# Patient Record
Sex: FEMALE | Race: WHITE | Employment: OTHER | RURAL
[De-identification: names, ages, dates, MRNs, and addresses within clinical notes are randomized per-mention and may not be internally consistent; named-entity substitution may affect disease eponyms.]

---

## 2017-01-17 ENCOUNTER — OFFICE VISIT (OUTPATIENT)
Dept: FAMILY MEDICINE CLINIC | Age: 82
End: 2017-01-17

## 2017-01-17 VITALS
HEART RATE: 63 BPM | HEIGHT: 62 IN | BODY MASS INDEX: 30.51 KG/M2 | TEMPERATURE: 98.5 F | OXYGEN SATURATION: 95 % | SYSTOLIC BLOOD PRESSURE: 121 MMHG | WEIGHT: 165.8 LBS | RESPIRATION RATE: 20 BRPM | DIASTOLIC BLOOD PRESSURE: 70 MMHG

## 2017-01-17 DIAGNOSIS — Z71.89 ADVANCE CARE PLANNING: ICD-10-CM

## 2017-01-17 DIAGNOSIS — M17.0 OSTEOARTHRITIS OF BOTH KNEES, UNSPECIFIED OSTEOARTHRITIS TYPE: ICD-10-CM

## 2017-01-17 DIAGNOSIS — I73.9 PAD (PERIPHERAL ARTERY DISEASE) (HCC): ICD-10-CM

## 2017-01-17 DIAGNOSIS — Z00.00 MEDICARE ANNUAL WELLNESS VISIT, SUBSEQUENT: Primary | ICD-10-CM

## 2017-01-17 RX ORDER — CILOSTAZOL 100 MG/1
TABLET ORAL
Qty: 180 TAB | Refills: 3 | Status: SHIPPED | OUTPATIENT
Start: 2017-01-17 | End: 2019-01-01 | Stop reason: ALTCHOICE

## 2017-01-17 RX ORDER — HYDROCODONE BITARTRATE AND ACETAMINOPHEN 5; 325 MG/1; MG/1
1 TABLET ORAL
Qty: 90 TAB | Refills: 0 | Status: SHIPPED | OUTPATIENT
Start: 2017-01-17 | End: 2017-02-13 | Stop reason: SDUPTHER

## 2017-01-17 NOTE — MR AVS SNAPSHOT
Visit Information Date & Time Provider Department Dept. Phone Encounter #  
 1/17/2017  8:40 AM Sharyle Newcomer, MD [address] [place] [address] [phone] [id number] Follow-up Instructions Return in about 3 months (around 4/17/2017). Follow-up and Disposition History Upcoming Health Maintenance Date Due DTaP/Tdap/Td series (1 - Tdap) 11/24/2015 MEDICARE YEARLY EXAM 12/10/2016 GLAUCOMA SCREENING Q2Y 1/17/2019 Allergies as of 1/17/2017  Review Complete On: 1/17/2017 By: Carissa Pittman LPN Severity Noted Reaction Type Reactions Amoxicillin  08/20/2013    Unknown (comments) Biaxin [Clarithromycin]  01/15/2014    Unknown (comments) Celebrex [Celecoxib]  09/24/2014    Other (comments) Blood in urine Indocin [Indomethacin]  01/15/2014    Unknown (comments) Zithromax [Azithromycin]  01/15/2014    Unknown (comments) Current Immunizations  Reviewed on 10/11/2016 Name Date Influenza High Dose Vaccine PF 10/11/2016, 10/8/2015 Influenza Vaccine 9/24/2014, 10/23/2013, 9/19/2012 Pneumococcal Conjugate (PCV-13) 10/11/2016 Pneumococcal Vaccine (Unspecified Type) 1/23/2009 Td 11/23/2015, 12/14/2008 Not reviewed this visit You Were Diagnosed With   
  
 Codes Comments Medicare annual wellness visit, subsequent    -  Primary ICD-10-CM: Z00.00 ICD-9-CM: V70.0 Advance care planning     ICD-10-CM: Z71.89 ICD-9-CM: V65.49 Osteoarthritis of both knees, unspecified osteoarthritis type     ICD-10-CM: M17.0 ICD-9-CM: 715.96   
 PAD (peripheral artery disease) ([facility] [address]     ICD-10-CM: I73.9 ICD-9-CM: 443. 9 Vitals BP Pulse Temp Resp Height(growth percentile) 121/70 (BP 1 Location: Right arm, BP Patient Position: Sitting) 63 98.5 °F (36.9 °C) (Temporal) 20 5' 2\" (1.575 m) Weight(growth percentile) SpO2 BMI OB Status Smoking Status 165 lb 12.8 oz (75.2 kg) 95% 30.33 kg/m2 Menopause Never Smoker Vitals History BMI and BSA Data Body Mass Index Body Surface Area  
 30.33 kg/m 2 1.81 m 2 Preferred Pharmacy Pharmacy Name Phone [facility] [address] [facility], [address] [place] [address] Your Updated Medication List  
  
   
This list is accurate as of: 1/17/17  9:34 AM.  Always use your most recent med list.  
  
  
  
  
 albuterol 90 mcg/actuation inhaler Commonly known as:  VENTOLIN HFA SHAKE WELL. INHALE 2 PUFFS (1 MINUTE APART) EVERY 4 HOURS AS NEEDED FOR COUGH OR WHEEZE  
  
 ascorbic acid (vitamin C) 500 mg tablet Commonly known as:  VITAMIN C Take  by mouth. ASPERCREME HEAT 10 % Gel Generic drug:  Menthol  
by Apply Externally route. Calcium Carbonate-Vit D3-Min 600 mg calcium- 400 unit Tab Take  by mouth two (2) times a day. cilostazol 100 mg tablet Commonly known as:  PLETAL One bid  
  
 citalopram 40 mg tablet Commonly known as:  CELEXA  
TAKE 1 TABLET BY MOUTH AT BEDTIME FOR DEPRESSION  
  
 gabapentin 100 mg capsule Commonly known as:  NEURONTIN  
TAKE ONE CAPSULE BY MOUTH AT BEDTIME FOR NEUROPATHIC PAIN  
  
 gemfibrozil 600 mg tablet Commonly known as:  LOPID TAKE 1 TABLET BY MOUTH TWICE DAILY FOR CHOLESTROL  
  
 hydroCHLOROthiazide 25 mg tablet Commonly known as:  HYDRODIURIL  
TAKE 1 TABLET BY MOUTH EVERY DAY  
  
 HYDROcodone-acetaminophen 5-325 mg per tablet Commonly known as:  Iven Reddy Take 1 Tab by mouth every eight (8) hours as needed. Max Daily Amount: 3 Tabs. KRILL -51-28-50 mg Cap Generic drug:  krill-omega-3-dha-epa-lipids Take  by mouth. LACTAID PO Take  by mouth.  
  
 lovastatin 20 mg tablet Commonly known as:  MEVACOR  
TAKE 1 TABLET BY MOUTH AT BEDTIME  
  
 MUCUS RELIEF 400 mg tablet Generic drug:  guaiFENesin Take  by mouth two (2) times a day. omeprazole 20 mg capsule Commonly known as:  PRILOSEC  
TAKE ONE CAPSULE BY MOUTH DAILY FOR STOMACH  
  
 simethicone 125 mg capsule Commonly known as:  GAS-X Take 125 mg by mouth four (4) times daily as needed for Flatulence. valsartan 320 mg tablet Commonly known as:  DIOVAN  
TAKE 1 TABLET BY MOUTH EVERY DAY FOR BLOOD PRESSURE Prescriptions Printed Refills HYDROcodone-acetaminophen (NORCO) 5-325 mg per tablet 0 Sig: Take 1 Tab by mouth every eight (8) hours as needed. Max Daily Amount: 3 Tabs. Class: Print Route: Oral  
  
Prescriptions Sent to Pharmacy Refills  
 cilostazol (PLETAL) 100 mg tablet 3 Sig: One bid Class: Normal  
 Pharmacy: [facility] [address] [place] [address] [address]  #: [phone] Follow-up Instructions Return in about 3 months (around 4/17/2017). Patient Instructions Schedule of Personalized Health Plan (Provide Copy to Patient) The best way to stay healthy is to live a healthy lifestyle. A healthy lifestyle includes regular exercise, eating a well-balanced diet, keeping a healthy weight and not smoking. Regular physical exams and screening tests are another important way to take care of yourself. Preventive exams provided by health care providers can find health problems early when treatment works best and can keep you from getting certain diseases or illnesses. Preventive services include exams, lab tests, screenings, shots, monitoring and information to help you take care of your own health. All people over 65 should have a pneumonia shot. Pneumonia shots are usually only needed once in a lifetime unless your doctor decides differently. All people over 65 should have a yearly flu shot. People over 65 are at medium to high risk for Hepatitis B. Three shots are needed for complete protection. In addition to your physical exam, some screening tests are recommended: 
 
Bone mass measurement (dexa scan) is recommended every two years Diabetes Mellitus screening is recommended every year. Glaucoma is an eye disease caused by high pressure in the eye. An eye exam is recommended every year. Cardiovascular screening tests that check your cholesterol and other blood fat (lipid) levels are recommended every five years. Colorectal Cancer screening tests help to find pre-cancerous polyps (growths in the colon) so they can be removed before they turn into cancer. Tests ordered for screening depend on your personal and family history risk factors. Screening for Breast Cancer is recommended yearly with a mammogram. 
 
Screening for Cervical Cancer is recommended every two years (annually for certain risk factors, such as previous history of STD or abnormal PAP in past 7 years), with a Pelvic Exam with PAP Here is a list of your current Health Maintenance items with a due date: 
Health Maintenance Topic Date Due  
 ZOSTER VACCINE AGE 60>  02/28/1985  GLAUCOMA SCREENING Q2Y  02/28/1990  
 DTaP/Tdap/Td series (1 - Tdap) 11/24/2015  MEDICARE YEARLY EXAM  12/10/2016  
 OSTEOPOROSIS SCREENING (DEXA)  Completed  Pneumococcal 65+ Low/Medium Risk  Completed  INFLUENZA AGE 9 TO ADULT  Completed Introducing [facility]! Svitlana Villanueva introduces Zomato patient portal. Now you can access parts of your medical record, email your doctor's office, and request medication refills online. 1. In your internet browser, go to [email or web address] 2. Click on the First Time User? Click Here link in the Sign In box. You will see the New Member Sign Up page. 3. Enter your Zomato Access Code exactly as it appears below. You will not need to use this code after youve completed the sign-up process. If you do not sign up before the expiration date, you must request a new code. · Zomato Access Code: [email or web address] Expires: 4/17/2017  8:56 AM 
 
4.  Enter the last four digits of your Social Security Number (xxxx) and Date of Birth (mm/dd/yyyy) as indicated and click Submit. You will be taken to the next sign-up page. 5. Create a Blogic ID. This will be your Blogic login ID and cannot be changed, so think of one that is secure and easy to remember. 6. Create a Blogic password. You can change your password at any time. 7. Enter your Password Reset Question and Answer. This can be used at a later time if you forget your password. 8. Enter your e-mail address. You will receive e-mail notification when new information is available in [address] 9. Click Sign Up. You can now view and download portions of your medical record. 10. Click the Download Summary menu link to download a portable copy of your medical information. If you have questions, please visit the Frequently Asked Questions section of the Blogic website. Remember, Blogic is NOT to be used for urgent needs. For medical emergencies, dial 911. Now available from your iPhone and Android! Please provide this summary of care documentation to your next provider. Your primary care clinician is listed as Janie Stringer. If you have any questions after today's visit, please call [phone].

## 2017-01-17 NOTE — PATIENT INSTRUCTIONS
Schedule of Personalized Health Plan  (Provide Copy to Patient)  The best way to stay healthy is to live a healthy lifestyle. A healthy lifestyle includes regular exercise, eating a well-balanced diet, keeping a healthy weight and not smoking. Regular physical exams and screening tests are another important way to take care of yourself. Preventive exams provided by health care providers can find health problems early when treatment works best and can keep you from getting certain diseases or illnesses. Preventive services include exams, lab tests, screenings, shots, monitoring and information to help you take care of your own health. All people over 65 should have a pneumonia shot. Pneumonia shots are usually only needed once in a lifetime unless your doctor decides differently. All people over 65 should have a yearly flu shot. People over 65 are at medium to high risk for Hepatitis B. Three shots are needed for complete protection. In addition to your physical exam, some screening tests are recommended:    Bone mass measurement (dexa scan) is recommended every two years  Diabetes Mellitus screening is recommended every year. Glaucoma is an eye disease caused by high pressure in the eye. An eye exam is recommended every year. Cardiovascular screening tests that check your cholesterol and other blood fat (lipid) levels are recommended every five years. Colorectal Cancer screening tests help to find pre-cancerous polyps (growths in the colon) so they can be removed before they turn into cancer. Tests ordered for screening depend on your personal and family history risk factors.     Screening for Breast Cancer is recommended yearly with a mammogram.    Screening for Cervical Cancer is recommended every two years (annually for certain risk factors, such as previous history of STD or abnormal PAP in past 7 years), with a Pelvic Exam with PAP    Here is a list of your current Health Maintenance items with a due date:  Health Maintenance   Topic Date Due    ZOSTER VACCINE AGE 60>  02/28/1985    GLAUCOMA SCREENING Q2Y  02/28/1990    DTaP/Tdap/Td series (1 - Tdap) 11/24/2015    MEDICARE YEARLY EXAM  12/10/2016    OSTEOPOROSIS SCREENING (DEXA)  Completed    Pneumococcal 65+ Low/Medium Risk  Completed    INFLUENZA AGE 9 TO ADULT  Completed

## 2017-01-17 NOTE — PROGRESS NOTES
Chief Complaint   Patient presents with    Medication Evaluation     patient is here to see the doctor for pain evaluation and medication    Medication Refill     Cilostazol     Morning meds taken this Patrick Boston LPN  Patient stated she had her Medicare wellness last year with Clarita Rodney. Rama Arana LPN      Desmond Husain is a 80 y.o. female and presents for annual Medicare Wellness Visit. Problem List: Reviewed with patient and discussed risk factors. Patient Active Problem List   Diagnosis Code    Anxiety F41.9    Elevated lipids E78.5    Asthma J45.909    Hypertension I10    Chronic pain G89.29    PAD [facility]peripheral artery disease) ([facility]) I73.9    Arthritis, degenerative M19.90    Neuropathy G62.9    Gastroesophageal reflux disease K21.9       Current medical providers:  Patient Care Team:  Zena Byrnes MD as PCP - General (Family Practice)  Sarina Fermin MD (Orthopedic Surgery)  Antonia Arreguin MD (General Surgery)    PSH: Reviewed with patient  Past Surgical History   Procedure Laterality Date    Hx cataract removal      Pr breast surgery procedure unlisted      Hx colonoscopy  06/07/2006        SH: Reviewed with patient  Social History   Substance Use Topics    Smoking status: Never Smoker    Smokeless tobacco: Never Used    Alcohol use No       FH: Reviewed with patient  Family History   Problem Relation Age of Onset   [address] [facility] Cancer Brother      kidney CA       Medications/Allergies: Reviewed with patient  Current Outpatient Prescriptions on File Prior to Visit   Medication Sig Dispense Refill    HYDROcodone-acetaminophen (NORCO) 5-325 mg per tablet Take 1 Tab by mouth every eight (8) hours as needed. Max Daily Amount: 3 Tabs.  90 Tab 0    hydroCHLOROthiazide (HYDRODIURIL) 25 mg tablet TAKE 1 TABLET BY MOUTH EVERY DAY 90 Tab 3    gabapentin (NEURONTIN) 100 mg capsule TAKE ONE CAPSULE BY MOUTH AT BEDTIME FOR NEUROPATHIC PAIN 30 Cap 5    valsartan (DIOVAN) 320 mg tablet TAKE 1 TABLET BY MOUTH EVERY DAY FOR BLOOD PRESSURE 30 Tab 5    gemfibrozil (LOPID) 600 mg tablet TAKE 1 TABLET BY MOUTH TWICE DAILY FOR CHOLESTROL 60 Tab 5    lovastatin (MEVACOR) 20 mg tablet TAKE 1 TABLET BY MOUTH AT BEDTIME 30 Tab 5    omeprazole (PRILOSEC) 20 mg capsule TAKE ONE CAPSULE BY MOUTH DAILY FOR STOMACH 30 Cap 5    citalopram (CELEXA) 40 mg tablet TAKE 1 TABLET BY MOUTH AT BEDTIME FOR DEPRESSION 30 Tab 5    Calcium Carbonate-Vit D3-Min 600 mg calcium- 400 unit tab Take  by mouth two (2) times a day.  guaiFENesin (MUCUS RELIEF) 400 mg tablet Take  by mouth two (2) times a day.  albuterol (VENTOLIN HFA) 90 mcg/actuation inhaler SHAKE WELL. INHALE 2 PUFFS (1 MINUTE APART) EVERY 4 HOURS AS NEEDED FOR COUGH OR WHEEZE 1 Inhaler 5    cilostazol (PLETAL) 100 mg tablet Take  by mouth Before breakfast and dinner.  ascorbic acid (VITAMIN C) 500 mg tablet Take  by mouth.  simethicone (GAS-X) 125 mg capsule Take 125 mg by mouth four (4) times daily as needed for Flatulence.  krill-omega-3-dha-epa-lipids (KRILL OIL) 140-44-80-52 mg cap Take  by mouth.  LACTASE (LACTAID PO) Take  by mouth.  Menthol (ASPERCREME HEAT) 10 % gel by Apply Externally route. No current facility-administered medications on file prior to visit. Allergies   Allergen Reactions    Amoxicillin Unknown (comments)    Biaxin [Clarithromycin] Unknown (comments)    Celebrex [Celecoxib] Other (comments)     Blood in urine    Indocin [Indomethacin] Unknown (comments)    Zithromax [Azithromycin] Unknown (comments)       Objective:  Visit Vitals    /70 (BP 1 Location: Right arm, BP Patient Position: Sitting)    Pulse 63    Temp 98.5 °F (36.9 °C) (Temporal)    Resp 20    Ht 5' 2\" (1.575 m)    Wt 165 lb 12.8 oz (75.2 kg)    SpO2 95%    BMI 30.33 kg/m2    Body mass index is 30.33 kg/(m^2).     Assessment of cognitive impairment: Alert and oriented x 3    Depression Screen:   PHQ 2 / 9, over the last two weeks 1/17/2017   Little interest or pleasure in doing things -   Feeling down, depressed or hopeless Not at all   Total Score PHQ 2 -       Fall Risk Assessment:    Fall Risk Assessment, last 12 mths 1/17/2017   Able to walk? Yes   Fall in past 12 months? No   Fall with injury? -   Number of falls in past 12 months -   Fall Risk Score -       Functional Ability:   Does the patient exhibit a steady gait?  no   How long did it take the patient to get up and walk from a sitting position? 2 seconds   Is the patient self reliant?  (ie can do own laundry, meals, household chores)  yes     Does the patient handle his/her own medications? yes     Does the patient handle his/her own money? yes     Is the patients home safe (ie good lighting, handrails on stairs and bath, etc.)? yes     Did you notice or did patient express any hearing difficulties? yes     Did you notice or did patient express any vision difficulties?   no     Were distance and reading eye charts used? no       Advance Care Planning:   Patient was offered the opportunity to discuss advance care planning:  yes     Does patient have an Advance Directive:  yes   If no, did you provide information on Caring Connections? yes       Plan:      No orders of the defined types were placed in this encounter. Health Maintenance   Topic Date Due    ZOSTER VACCINE AGE 60>  02/28/1985    GLAUCOMA SCREENING Q2Y  02/28/1990    DTaP/Tdap/Td series (1 - Tdap) 11/24/2015    MEDICARE YEARLY EXAM  12/10/2016    OSTEOPOROSIS SCREENING (DEXA)  Completed    Pneumococcal 65+ Low/Medium Risk  Completed    INFLUENZA AGE 9 TO ADULT  Completed       *Patient verbalized understanding and agreement with the plan. A copy of the After Visit Summary with personalized health plan was given to the patient today.

## 2017-01-17 NOTE — PROGRESS NOTES
HISTORY OF PRESENT ILLNESS  Otoniel Isaac is a 80 y.o. female. Chief Complaint   Patient presents with    Medication Evaluation     patient is here to see the doctor for pain evaluation and medication    Medication Refill     Cilostazol    Annual Wellness Visit            HPI  Needs Pletal refilled  Ran out a mo ago  Helped with circulation in legs and feet    Has hip and knee pains d/t OA  Needs Rx for pain med  Doing well on it  Still has some pain    HM reviewed    Review of Systems   Constitutional: Negative for fever. HENT: Positive for congestion. Respiratory: Negative for cough. Cardiovascular: Negative for chest pain. Gastrointestinal: Negative for blood in stool. Genitourinary: Negative for hematuria. Musculoskeletal: Positive for joint pain. Past Medical History   Diagnosis Date    Anxiety     Asthma     Cancer ([facility] [address]      breast    Chronic pain     Elevated lipids     GERD (gastroesophageal reflux disease)     Heart murmur     Hypercholesterolemia     Hypertension     Leg cramps     Osteoporosis     PAD (peripheral artery disease) ([facility])      Current Outpatient Prescriptions   Medication Sig Dispense Refill    HYDROcodone-acetaminophen (NORCO) 5-325 mg per tablet Take 1 Tab by mouth every eight (8) hours as needed. Max Daily Amount: 3 Tabs.  90 Tab 0    cilostazol (PLETAL) 100 mg tablet One bid 180 Tab 3    hydroCHLOROthiazide (HYDRODIURIL) 25 mg tablet TAKE 1 TABLET BY MOUTH EVERY DAY 90 Tab 3    gabapentin (NEURONTIN) 100 mg capsule TAKE ONE CAPSULE BY MOUTH AT BEDTIME FOR NEUROPATHIC PAIN 30 Cap 5    valsartan (DIOVAN) 320 mg tablet TAKE 1 TABLET BY MOUTH EVERY DAY FOR BLOOD PRESSURE 30 Tab 5    gemfibrozil (LOPID) 600 mg tablet TAKE 1 TABLET BY MOUTH TWICE DAILY FOR CHOLESTROL 60 Tab 5    lovastatin (MEVACOR) 20 mg tablet TAKE 1 TABLET BY MOUTH AT BEDTIME 30 Tab 5    omeprazole (PRILOSEC) 20 mg capsule TAKE ONE CAPSULE BY MOUTH DAILY FOR STOMACH 30 Cap 5  citalopram (CELEXA) 40 mg tablet TAKE 1 TABLET BY MOUTH AT BEDTIME FOR DEPRESSION 30 Tab 5    Calcium Carbonate-Vit D3-Min 600 mg calcium- 400 unit tab Take  by mouth two (2) times a day.  guaiFENesin (MUCUS RELIEF) 400 mg tablet Take  by mouth two (2) times a day.  albuterol (VENTOLIN HFA) 90 mcg/actuation inhaler SHAKE WELL. INHALE 2 PUFFS (1 MINUTE APART) EVERY 4 HOURS AS NEEDED FOR COUGH OR WHEEZE 1 Inhaler 5    ascorbic acid (VITAMIN C) 500 mg tablet Take  by mouth.  simethicone (GAS-X) 125 mg capsule Take 125 mg by mouth four (4) times daily as needed for Flatulence.  krill-omega-3-dha-epa-lipids (KRILL OIL) 585-94-93-41 mg cap Take  by mouth.  LACTASE (LACTAID PO) Take  by mouth.  Menthol (ASPERCREME HEAT) 10 % gel by Apply Externally route. Allergies   Allergen Reactions    Amoxicillin Unknown (comments)    Biaxin [Clarithromycin] Unknown (comments)    Celebrex [Celecoxib] Other (comments)     Blood in urine    Indocin [Indomethacin] Unknown (comments)    Zithromax [Azithromycin] Unknown (comments)     Visit Vitals    /70 (BP 1 Location: Right arm, BP Patient Position: Sitting)    Pulse 63    Temp 98.5 °F (36.9 °C) (Temporal)    Resp 20    Ht 5' 2\" (1.575 m)    Wt 165 lb 12.8 oz (75.2 kg)    SpO2 95%    BMI 30.33 kg/m2       Physical Exam   Constitutional: She is oriented to person, place, and time. She appears well-developed and well-nourished. No distress. HENT:   Head: Normocephalic and atraumatic. Right Ear: External ear normal.   Left Ear: External ear normal.   Nose: Nose normal.   Mouth/Throat: Oropharynx is clear and moist.   Eyes: Conjunctivae and EOM are normal. Pupils are equal, round, and reactive to light. Cardiovascular: Normal rate and regular rhythm. Murmur heard. Feet cool and mildly red   Pulmonary/Chest: Effort normal and breath sounds normal.   Musculoskeletal: She exhibits no edema.    Lymphadenopathy:     She has no cervical adenopathy. Neurological: She is alert and oriented to person, place, and time. Skin: Skin is warm and dry. Psychiatric: She has a normal mood and affect. ASSESSMENT and PLAN    ICD-10-CM ICD-9-CM    1. Medicare annual wellness visit, subsequent Z00.00 V70.0    2. Advance care planning Z71.89 V65.49    3. Osteoarthritis of both knees, unspecified osteoarthritis type M17.0 715.96 HYDROcodone-acetaminophen (NORCO) 5-325 mg per tablet   4.  PAD (peripheral artery disease) ([facility]) I73.9 443.9 cilostazol (PLETAL) 100 mg tablet

## 2017-02-13 DIAGNOSIS — M17.0 OSTEOARTHRITIS OF BOTH KNEES, UNSPECIFIED OSTEOARTHRITIS TYPE: ICD-10-CM

## 2017-02-13 RX ORDER — HYDROCODONE BITARTRATE AND ACETAMINOPHEN 5; 325 MG/1; MG/1
1 TABLET ORAL
Qty: 90 TAB | Refills: 0 | OUTPATIENT
Start: 2017-02-13

## 2017-02-14 ENCOUNTER — TELEPHONE (OUTPATIENT)
Dept: FAMILY MEDICINE CLINIC | Age: 82
End: 2017-02-14

## 2017-02-14 RX ORDER — HYDROCODONE BITARTRATE AND ACETAMINOPHEN 5; 325 MG/1; MG/1
1 TABLET ORAL
Qty: 90 TAB | Refills: 0 | Status: SHIPPED | OUTPATIENT
Start: 2017-02-14 | End: 2017-03-13 | Stop reason: SDUPTHER

## 2017-03-13 DIAGNOSIS — M17.0 OSTEOARTHRITIS OF BOTH KNEES, UNSPECIFIED OSTEOARTHRITIS TYPE: ICD-10-CM

## 2017-03-13 NOTE — TELEPHONE ENCOUNTER
Requested Prescriptions     Pending Prescriptions Disp Refills    HYDROcodone-acetaminophen (NORCO) 5-325 mg per tablet 90 Tab 0     Sig: Take 1 Tab by mouth every eight (8) hours as needed. Max Daily Amount: 3 Tabs.        Last filled:           2/14/17       #90         0 refill

## 2017-03-13 NOTE — TELEPHONE ENCOUNTER
Pt calls to   Requested Prescriptions     Pending Prescriptions Disp Refills    HYDROcodone-acetaminophen (NORCO) 5-325 mg per tablet 90 Tab 0     Sig: Take 1 Tab by mouth every eight (8) hours as needed. Max Daily Amount: 3 Tabs.

## 2017-03-14 RX ORDER — HYDROCODONE BITARTRATE AND ACETAMINOPHEN 5; 325 MG/1; MG/1
1 TABLET ORAL
Qty: 90 TAB | Refills: 0 | Status: SHIPPED | OUTPATIENT
Start: 2017-03-14 | End: 2017-04-14 | Stop reason: SDUPTHER

## 2017-04-14 ENCOUNTER — OFFICE VISIT (OUTPATIENT)
Dept: FAMILY MEDICINE CLINIC | Age: 82
End: 2017-04-14

## 2017-04-14 VITALS
HEART RATE: 72 BPM | BODY MASS INDEX: 30.59 KG/M2 | DIASTOLIC BLOOD PRESSURE: 58 MMHG | WEIGHT: 166.2 LBS | TEMPERATURE: 98.4 F | OXYGEN SATURATION: 97 % | RESPIRATION RATE: 18 BRPM | HEIGHT: 62 IN | SYSTOLIC BLOOD PRESSURE: 118 MMHG

## 2017-04-14 DIAGNOSIS — E78.5 ELEVATED LIPIDS: ICD-10-CM

## 2017-04-14 DIAGNOSIS — M17.0 OSTEOARTHRITIS OF BOTH KNEES, UNSPECIFIED OSTEOARTHRITIS TYPE: Primary | ICD-10-CM

## 2017-04-14 DIAGNOSIS — I10 ESSENTIAL HYPERTENSION: ICD-10-CM

## 2017-04-14 DIAGNOSIS — J40 BRONCHITIS: ICD-10-CM

## 2017-04-14 DIAGNOSIS — D64.9 ANEMIA, UNSPECIFIED TYPE: ICD-10-CM

## 2017-04-14 DIAGNOSIS — J45.901 ASTHMA EXACERBATION: ICD-10-CM

## 2017-04-14 RX ORDER — DOXYCYCLINE 100 MG/1
100 TABLET ORAL 2 TIMES DAILY
Qty: 20 TAB | Refills: 0 | Status: SHIPPED | OUTPATIENT
Start: 2017-04-14 | End: 2017-04-19 | Stop reason: ALTCHOICE

## 2017-04-14 RX ORDER — NALOXONE HYDROCHLORIDE 4 MG/.1ML
1 SPRAY NASAL AS NEEDED
Qty: 1 CARTRIDGE | Refills: 1 | Status: SHIPPED | OUTPATIENT
Start: 2017-04-14 | End: 2017-06-08

## 2017-04-14 RX ORDER — HYDROCODONE BITARTRATE AND ACETAMINOPHEN 5; 325 MG/1; MG/1
1 TABLET ORAL
Qty: 90 TAB | Refills: 0 | Status: SHIPPED | OUTPATIENT
Start: 2017-04-14 | End: 2017-06-02 | Stop reason: SDUPTHER

## 2017-04-14 NOTE — PROGRESS NOTES
HISTORY OF PRESENT ILLNESS  Karoline Romberg is a 80 y.o. female. Chief Complaint   Patient presents with    Follow-up     osteoarthritis       HPI   Cough for 2 w  Asthma and Sinuses   Mucus in throat  Has inhaler but not helping much  Needs solution for neb  SOB with exercise    Needs Hydrocodone for OA    Fasting for BW today  HTN    Hyperlipidemia  Good with refills    HTN    Review of Systems   Constitutional: Negative for fever. HENT: Positive for congestion (clear). Respiratory: Positive for cough, sputum production and shortness of breath. Cardiovascular: Negative for leg swelling. Musculoskeletal: Positive for back pain and joint pain. Past Medical History:   Diagnosis Date    Anxiety     Asthma     Cancer ([facility]     breast    Chronic pain     Elevated lipids     GERD (gastroesophageal reflux disease)     Heart murmur     Hypercholesterolemia     Hypertension     Leg cramps     Osteoporosis     PAD (peripheral artery disease) ([facility])      Current Outpatient Prescriptions   Medication Sig Dispense Refill    doxycycline (ADOXA) 100 mg tablet Take 1 Tab by mouth two (2) times a day. 20 Tab 0    naloxone 4 mg/actuation spry 1 Spray by Nasal route as needed. 1 Cartridge 1    HYDROcodone-acetaminophen (NORCO) 5-325 mg per tablet Take 1 Tab by mouth every eight (8) hours as needed. Max Daily Amount: 3 Tabs.  90 Tab 0    cilostazol (PLETAL) 100 mg tablet One bid 180 Tab 3    hydroCHLOROthiazide (HYDRODIURIL) 25 mg tablet TAKE 1 TABLET BY MOUTH EVERY DAY 90 Tab 3    gabapentin (NEURONTIN) 100 mg capsule TAKE ONE CAPSULE BY MOUTH AT BEDTIME FOR NEUROPATHIC PAIN 30 Cap 5    valsartan (DIOVAN) 320 mg tablet TAKE 1 TABLET BY MOUTH EVERY DAY FOR BLOOD PRESSURE 30 Tab 5    gemfibrozil (LOPID) 600 mg tablet TAKE 1 TABLET BY MOUTH TWICE DAILY FOR CHOLESTROL 60 Tab 5    lovastatin (MEVACOR) 20 mg tablet TAKE 1 TABLET BY MOUTH AT BEDTIME 30 Tab 5    omeprazole (PRILOSEC) 20 mg capsule TAKE ONE CAPSULE BY MOUTH DAILY FOR STOMACH 30 Cap 5    citalopram (CELEXA) 40 mg tablet TAKE 1 TABLET BY MOUTH AT BEDTIME FOR DEPRESSION 30 Tab 5    Calcium Carbonate-Vit D3-Min 600 mg calcium- 400 unit tab Take  by mouth two (2) times a day.  guaiFENesin (MUCUS RELIEF) 400 mg tablet Take  by mouth two (2) times a day.  albuterol (VENTOLIN HFA) 90 mcg/actuation inhaler SHAKE WELL. INHALE 2 PUFFS (1 MINUTE APART) EVERY 4 HOURS AS NEEDED FOR COUGH OR WHEEZE 1 Inhaler 5    ascorbic acid (VITAMIN C) 500 mg tablet Take  by mouth.  simethicone (GAS-X) 125 mg capsule Take 125 mg by mouth four (4) times daily as needed for Flatulence.  krill-omega-3-dha-epa-lipids (KRILL OIL) 078-17-20-90 mg cap Take  by mouth.  LACTASE (LACTAID PO) Take  by mouth.  Menthol (ASPERCREME HEAT) 10 % gel by Apply Externally route. Allergies   Allergen Reactions    Amoxicillin Unknown (comments)    Biaxin [Clarithromycin] Unknown (comments)    Celebrex [Celecoxib] Other (comments)     Blood in urine    Indocin [Indomethacin] Unknown (comments)    Zithromax [Azithromycin] Unknown (comments)     Patient said she is allergic all Mycins     Visit Vitals    /58 (BP 1 Location: Right arm, BP Patient Position: Sitting)    Pulse 72    Temp 98.4 °F (36.9 °C) (Oral)    Resp 18    Ht 5' 2\" (1.575 m)    Wt 166 lb 3.2 oz (75.4 kg)    SpO2 97%    BMI 30.4 kg/m2       Physical Exam   Constitutional: She is oriented to person, place, and time. She appears well-developed and well-nourished. No distress. HENT:   Head: Normocephalic and atraumatic. Right Ear: External ear normal.   Left Ear: External ear normal.   Mouth/Throat: Oropharynx is clear and moist. No oropharyngeal exudate. Nose with yellow and green mucus   Eyes: Conjunctivae and EOM are normal. Pupils are equal, round, and reactive to light. Cardiovascular: Normal rate and regular rhythm. Murmur heard.   Pulmonary/Chest: Effort normal and breath sounds normal. She has no wheezes. Frequent deep cough   Musculoskeletal: She exhibits no edema. Lymphadenopathy:     She has no cervical adenopathy. Neurological: She is alert and oriented to person, place, and time. Skin: Skin is warm and dry. Psychiatric: She has a normal mood and affect. Nursing note and vitals reviewed. ASSESSMENT and PLAN    ICD-10-CM ICD-9-CM    1. Osteoarthritis of both knees, unspecified osteoarthritis type M17.0 715.96 HYDROCODONE + METABOLITES, URINE      naloxone 4 mg/actuation spry      HYDROcodone-acetaminophen (NORCO) 5-325 mg per tablet   2. Elevated lipids E78.5 272.4 LIPID PANEL WITH LDL/HDL RATIO      AK HANDLG&/OR CONVEY OF SPEC FOR TR OFFICE TO LAB      AK COLLECTION VENOUS BLOOD,VENIPUNCTURE   3. Essential hypertension T45 840.6 METABOLIC PANEL, COMPREHENSIVE   4. Bronchitis J40 490 CBC WITH AUTOMATED DIFF   5.  Asthma exacerbation J45.901 493.92 INHAL RX, AIRWAY OBST/DX SPUTUM INDUCT   Albuterol neb sol ordered

## 2017-04-14 NOTE — MR AVS SNAPSHOT
Visit Information Date & Time Provider Department Dept. Phone Encounter #  
 4/14/2017  8:00 AM Omar Kumar MD [address] [place] [address] [phone] [id number] Follow-up Instructions Return if symptoms worsen or fail to improve. Follow-up and Disposition History Upcoming Health Maintenance Date Due DTaP/Tdap/Td series (1 - Tdap) 11/24/2015 MEDICARE YEARLY EXAM 1/18/2018 GLAUCOMA SCREENING Q2Y 1/17/2019 Allergies as of 4/14/2017  Review Complete On: 4/14/2017 By: Zhang Tyler LPN Severity Noted Reaction Type Reactions Amoxicillin  08/20/2013    Unknown (comments) Biaxin [Clarithromycin]  01/15/2014    Unknown (comments) Celebrex [Celecoxib]  09/24/2014    Other (comments) Blood in urine Indocin [Indomethacin]  01/15/2014    Unknown (comments) Zithromax [Azithromycin]  01/15/2014    Unknown (comments) Patient said she is allergic all Mycins Current Immunizations  Reviewed on 10/11/2016 Name Date Influenza High Dose Vaccine PF 10/11/2016, 10/8/2015 Influenza Vaccine 9/24/2014, 10/23/2013, 9/19/2012 Pneumococcal Conjugate (PCV-13) 10/11/2016 Pneumococcal Vaccine (Unspecified Type) 1/23/2009 Td 11/23/2015, 12/14/2008 Not reviewed this visit You Were Diagnosed With   
  
 Codes Comments Osteoarthritis of both knees, unspecified osteoarthritis type    -  Primary ICD-10-CM: M17.0 ICD-9-CM: 715.96 Elevated lipids     ICD-10-CM: E78.5 ICD-9-CM: 272.4 Essential hypertension     ICD-10-CM: I10 
ICD-9-CM: 401.9 Bronchitis     ICD-10-CM: J40 ICD-9-CM: 616 Asthma exacerbation     ICD-10-CM: K36.645 ICD-9-CM: 302.77 Vitals BP Pulse Temp Resp Height(growth percentile) Weight(growth percentile) 118/58 (BP 1 Location: Right arm, BP Patient Position: Sitting) 72 98.4 °F (36.9 °C) (Oral) 18 5' 2\" (1.575 m) 166 lb 3.2 oz (75.4 kg) SpO2 BMI OB Status Smoking Status 97% 30.4 kg/m2 Menopause Never Smoker Vitals History BMI and BSA Data Body Mass Index Body Surface Area  
 30.4 kg/m 2 1.82 m 2 Preferred Pharmacy Pharmacy Name Phone [facility], [address] [place] [address] Your Updated Medication List  
  
   
This list is accurate as of: 4/14/17  8:49 AM.  Always use your most recent med list.  
  
  
  
  
 albuterol 90 mcg/actuation inhaler Commonly known as:  VENTOLIN HFA SHAKE WELL. INHALE 2 PUFFS (1 MINUTE APART) EVERY 4 HOURS AS NEEDED FOR COUGH OR WHEEZE  
  
 ascorbic acid (vitamin C) 500 mg tablet Commonly known as:  VITAMIN C Take  by mouth. ASPERCREME HEAT 10 % Gel Generic drug:  Menthol  
by Apply Externally route. Calcium Carbonate-Vit D3-Min 600 mg calcium- 400 unit Tab Take  by mouth two (2) times a day. cilostazol 100 mg tablet Commonly known as:  PLETAL One bid  
  
 citalopram 40 mg tablet Commonly known as:  CELEXA  
TAKE 1 TABLET BY MOUTH AT BEDTIME FOR DEPRESSION  
  
 doxycycline 100 mg tablet Commonly known as:  ADOXA Take 1 Tab by mouth two (2) times a day.  
  
 gabapentin 100 mg capsule Commonly known as:  NEURONTIN  
TAKE ONE CAPSULE BY MOUTH AT BEDTIME FOR NEUROPATHIC PAIN  
  
 gemfibrozil 600 mg tablet Commonly known as:  LOPID TAKE 1 TABLET BY MOUTH TWICE DAILY FOR CHOLESTROL  
  
 hydroCHLOROthiazide 25 mg tablet Commonly known as:  HYDRODIURIL  
TAKE 1 TABLET BY MOUTH EVERY DAY  
  
 HYDROcodone-acetaminophen 5-325 mg per tablet Commonly known as:  Cely De La Cruz Take 1 Tab by mouth every eight (8) hours as needed. Max Daily Amount: 3 Tabs. KRILL -61-44-50 mg Cap Generic drug:  krill-omega-3-dha-epa-lipids Take  by mouth. LACTAID PO Take  by mouth.  
  
 lovastatin 20 mg tablet Commonly known as:  MEVACOR  
TAKE 1 TABLET BY MOUTH AT BEDTIME  
  
 MUCUS RELIEF 400 mg tablet Generic drug:  guaiFENesin Take  by mouth two (2) times a day.  
  
 naloxone 4 mg/actuation Spry 1 Spray by Nasal route as needed. omeprazole 20 mg capsule Commonly known as:  PRILOSEC  
TAKE ONE CAPSULE BY MOUTH DAILY FOR STOMACH  
  
 simethicone 125 mg capsule Commonly known as:  GAS-X Take 125 mg by mouth four (4) times daily as needed for Flatulence. valsartan 320 mg tablet Commonly known as:  DIOVAN  
TAKE 1 TABLET BY MOUTH EVERY DAY FOR BLOOD PRESSURE Prescriptions Printed Refills HYDROcodone-acetaminophen (NORCO) 5-325 mg per tablet 0 Sig: Take 1 Tab by mouth every eight (8) hours as needed. Max Daily Amount: 3 Tabs. Class: Print Route: Oral  
  
Prescriptions Sent to Pharmacy Refills  
 doxycycline (ADOXA) 100 mg tablet 0 Sig: Take 1 Tab by mouth two (2) times a day. Class: Normal  
 Pharmacy: [facility] [place] [id number] Ph #: [phone] Route: Oral  
 naloxone 4 mg/actuation spry 1 Si Spray by Nasal route as needed. Class: Normal  
 Pharmacy: [facility] [place] [address] Ph #: [phone] Route: Nasal  
  
We Performed the Following CBC WITH AUTOMATED DIFF [72167 CPT(R)] HYDROCODONE + METABOLITES, URINE [93605 CPT(R)] INHAL RX, AIRWAY OBST/DX SPUTUM INDUCT J982717 CPT(R)] LIPID PANEL WITH LDL/HDL RATIO [71808 CPT(R)] METABOLIC PANEL, COMPREHENSIVE [97705 CPT(R)] GA COLLECTION VENOUS BLOOD,VENIPUNCTURE [id number] CPT(R)] GA HANDLG&/OR CONVEY OF SPEC FOR TR OFFICE TO LAB [51397 CPT(R)] Follow-up Instructions Return if symptoms worsen or fail to improve. Introducing [facility]! Jerrica Garnett introduces [employer] patient portal. Now you can access parts of your medical record, email your doctor's office, and request medication refills online. 1. In your internet browser, go to [email or web address] 2. Click on the First Time User? Click Here link in the Sign In box. You will see the New Member Sign Up page. 3. Enter your [employer] Access Code exactly as it appears below. You will not need to use this code after youve completed the sign-up process. If you do not sign up before the expiration date, you must request a new code. · [employer] Access Code: [email or web address] Expires: 4/17/2017  9:56 AM 
 
4. Enter the last four digits of your Social Security Number (xxxx) and Date of Birth (mm/dd/yyyy) as indicated and click Submit. You will be taken to the next sign-up page. 5. Create a [employer] ID. This will be your [employer] login ID and cannot be changed, so think of one that is secure and easy to remember. 6. Create a [employer] password. You can change your password at any time. 7. Enter your Password Reset Question and Answer. This can be used at a later time if you forget your password. 8. Enter your e-mail address. You will receive e-mail notification when new information is available in [address] 9. Click Sign Up. You can now view and download portions of your medical record. 10. Click the Download Summary menu link to download a portable copy of your medical information. If you have questions, please visit the Frequently Asked Questions section of the [employer] website. Remember, [employer] is NOT to be used for urgent needs. For medical emergencies, dial 911. Now available from your iPhone and Android! Please provide this summary of care documentation to your next provider. Your primary care clinician is listed as Janie Stringer. If you have any questions after today's visit, please call [phone].

## 2017-04-15 LAB
ALBUMIN SERPL-MCNC: 4.2 G/DL (ref 3.2–4.6)
ALBUMIN/GLOB SERPL: 2 {RATIO} (ref 1.2–2.2)
ALP SERPL-CCNC: 47 IU/L (ref 39–117)
ALT SERPL-CCNC: 8 IU/L (ref 0–32)
AST SERPL-CCNC: 12 IU/L (ref 0–40)
BASOPHILS # BLD AUTO: 0 X10E3/UL (ref 0–0.2)
BASOPHILS NFR BLD AUTO: 0 %
BILIRUB SERPL-MCNC: 0.5 MG/DL (ref 0–1.2)
BUN SERPL-MCNC: 25 MG/DL (ref 10–36)
BUN/CREAT SERPL: 22 (ref 12–28)
CALCIUM SERPL-MCNC: 9.6 MG/DL (ref 8.7–10.3)
CHLORIDE SERPL-SCNC: 97 MMOL/L (ref 96–106)
CHOLEST SERPL-MCNC: 153 MG/DL (ref 100–199)
CO2 SERPL-SCNC: 24 MMOL/L (ref 18–29)
CREAT SERPL-MCNC: 1.15 MG/DL (ref 0.57–1)
EOSINOPHIL # BLD AUTO: 0.2 X10E3/UL (ref 0–0.4)
EOSINOPHIL NFR BLD AUTO: 2 %
ERYTHROCYTE [DISTWIDTH] IN BLOOD BY AUTOMATED COUNT: 15.7 % (ref 12.3–15.4)
GLOBULIN SER CALC-MCNC: 2.1 G/DL (ref 1.5–4.5)
GLUCOSE SERPL-MCNC: 91 MG/DL (ref 65–99)
HCT VFR BLD AUTO: 29.1 % (ref 34–46.6)
HDLC SERPL-MCNC: 77 MG/DL
HGB BLD-MCNC: 8.3 G/DL (ref 11.1–15.9)
IMM GRANULOCYTES # BLD: 0 X10E3/UL (ref 0–0.1)
IMM GRANULOCYTES NFR BLD: 0 %
INTERPRETATION, 910389: NORMAL
INTERPRETATION: NORMAL
LDLC SERPL CALC-MCNC: 59 MG/DL (ref 0–99)
LDLC/HDLC SERPL: 0.8 RATIO UNITS (ref 0–3.2)
LYMPHOCYTES # BLD AUTO: 0.8 X10E3/UL (ref 0.7–3.1)
LYMPHOCYTES NFR BLD AUTO: 12 %
MCH RBC QN AUTO: 20.8 PG (ref 26.6–33)
MCHC RBC AUTO-ENTMCNC: 28.5 G/DL (ref 31.5–35.7)
MCV RBC AUTO: 73 FL (ref 79–97)
MONOCYTES # BLD AUTO: 0.5 X10E3/UL (ref 0.1–0.9)
MONOCYTES NFR BLD AUTO: 8 %
NEUTROPHILS # BLD AUTO: 5.1 X10E3/UL (ref 1.4–7)
NEUTROPHILS NFR BLD AUTO: 78 %
PDF IMAGE, 910387: NORMAL
PLATELET # BLD AUTO: 271 X10E3/UL (ref 150–379)
POTASSIUM SERPL-SCNC: 4.8 MMOL/L (ref 3.5–5.2)
PROT SERPL-MCNC: 6.3 G/DL (ref 6–8.5)
RBC # BLD AUTO: 4 X10E6/UL (ref 3.77–5.28)
SODIUM SERPL-SCNC: 138 MMOL/L (ref 134–144)
TRIGL SERPL-MCNC: 87 MG/DL (ref 0–149)
VLDLC SERPL CALC-MCNC: 17 MG/DL (ref 5–40)
WBC # BLD AUTO: 6.6 X10E3/UL (ref 3.4–10.8)

## 2017-04-15 RX ORDER — FERROUS SULFATE 325(65) MG
TABLET, DELAYED RELEASE (ENTERIC COATED) ORAL
Qty: 60 TAB | Refills: 1 | Status: SHIPPED | OUTPATIENT
Start: 2017-04-15

## 2017-04-15 NOTE — PROGRESS NOTES
Call pt, the Hb is low again, start Iron bid, I have called in a Rx and recheck in 2 w  The sugar, and liver tests are normal  The kidney tests are stable  The Chol is great

## 2017-04-19 ENCOUNTER — TELEPHONE (OUTPATIENT)
Dept: FAMILY MEDICINE CLINIC | Age: 82
End: 2017-04-19

## 2017-04-19 PROBLEM — I50.9 CHF (CONGESTIVE HEART FAILURE) (HCC): Status: ACTIVE | Noted: 2017-04-19

## 2017-04-19 PROBLEM — I49.9 ARRHYTHMIA: Status: ACTIVE | Noted: 2017-04-19

## 2017-04-19 PROBLEM — J40 BRONCHITIS: Status: ACTIVE | Noted: 2017-04-19

## 2017-04-19 PROBLEM — I49.8 BRADYARRHYTHMIA: Status: ACTIVE | Noted: 2017-04-19

## 2017-04-19 LAB
HYDROCODONE UR-MCNC: 1262 NG/ML
HYDROMORPHONE UR-MCNC: 168 NG/ML

## 2017-04-19 NOTE — TELEPHONE ENCOUNTER
Patient's son called; told me that she had gone to hospital.  I explained my logic to him, he agree.

## 2017-04-19 NOTE — TELEPHONE ENCOUNTER
Called Mrs. Posadas - advised her to go to hospital - she stated that she's SOB, and nauseated from doxycycline. Sounded worse, to me, than she did last Friday, gasping and weak. Also called and left message on son's cell number, to notify him.

## 2017-04-20 PROBLEM — D50.9 IRON DEFICIENCY ANEMIA: Chronic | Status: ACTIVE | Noted: 2017-04-20

## 2017-04-20 PROBLEM — M81.0 OSTEOPOROSIS: Chronic | Status: ACTIVE | Noted: 2017-04-20

## 2017-04-21 PROBLEM — I27.20 PULMONARY HYPERTENSION (HCC): Status: ACTIVE | Noted: 2017-04-21

## 2017-04-21 PROBLEM — I05.0 MITRAL STENOSIS: Status: ACTIVE | Noted: 2017-04-21

## 2017-04-21 PROBLEM — I35.0 AORTIC STENOSIS, MODERATE: Status: ACTIVE | Noted: 2017-04-21

## 2017-04-24 PROBLEM — I50.31 ACUTE DIASTOLIC CHF (CONGESTIVE HEART FAILURE) (HCC): Status: ACTIVE | Noted: 2017-04-19

## 2017-04-24 PROBLEM — D50.9 IRON DEFICIENCY ANEMIA: Status: ACTIVE | Noted: 2017-04-20

## 2017-05-09 ENCOUNTER — HOSPITAL ENCOUNTER (INPATIENT)
Age: 82
LOS: 1 days | Discharge: NURSING, REHAB OR LONG-TERM CARE | DRG: 243 | End: 2017-05-11
Attending: EMERGENCY MEDICINE | Admitting: INTERNAL MEDICINE
Payer: MEDICARE

## 2017-05-09 ENCOUNTER — OFFICE VISIT (OUTPATIENT)
Dept: CARDIOLOGY CLINIC | Age: 82
End: 2017-05-09

## 2017-05-09 ENCOUNTER — APPOINTMENT (OUTPATIENT)
Dept: GENERAL RADIOLOGY | Age: 82
DRG: 243 | End: 2017-05-09
Attending: INTERNAL MEDICINE
Payer: MEDICARE

## 2017-05-09 ENCOUNTER — APPOINTMENT (OUTPATIENT)
Dept: GENERAL RADIOLOGY | Age: 82
DRG: 243 | End: 2017-05-09
Attending: EMERGENCY MEDICINE
Payer: MEDICARE

## 2017-05-09 VITALS
HEIGHT: 68 IN | DIASTOLIC BLOOD PRESSURE: 42 MMHG | WEIGHT: 163 LBS | BODY MASS INDEX: 24.71 KG/M2 | HEART RATE: 36 BPM | RESPIRATION RATE: 20 BRPM | SYSTOLIC BLOOD PRESSURE: 90 MMHG

## 2017-05-09 DIAGNOSIS — I49.5 SICK SINUS SYNDROME (HCC): Primary | ICD-10-CM

## 2017-05-09 DIAGNOSIS — I35.0 AORTIC STENOSIS, MODERATE: ICD-10-CM

## 2017-05-09 DIAGNOSIS — I49.8 BRADYARRHYTHMIA: ICD-10-CM

## 2017-05-09 DIAGNOSIS — I05.0 MITRAL VALVE STENOSIS, UNSPECIFIED ETIOLOGY: ICD-10-CM

## 2017-05-09 DIAGNOSIS — I44.2 COMPLETE HEART BLOCK (HCC): Primary | ICD-10-CM

## 2017-05-09 DIAGNOSIS — I44.2 COMPLETE HEART BLOCK (HCC): ICD-10-CM

## 2017-05-09 DIAGNOSIS — I10 SYSTOLIC HYPERTENSION, ISOLATED: ICD-10-CM

## 2017-05-09 DIAGNOSIS — I27.20 PULMONARY HYPERTENSION (HCC): ICD-10-CM

## 2017-05-09 DIAGNOSIS — I50.31 ACUTE DIASTOLIC CHF (CONGESTIVE HEART FAILURE) (HCC): ICD-10-CM

## 2017-05-09 DIAGNOSIS — I49.5 SICK SINUS SYNDROME (HCC): ICD-10-CM

## 2017-05-09 PROBLEM — I50.33 DIASTOLIC CHF, ACUTE ON CHRONIC (HCC): Status: ACTIVE | Noted: 2017-04-19

## 2017-05-09 LAB
ALBUMIN SERPL BCP-MCNC: 2.8 G/DL (ref 3.5–5)
ALBUMIN/GLOB SERPL: 0.9 {RATIO} (ref 1.1–2.2)
ALP SERPL-CCNC: 71 U/L (ref 45–117)
ALT SERPL-CCNC: 10 U/L (ref 12–78)
ANION GAP BLD CALC-SCNC: 11 MMOL/L (ref 5–15)
APPEARANCE UR: ABNORMAL
APTT PPP: 28.4 SEC (ref 22.1–32.5)
AST SERPL W P-5'-P-CCNC: 6 U/L (ref 15–37)
BACTERIA URNS QL MICRO: ABNORMAL /HPF
BASOPHILS # BLD AUTO: 0 K/UL (ref 0–0.1)
BASOPHILS # BLD: 0 % (ref 0–1)
BILIRUB SERPL-MCNC: 0.5 MG/DL (ref 0.2–1)
BILIRUB UR QL: NEGATIVE
BNP SERPL-MCNC: 4460 PG/ML (ref 0–450)
BUN SERPL-MCNC: 72 MG/DL (ref 6–20)
BUN/CREAT SERPL: 19 (ref 12–20)
CALCIUM SERPL-MCNC: 9.1 MG/DL (ref 8.5–10.1)
CAOX CRY URNS QL MICRO: ABNORMAL
CHLORIDE SERPL-SCNC: 102 MMOL/L (ref 97–108)
CK MB CFR SERPL CALC: 7 % (ref 0–2.5)
CK MB SERPL-MCNC: 1.4 NG/ML (ref 5–25)
CK SERPL-CCNC: 20 U/L (ref 26–192)
CO2 SERPL-SCNC: 22 MMOL/L (ref 21–32)
COLOR UR: ABNORMAL
CREAT SERPL-MCNC: 3.76 MG/DL (ref 0.55–1.02)
EOSINOPHIL # BLD: 0.1 K/UL (ref 0–0.4)
EOSINOPHIL NFR BLD: 1 % (ref 0–7)
EPITH CASTS URNS QL MICRO: ABNORMAL /LPF
ERYTHROCYTE [DISTWIDTH] IN BLOOD BY AUTOMATED COUNT: 20.7 % (ref 11.5–14.5)
GLOBULIN SER CALC-MCNC: 3.1 G/DL (ref 2–4)
GLUCOSE SERPL-MCNC: 74 MG/DL (ref 65–100)
GLUCOSE UR STRIP.AUTO-MCNC: NEGATIVE MG/DL
HCT VFR BLD AUTO: 27.3 % (ref 35–47)
HGB BLD-MCNC: 8.3 G/DL (ref 11.5–16)
HGB UR QL STRIP: ABNORMAL
INR PPP: 1.1 (ref 0.9–1.1)
KETONES UR QL STRIP.AUTO: NEGATIVE MG/DL
LEUKOCYTE ESTERASE UR QL STRIP.AUTO: ABNORMAL
LYMPHOCYTES # BLD AUTO: 14 % (ref 12–49)
LYMPHOCYTES # BLD: 0.7 K/UL (ref 0.8–3.5)
MAGNESIUM SERPL-MCNC: 2.4 MG/DL (ref 1.6–2.4)
MCH RBC QN AUTO: 22.1 PG (ref 26–34)
MCHC RBC AUTO-ENTMCNC: 30.4 G/DL (ref 30–36.5)
MCV RBC AUTO: 72.8 FL (ref 80–99)
MONOCYTES # BLD: 0.3 K/UL (ref 0–1)
MONOCYTES NFR BLD AUTO: 7 % (ref 5–13)
NEUTS SEG # BLD: 3.8 K/UL (ref 1.8–8)
NEUTS SEG NFR BLD AUTO: 78 % (ref 32–75)
NITRITE UR QL STRIP.AUTO: NEGATIVE
PH UR STRIP: 5.5 [PH] (ref 5–8)
PLATELET # BLD AUTO: 161 K/UL (ref 150–400)
POTASSIUM SERPL-SCNC: 6 MMOL/L (ref 3.5–5.1)
PROT SERPL-MCNC: 5.9 G/DL (ref 6.4–8.2)
PROT UR STRIP-MCNC: 30 MG/DL
PROTHROMBIN TIME: 10.6 SEC (ref 9–11.1)
RBC # BLD AUTO: 3.75 M/UL (ref 3.8–5.2)
RBC #/AREA URNS HPF: ABNORMAL /HPF (ref 0–5)
RBC MORPH BLD: ABNORMAL
SODIUM SERPL-SCNC: 135 MMOL/L (ref 136–145)
SP GR UR REFRACTOMETRY: 1.02 (ref 1–1.03)
THERAPEUTIC RANGE,PTTT: NORMAL SECS (ref 58–77)
TROPONIN I SERPL-MCNC: <0.04 NG/ML
UA: UC IF INDICATED,UAUC: ABNORMAL
UROBILINOGEN UR QL STRIP.AUTO: 0.2 EU/DL (ref 0.2–1)
WBC # BLD AUTO: 4.9 K/UL (ref 3.6–11)
WBC URNS QL MICRO: >100 /HPF (ref 0–4)

## 2017-05-09 PROCEDURE — 77030031139 HC SUT VCRL2 J&J -A

## 2017-05-09 PROCEDURE — 80053 COMPREHEN METABOLIC PANEL: CPT | Performed by: EMERGENCY MEDICINE

## 2017-05-09 PROCEDURE — 99218 HC RM OBSERVATION: CPT

## 2017-05-09 PROCEDURE — 74011250636 HC RX REV CODE- 250/636: Performed by: INTERNAL MEDICINE

## 2017-05-09 PROCEDURE — 81001 URINALYSIS AUTO W/SCOPE: CPT | Performed by: EMERGENCY MEDICINE

## 2017-05-09 PROCEDURE — 74011250636 HC RX REV CODE- 250/636

## 2017-05-09 PROCEDURE — 33208 INSRT HEART PM ATRIAL & VENT: CPT

## 2017-05-09 PROCEDURE — 85730 THROMBOPLASTIN TIME PARTIAL: CPT | Performed by: EMERGENCY MEDICINE

## 2017-05-09 PROCEDURE — 77030002996 HC SUT SLK J&J -A

## 2017-05-09 PROCEDURE — 74011000250 HC RX REV CODE- 250

## 2017-05-09 PROCEDURE — 74011250637 HC RX REV CODE- 250/637: Performed by: INTERNAL MEDICINE

## 2017-05-09 PROCEDURE — 93005 ELECTROCARDIOGRAM TRACING: CPT

## 2017-05-09 PROCEDURE — 87186 SC STD MICRODIL/AGAR DIL: CPT | Performed by: EMERGENCY MEDICINE

## 2017-05-09 PROCEDURE — 83735 ASSAY OF MAGNESIUM: CPT | Performed by: EMERGENCY MEDICINE

## 2017-05-09 PROCEDURE — 82550 ASSAY OF CK (CPK): CPT | Performed by: EMERGENCY MEDICINE

## 2017-05-09 PROCEDURE — L3670 SO ACRO/CLAV CAN WEB PRE OTS: HCPCS

## 2017-05-09 PROCEDURE — C1785 PMKR, DUAL, RATE-RESP: HCPCS

## 2017-05-09 PROCEDURE — 02H63JZ INSERTION OF PACEMAKER LEAD INTO RIGHT ATRIUM, PERCUTANEOUS APPROACH: ICD-10-PCS | Performed by: INTERNAL MEDICINE

## 2017-05-09 PROCEDURE — 77030018729 HC ELECTRD DEFIB PAD CARD -B

## 2017-05-09 PROCEDURE — 77030011640 HC PAD GRND REM COVD -A

## 2017-05-09 PROCEDURE — 0JH606Z INSERTION OF PACEMAKER, DUAL CHAMBER INTO CHEST SUBCUTANEOUS TISSUE AND FASCIA, OPEN APPROACH: ICD-10-PCS | Performed by: INTERNAL MEDICINE

## 2017-05-09 PROCEDURE — 36415 COLL VENOUS BLD VENIPUNCTURE: CPT | Performed by: EMERGENCY MEDICINE

## 2017-05-09 PROCEDURE — C1892 INTRO/SHEATH,FIXED,PEEL-AWAY: HCPCS

## 2017-05-09 PROCEDURE — C1898 LEAD, PMKR, OTHER THAN TRANS: HCPCS

## 2017-05-09 PROCEDURE — 02HK3JZ INSERTION OF PACEMAKER LEAD INTO RIGHT VENTRICLE, PERCUTANEOUS APPROACH: ICD-10-PCS | Performed by: INTERNAL MEDICINE

## 2017-05-09 PROCEDURE — 71010 XR CHEST PORT: CPT

## 2017-05-09 PROCEDURE — 87077 CULTURE AEROBIC IDENTIFY: CPT | Performed by: EMERGENCY MEDICINE

## 2017-05-09 PROCEDURE — C1894 INTRO/SHEATH, NON-LASER: HCPCS

## 2017-05-09 PROCEDURE — 65660000000 HC RM CCU STEPDOWN

## 2017-05-09 PROCEDURE — 85025 COMPLETE CBC W/AUTO DIFF WBC: CPT | Performed by: EMERGENCY MEDICINE

## 2017-05-09 PROCEDURE — 85610 PROTHROMBIN TIME: CPT | Performed by: EMERGENCY MEDICINE

## 2017-05-09 PROCEDURE — 99285 EMERGENCY DEPT VISIT HI MDM: CPT

## 2017-05-09 PROCEDURE — 83880 ASSAY OF NATRIURETIC PEPTIDE: CPT | Performed by: EMERGENCY MEDICINE

## 2017-05-09 PROCEDURE — 84484 ASSAY OF TROPONIN QUANT: CPT | Performed by: EMERGENCY MEDICINE

## 2017-05-09 PROCEDURE — 87086 URINE CULTURE/COLONY COUNT: CPT | Performed by: EMERGENCY MEDICINE

## 2017-05-09 PROCEDURE — 77030018836 HC SOL IRR NACL ICUM -A

## 2017-05-09 RX ORDER — SODIUM CHLORIDE 0.9 % (FLUSH) 0.9 %
5-10 SYRINGE (ML) INJECTION AS NEEDED
Status: DISCONTINUED | OUTPATIENT
Start: 2017-05-09 | End: 2017-05-11 | Stop reason: HOSPADM

## 2017-05-09 RX ORDER — BACITRACIN 50000 [IU]/1
50000 INJECTION, POWDER, FOR SOLUTION INTRAMUSCULAR ONCE
Status: COMPLETED | OUTPATIENT
Start: 2017-05-09 | End: 2017-05-09

## 2017-05-09 RX ORDER — SODIUM CHLORIDE 0.9 % (FLUSH) 0.9 %
5-10 SYRINGE (ML) INJECTION EVERY 8 HOURS
Status: DISCONTINUED | OUTPATIENT
Start: 2017-05-09 | End: 2017-05-11 | Stop reason: HOSPADM

## 2017-05-09 RX ORDER — MIDAZOLAM HYDROCHLORIDE 1 MG/ML
INJECTION, SOLUTION INTRAMUSCULAR; INTRAVENOUS
Status: COMPLETED
Start: 2017-05-09 | End: 2017-05-09

## 2017-05-09 RX ORDER — HYDROCHLOROTHIAZIDE 25 MG/1
25 TABLET ORAL DAILY
Status: DISCONTINUED | OUTPATIENT
Start: 2017-05-10 | End: 2017-05-10

## 2017-05-09 RX ORDER — SPIRONOLACTONE 25 MG/1
25 TABLET ORAL DAILY
Status: DISCONTINUED | OUTPATIENT
Start: 2017-05-10 | End: 2017-05-10

## 2017-05-09 RX ORDER — ALPRAZOLAM 0.25 MG/1
0.25 TABLET ORAL
Status: DISCONTINUED | OUTPATIENT
Start: 2017-05-09 | End: 2017-05-11 | Stop reason: HOSPADM

## 2017-05-09 RX ORDER — MIDAZOLAM HYDROCHLORIDE 1 MG/ML
.5-2 INJECTION, SOLUTION INTRAMUSCULAR; INTRAVENOUS
Status: DISCONTINUED | OUTPATIENT
Start: 2017-05-09 | End: 2017-05-09 | Stop reason: HOSPADM

## 2017-05-09 RX ORDER — GABAPENTIN 100 MG/1
100 CAPSULE ORAL
Status: DISCONTINUED | OUTPATIENT
Start: 2017-05-09 | End: 2017-05-11 | Stop reason: HOSPADM

## 2017-05-09 RX ORDER — LORATADINE 10 MG/1
10 TABLET ORAL DAILY
Status: DISCONTINUED | OUTPATIENT
Start: 2017-05-10 | End: 2017-05-11 | Stop reason: HOSPADM

## 2017-05-09 RX ORDER — BUMETANIDE 1 MG/1
1 TABLET ORAL DAILY
Status: DISCONTINUED | OUTPATIENT
Start: 2017-05-10 | End: 2017-05-10

## 2017-05-09 RX ORDER — HYDROCHLOROTHIAZIDE 25 MG/1
25 TABLET ORAL DAILY
Status: DISCONTINUED | OUTPATIENT
Start: 2017-05-10 | End: 2017-05-09

## 2017-05-09 RX ORDER — HEPARIN SODIUM 200 [USP'U]/100ML
1000 INJECTION, SOLUTION INTRAVENOUS ONCE
Status: COMPLETED | OUTPATIENT
Start: 2017-05-09 | End: 2017-05-09

## 2017-05-09 RX ORDER — BACITRACIN 50000 [IU]/1
INJECTION, POWDER, FOR SOLUTION INTRAMUSCULAR
Status: COMPLETED
Start: 2017-05-09 | End: 2017-05-09

## 2017-05-09 RX ORDER — SODIUM CHLORIDE 9 MG/ML
100 INJECTION, SOLUTION INTRAVENOUS CONTINUOUS
Status: DISPENSED | OUTPATIENT
Start: 2017-05-09 | End: 2017-05-11

## 2017-05-09 RX ORDER — VALSARTAN 80 MG/1
320 TABLET ORAL DAILY
Status: DISCONTINUED | OUTPATIENT
Start: 2017-05-10 | End: 2017-05-10

## 2017-05-09 RX ORDER — NALOXONE HYDROCHLORIDE 0.4 MG/ML
0.4 INJECTION, SOLUTION INTRAMUSCULAR; INTRAVENOUS; SUBCUTANEOUS AS NEEDED
Status: DISCONTINUED | OUTPATIENT
Start: 2017-05-09 | End: 2017-05-11 | Stop reason: HOSPADM

## 2017-05-09 RX ORDER — PANTOPRAZOLE SODIUM 40 MG/1
40 TABLET, DELAYED RELEASE ORAL
Status: DISCONTINUED | OUTPATIENT
Start: 2017-05-10 | End: 2017-05-11 | Stop reason: HOSPADM

## 2017-05-09 RX ORDER — GUAIFENESIN 100 MG/5ML
81 LIQUID (ML) ORAL DAILY
Status: DISCONTINUED | OUTPATIENT
Start: 2017-05-10 | End: 2017-05-11 | Stop reason: HOSPADM

## 2017-05-09 RX ORDER — HYDROCODONE BITARTRATE AND ACETAMINOPHEN 5; 325 MG/1; MG/1
1 TABLET ORAL
Status: DISCONTINUED | OUTPATIENT
Start: 2017-05-09 | End: 2017-05-11 | Stop reason: HOSPADM

## 2017-05-09 RX ORDER — SUCRALFATE 1 G/10ML
1 SUSPENSION ORAL
Status: DISCONTINUED | OUTPATIENT
Start: 2017-05-09 | End: 2017-05-11 | Stop reason: HOSPADM

## 2017-05-09 RX ORDER — VANCOMYCIN HYDROCHLORIDE 1 G/20ML
INJECTION, POWDER, LYOPHILIZED, FOR SOLUTION INTRAVENOUS
Status: DISCONTINUED
Start: 2017-05-09 | End: 2017-05-11 | Stop reason: HOSPADM

## 2017-05-09 RX ORDER — CILOSTAZOL 100 MG/1
100 TABLET ORAL
Status: DISCONTINUED | OUTPATIENT
Start: 2017-05-09 | End: 2017-05-11 | Stop reason: HOSPADM

## 2017-05-09 RX ORDER — FENTANYL CITRATE 50 UG/ML
25-50 INJECTION, SOLUTION INTRAMUSCULAR; INTRAVENOUS
Status: DISCONTINUED | OUTPATIENT
Start: 2017-05-09 | End: 2017-05-09 | Stop reason: HOSPADM

## 2017-05-09 RX ORDER — SODIUM CHLORIDE 900 MG/100ML
INJECTION INTRAVENOUS
Status: DISCONTINUED
Start: 2017-05-09 | End: 2017-05-11 | Stop reason: HOSPADM

## 2017-05-09 RX ORDER — LIDOCAINE HYDROCHLORIDE 10 MG/ML
INJECTION INFILTRATION; PERINEURAL
Status: COMPLETED
Start: 2017-05-09 | End: 2017-05-09

## 2017-05-09 RX ORDER — ALBUTEROL SULFATE 90 UG/1
2 AEROSOL, METERED RESPIRATORY (INHALATION)
Status: DISCONTINUED | OUTPATIENT
Start: 2017-05-09 | End: 2017-05-11 | Stop reason: HOSPADM

## 2017-05-09 RX ORDER — SENNOSIDES 8.6 MG/1
1 TABLET ORAL DAILY
Status: DISCONTINUED | OUTPATIENT
Start: 2017-05-10 | End: 2017-05-11 | Stop reason: HOSPADM

## 2017-05-09 RX ORDER — DOCUSATE SODIUM 100 MG/1
100 CAPSULE, LIQUID FILLED ORAL
Status: DISCONTINUED | OUTPATIENT
Start: 2017-05-09 | End: 2017-05-11 | Stop reason: HOSPADM

## 2017-05-09 RX ORDER — GEMFIBROZIL 600 MG/1
600 TABLET, FILM COATED ORAL
Status: DISCONTINUED | OUTPATIENT
Start: 2017-05-09 | End: 2017-05-11 | Stop reason: HOSPADM

## 2017-05-09 RX ORDER — CITALOPRAM 20 MG/1
40 TABLET, FILM COATED ORAL DAILY
Status: DISCONTINUED | OUTPATIENT
Start: 2017-05-10 | End: 2017-05-11 | Stop reason: HOSPADM

## 2017-05-09 RX ORDER — FENTANYL CITRATE 50 UG/ML
INJECTION, SOLUTION INTRAMUSCULAR; INTRAVENOUS
Status: COMPLETED
Start: 2017-05-09 | End: 2017-05-09

## 2017-05-09 RX ORDER — ADHESIVE BANDAGE
30 BANDAGE TOPICAL
Status: DISCONTINUED | OUTPATIENT
Start: 2017-05-09 | End: 2017-05-11 | Stop reason: HOSPADM

## 2017-05-09 RX ORDER — LIDOCAINE HYDROCHLORIDE 10 MG/ML
1-40 INJECTION INFILTRATION; PERINEURAL
Status: DISCONTINUED | OUTPATIENT
Start: 2017-05-09 | End: 2017-05-11 | Stop reason: HOSPADM

## 2017-05-09 RX ORDER — ALBUTEROL SULFATE 0.83 MG/ML
2.5 SOLUTION RESPIRATORY (INHALATION)
Status: DISCONTINUED | OUTPATIENT
Start: 2017-05-09 | End: 2017-05-11 | Stop reason: HOSPADM

## 2017-05-09 RX ORDER — VALSARTAN 80 MG/1
320 TABLET ORAL DAILY
Status: DISCONTINUED | OUTPATIENT
Start: 2017-05-10 | End: 2017-05-09

## 2017-05-09 RX ADMIN — FENTANYL CITRATE 50 MCG: 50 INJECTION, SOLUTION INTRAMUSCULAR; INTRAVENOUS at 17:15

## 2017-05-09 RX ADMIN — Medication 10 ML: at 21:10

## 2017-05-09 RX ADMIN — MIDAZOLAM HYDROCHLORIDE 2 MG: 1 INJECTION, SOLUTION INTRAMUSCULAR; INTRAVENOUS at 17:10

## 2017-05-09 RX ADMIN — GABAPENTIN 100 MG: 100 CAPSULE ORAL at 21:10

## 2017-05-09 RX ADMIN — LIDOCAINE HYDROCHLORIDE 30 ML: 10 INJECTION, SOLUTION INFILTRATION; PERINEURAL at 17:12

## 2017-05-09 RX ADMIN — CILOSTAZOL 100 MG: 100 TABLET ORAL at 19:29

## 2017-05-09 RX ADMIN — MIDAZOLAM HYDROCHLORIDE 2 MG: 1 INJECTION INTRAMUSCULAR; INTRAVENOUS at 17:10

## 2017-05-09 RX ADMIN — BACITRACIN 50000 UNITS: 5000 INJECTION, POWDER, FOR SOLUTION INTRAMUSCULAR at 17:29

## 2017-05-09 RX ADMIN — SUCRALFATE 1 G: 1 SUSPENSION ORAL at 19:29

## 2017-05-09 RX ADMIN — VANCOMYCIN HYDROCHLORIDE 1000 MG: 1 INJECTION, POWDER, LYOPHILIZED, FOR SOLUTION INTRAVENOUS at 16:50

## 2017-05-09 RX ADMIN — LIDOCAINE HYDROCHLORIDE 30 ML: 10 INJECTION INFILTRATION; PERINEURAL at 17:12

## 2017-05-09 RX ADMIN — BACITRACIN 50000 UNITS: 50000 INJECTION, POWDER, FOR SOLUTION INTRAMUSCULAR at 17:29

## 2017-05-09 RX ADMIN — GEMFIBROZIL 600 MG: 600 TABLET ORAL at 19:29

## 2017-05-09 RX ADMIN — HEPARIN SODIUM 1000 UNITS: 200 INJECTION, SOLUTION INTRAVENOUS at 17:22

## 2017-05-09 NOTE — DISCHARGE INSTRUCTIONS
[address] [address]  [phone]        Patient ID:  Darlene Gruber  [id number]  89 y.o.  2/28/1925    Admit Date: 5/9/2017    Discharge Date: 5/11/2017     Admitting Physician: Jessica Shirley MD     Discharge Physician: Catrachita Blanco NP    Admission Diagnoses: chb  Complete heart block [facility])    Discharge Diagnoses: Active Problems:    Complete heart block ([facility] (5/9/2017)      S/P cardiac pacemaker procedure (5/11/2017)      Overview: 5/9/17 [employer] dual chamber pacemaker      Acute renal failure ([facility] (5/11/2017)        Discharge Condition: Good    Cardiology Procedures this Admission:  [address] dual chamber pacemaker implant    Disposition: SNF    Patient Instructions:     CHEMISTRY LABS ON 5/15/17 TO EVALUATE RENAL FUNCTION      Reference discharge instructions provided by nursing for diet and activity. Signed:  Catrachita Blanco NP  5/11/2017  2:10 PM              DISCHARGE INSTRUCTIONS FOR PATIENTS WITH ICD'S AND PACEMAKERS    1. Carry you ID card for your ICD/Pacemaker with you at all times. This card will be given to you in the hospital or mailed to you. 2. Medic Alert Bracelets are available from your pharmacist to wear at all times. 3. Call for an appointment in 2 weeks [phone]. 4. The pacemaker will bulge slightly under your skin. An ICD bulges a little more because it is larger. The bulge will decrease in size over the next few weeks. Please notify the doctor's office if you notice any of the following around your ICD site:  A.  A bruise that does not go away  B. Soreness or yellow, green, or brown drainage from the site. C. Any swelling from the site. D. If you have a fever of 100 degrees or higher that lasts for a few days    INCISION CARE       1.  Leave dressing over your site until you see the doctor.    2.  Leave steri-strips over your site until they start to fall off.   3.   You may shower after as long as your incision isnt submerged or directly sprayed upon until well healed. 4.  For comfort, wear loose fitting clothing. 5.  Ice pack to affected shoulder for first 24 hours, wear your sling for 2 days. 6.  Report any signs of infection, fever, pain, swelling, redness, oozing, or heat at site especially if these symptoms increase after the first 3 to 4 days. ACTIVITY PRECAUTIONS     1. Avoid rough contact with the implant site. 2. No driving for 14 days. 3. Avoid lifting your arm over your head, carrying anything on the affected side, or lifting over 10 pounds for 30 days. For the first 2 days only bend your arm at the elbow. 4. Any extreme activity such as golf, weight lifting or exercise biking should be restricted for 60 days. 5. Do not carry objects by holding them against your implant site. 6.  No shooting rifles or any type of gun with the affected shoulder permanently. 7.  If you have an ICD, welding and chainsaws are prohibited. SPECIAL PRECAUTIONS     1. You should avoid all strong magnetic fields, such as arc welding, large transformers, large motors. Some ICD devices will beep if it detects a strong magnet. If this occurs, move out of the area. 2.  You may not have an MRI. 3.  Treatments or surgery that requires diathermy or electrocautery should be discussed with your doctor before scheduled. 4. Avoid radio frequency transmitters, including radar. 5. Advise dentist or other medical personnel you see that you have a pacemaker or ICD. 6.  Cell phones and microwave oven use is okay. 7.  If you plan to move or take a trip to a new area, the doctor's office will give you a name of a doctor to contact for any problems. SPECIAL INSTRUCTIONS ON SHOCKS   1. Notify your doctor for any of the following:      A. Anytime a shock is received in a 24 hour period. An office visit is not usually required for a single shock. B.  Two or more shocks in a row.   If you do not feel well, call the [facility], otherwise call your doctor. This may require an office visit. C. Two or more shocks spaced apart by several hours. This may require an office visit. 2.  Keep a record of events. Include date, time, symptoms and activity at that time. ANTIBIOTIC THERAPY    During the first 8 weeks after your pacemaker or ICD insertion, you may need antibiotics before any dental work or certain tests or operations. Let the dentist or doctor who is caring for you know that you have had an implanted device.

## 2017-05-09 NOTE — IP AVS SNAPSHOT
[address] [facility] 
[phone] Patient: Eneida Jones 
MRN: [id number] You are allergic to the following Allergen Reactions Amoxicillin Unknown (comments) Biaxin (Clarithromycin) Unknown (comments) Celebrex (Celecoxib) Other (comments) Blood in urine Indocin (Indomethacin) Unknown (comments) Zithromax (Azithromycin) Unknown (comments) Patient said she is allergic all Mycins Recent Documentation Weight BMI OB Status Smoking Status 73.4 kg 29.6 kg/m2 Menopause Never Smoker Emergency Contacts Name Discharge Info Relation Home Work Mobile [place] DISCHARGE CAREGIVER [3] Son [22] [phone] Larissa Roach  Child [2] [phone] About your hospitalization You were admitted on:  May 9, 2017 You last received care in the:  [facility] [place] [facility] CARDIO You were discharged on:  May 11, 2017 Unit phone number:  [phone] Why you were hospitalized Your primary diagnosis was:  Not on File Your diagnoses also included:  Complete Heart Block (Hcc), S/P Cardiac Pacemaker Procedure, Acute Renal Failure (Hcc) Providers Seen During Your Hospitalizations Provider Role Specialty Primary office phone Edgar Killian MD Attending Provider Emergency Medicine [phone] Ashtyn Menjivar MD Attending Provider Cardiology [phone] Your Primary Care Physician (PCP) Primary Care Physician Office Phone Office Fax [facility], Justin Mcdermott [phone] Follow-up Information Follow up With Details Comments Contact Info Jaida Sims MD On 5/15/2017 at [place] [address] 
[phone] Tere Adair MD On 2017 at 11:20AM [address] [address] [phone] Your Appointments Monday May 15, 2017 10:00 AM EDT  
ESTABLISHED PATIENT with Colby Childress MD  
[address] [facility] ([facility]) [facility] [address]  
[phone] Thursday June 01, 2017 11:40 AM EDT  
ESTABLISHED PATIENT with Jeannie Mendez MD  
[address] [facility]) [address] [facility] [place]  [id number] [phone] Current Discharge Medication List  
  
CONTINUE these medications which have NOT CHANGED Dose & Instructions Dispensing Information Comments Morning Noon Evening Bedtime  
 acetaminophen 325 mg tablet Commonly known as:  TYLENOL Your last dose was: Your next dose is:    
   
   
 Dose:  650 mg Take 2 Tabs by mouth every four (4) hours as needed. Quantity:  30 Tab Refills:  0  
     
   
   
   
  
 * albuterol 90 mcg/actuation inhaler Commonly known as:  VENTOLIN HFA Your last dose was: Your next dose is: SHAKE WELL. INHALE 2 PUFFS (1 MINUTE APART) EVERY 4 HOURS AS NEEDED FOR COUGH OR WHEEZE Quantity:  1 Inhaler Refills:  5  
     
   
   
   
  
 * albuterol 2.5 mg /3 mL (0.083 %) nebulizer solution Commonly known as:  PROVENTIL VENTOLIN Your last dose was: Your next dose is:    
   
   
 Dose:  2.5 mg  
3 mL by Nebulization route every four (4) hours as needed for Wheezing. Quantity:  24 Each Refills:  0 ALPRAZolam 0.25 mg tablet Commonly known as:  Blenda Browner Your last dose was: Your next dose is:    
   
   
 Dose:  0.25 mg Take 1 Tab by mouth three (3) times daily as needed for Anxiety. Max Daily Amount: 0.75 mg. Quantity:  12 Tab Refills:  0  
     
   
   
   
  
 ascorbic acid (vitamin C) 500 mg tablet Commonly known as:  VITAMIN C Your last dose was: Your next dose is: Take  by mouth. Refills:  0  
     
   
   
   
  
 aspirin 81 mg chewable tablet Your last dose was: Your next dose is:    
   
   
 Dose:  81 mg Take 1 Tab by mouth daily. Quantity:  30 Tab Refills:  0 Calcium Carbonate-Vit D3-Min 600 mg calcium- 400 unit Tab Your last dose was: Your next dose is: Take  by mouth two (2) times a day. Refills:  0  
     
   
   
   
  
 cilostazol 100 mg tablet Commonly known as:  PLETAL Your last dose was: Your next dose is: One bid Quantity:  180 Tab Refills:  3  
     
   
   
   
  
 citalopram 40 mg tablet Commonly known as:  Evan Mohamud Your last dose was: Your next dose is: TAKE 1 TABLET BY MOUTH AT BEDTIME FOR DEPRESSION Quantity:  30 Tab Refills:  5  
     
   
   
   
  
 docusate sodium 100 mg capsule Commonly known as:  Columbus Angle Your last dose was: Your next dose is:    
   
   
 Dose:  100 mg Take 1 Cap by mouth two (2) times daily as needed for Constipation for up to 90 days. Quantity:  60 Cap Refills:  0  
     
   
   
   
  
 ferrous sulfate 325 mg (65 mg iron) EC tablet Commonly known as:  IRON Your last dose was: Your next dose is: Take one twice a day Quantity:  60 Tab Refills:  1  
     
   
   
   
  
 gabapentin 100 mg capsule Commonly known as:  NEURONTIN Your last dose was: Your next dose is: TAKE ONE CAPSULE BY MOUTH AT BEDTIME FOR NEUROPATHIC PAIN Quantity:  30 Cap Refills:  5  
     
   
   
   
  
 gemfibrozil 600 mg tablet Commonly known as:  LOPID Your last dose was: Your next dose is: TAKE 1 TABLET BY MOUTH TWICE DAILY FOR CHOLESTROL Quantity:  60 Tab Refills:  5 HYDROcodone-acetaminophen 5-325 mg per tablet Commonly known as:  Raman Kelly Your last dose was: Your next dose is:    
   
   
 Dose:  1 Tab Take 1 Tab by mouth every eight (8) hours as needed. Max Daily Amount: 3 Tabs. Quantity:  90 Tab Refills:  0 KRILL -93-60-50 mg Cap Generic drug:  krill-omega-3-dha-epa-lipids Your last dose was: Your next dose is: Take  by mouth. Refills:  0  
     
   
   
   
  
 loratadine 10 mg tablet Commonly known as:  Vonna Hedger Your last dose was: Your next dose is:    
   
   
 Dose:  10 mg Take 1 Tab by mouth daily. Quantity:  30 Tab Refills:  0  
     
   
   
   
  
 magnesium hydroxide 400 mg/5 mL suspension Commonly known as:  MILK OF MAGNESIA Your last dose was: Your next dose is:    
   
   
 Dose:  30 mL Take 30 mL by mouth daily as needed for Constipation. Quantity:  120 mL Refills:  0 MUCUS RELIEF 400 mg tablet Generic drug:  guaiFENesin Your last dose was: Your next dose is: Take  by mouth two (2) times a day. Refills:  0  
     
   
   
   
  
 naloxone 4 mg/actuation Spry Your last dose was: Your next dose is:    
   
   
 Dose:  1 Spray 1 Spray by Nasal route as needed. Quantity:  1 Cartridge Refills:  1  
     
   
   
   
  
 omeprazole 20 mg capsule Commonly known as:  PRILOSEC Your last dose was: Your next dose is: TAKE ONE CAPSULE BY MOUTH DAILY FOR STOMACH Quantity:  30 Cap Refills:  5  
     
   
   
   
  
 polyethylene glycol 17 gram packet Commonly known as:  Ellanuj Grout Your last dose was: Your next dose is:    
   
   
 Dose:  17 g Take 1 Packet by mouth daily as needed. Quantity:  30 Packet Refills:  0 QVAR 40 mcg/actuation [employer] Generic drug:  beclomethasone Your last dose was: Your next dose is:    
   
   
 Dose:  2 Puff Take 2 Puffs by inhalation two (2) times a day. Refills:  0  
     
   
   
   
  
 senna 8.6 mg tablet Commonly known as:  Ad Andrewdiane Londono Your last dose was: Your next dose is:    
   
   
 Dose:  1 Tab Take 1 Tab by mouth daily. Quantity:  30 Tab Refills:  0  
     
   
   
   
  
 simethicone 125 mg capsule Commonly known as:  GAS-X Your last dose was: Your next dose is:    
   
   
 Dose:  125 mg Take 125 mg by mouth four (4) times daily as needed for Flatulence. Refills:  0  
     
   
   
   
  
 spironolactone 25 mg tablet Commonly known as:  ALDACTONE Your last dose was: Your next dose is:    
   
   
 Dose:  25 mg Take 1 Tab by mouth daily. Quantity:  30 Tab Refills:  0  
     
   
   
   
  
 sucralfate 100 mg/mL suspension Commonly known as:  Eduar Chutingham Your last dose was: Your next dose is:    
   
   
 Dose:  1 g Take 10 mL by mouth Before breakfast, lunch, and dinner. Quantity:  120 mL Refills:  0  
     
   
   
   
  
 * Notice: This list has 2 medication(s) that are the same as other medications prescribed for you. Read the directions carefully, and ask your doctor or other care provider to review them with you. STOP taking these medications   
 bumetanide 1 mg tablet Commonly known as:  BUMEX  
   
  
 hydroCHLOROthiazide 25 mg tablet Commonly known as:  HYDRODIURIL  
   
  
 valsartan 320 mg tablet Commonly known as:  DIOVAN Discharge Instructions [address] [place] [id number] Patient ID: Darlene Gruber 
[id number] 
86 y.o. 
2/28/1925 Admit Date: 5/9/2017 Discharge Date: 5/11/2017 Admitting Physician: Jessica Shirley MD  
 
Discharge Physician: Catrachita Blanco NP Admission Diagnoses: chb 
Complete heart block ([facility] [address] Discharge Diagnoses: Active Problems: 
  Complete heart block ([facility] [address] (5/9/2017) S/P cardiac pacemaker procedure (5/11/2017) Overview: 5/9/17 [employer] dual chamber pacemaker Acute renal failure ([facility] [address] (5/11/2017) Discharge Condition: Good Cardiology Procedures this Admission:  [address] dual chamber pacemaker implant Disposition: [facility] Patient Instructions: CHEMISTRY LABS ON 5/15/17 TO EVALUATE RENAL FUNCTION Reference discharge instructions provided by nursing for diet and activity. Signed: 
Daryn Esquivel NP 
5/11/2017 
2:10 PM 
 
 
 
 
 
 
DISCHARGE INSTRUCTIONS FOR PATIENTS WITH ICD'S AND PACEMAKERS 1. Carry you ID card for your ICD/Pacemaker with you at all times. This card will be given to you in the hospital or mailed to you. 2. Medic Alert Bracelets are available from your pharmacist to wear at all times. 3. Call for an appointment in 2 weeks [phone]. 4. The pacemaker will bulge slightly under your skin. An ICD bulges a little more because it is larger. The bulge will decrease in size over the next few weeks. Please notify the doctor's office if you notice any of the following around your ICD site: A.  A bruise that does not go away B. Soreness or yellow, green, or brown drainage from the site. C. Any swelling from the site. D. If you have a fever of 100 degrees or higher that lasts for a few days INCISION CARE 1.  Leave dressing over your site until you see the doctor. 2.  Leave steri-strips over your site until they start to fall off.  
3.   You may shower after as long as your incision isnt submerged or directly sprayed upon until well healed. 4.  For comfort, wear loose fitting clothing. 5.  Ice pack to affected shoulder for first 24 hours, wear your sling for 2 days. 6.  Report any signs of infection, fever, pain, swelling, redness, oozing, or heat at site especially if these symptoms increase after the first 3 to 4 days. ACTIVITY PRECAUTIONS 1. Avoid rough contact with the implant site. 2. No driving for 14 days. 3. Avoid lifting your arm over your head, carrying anything on the affected side, or lifting over 10 pounds for 30 days. For the first 2 days only bend your arm at the elbow. 4. Any extreme activity such as golf, weight lifting or exercise biking should be restricted for 60 days. 5. Do not carry objects by holding them against your implant site. 6.  No shooting rifles or any type of gun with the affected shoulder permanently. 7.  If you have an ICD, welding and chainsaws are prohibited. SPECIAL PRECAUTIONS 1. You should avoid all strong magnetic fields, such as arc welding, large transformers, large motors. Some ICD devices will beep if it detects a strong magnet. If this occurs, move out of the area. 2.  You may not have an MRI. 3.  Treatments or surgery that requires diathermy or electrocautery should be discussed with your doctor before scheduled. 4. Avoid radio frequency transmitters, including radar. 5. Advise dentist or other medical personnel you see that you have a pacemaker or ICD. 6.  Cell phones and microwave oven use is okay. 7.  If you plan to move or take a trip to a new area, the doctor's office will give you a name of a doctor to contact for any problems. SPECIAL INSTRUCTIONS ON SHOCKS 1. Notify your doctor for any of the following: A. Anytime a shock is received in a 24 hour period. An office visit is not usually required for a single shock. B.  Two or more shocks in a row. If you do not feel well, call the [facility] [employer], otherwise call your doctor. This may require an office visit. C. Two or more shocks spaced apart by several hours. This may require an office visit. 2.  Keep a record of events. Include date, time, symptoms and activity at that time. ANTIBIOTIC THERAPY During the first 8 weeks after your pacemaker or ICD insertion, you may need antibiotics before any dental work or certain tests or operations. Let the dentist or doctor who is caring for you know that you have had an implanted device. Discharge Orders None ACO Transitions of Care Introducing [facility] [address]un offers a voluntary care coordination program to provide high quality service and care to [facility] fee-for-service beneficiaries. Susan Beck was designed to help you enhance your health and well-being through the following services: ? Transitions of Care  support for individuals who are transitioning from one care setting to another (example: Hospital to home). ? Chronic and Complex Care Coordination  support for individuals and caregivers of those with serious or chronic illnesses or with more than one chronic (ongoing) condition and those who take a number of different medications. If you meet specific medical criteria, a [address] [place] [address] Rd may call you directly to coordinate your care with your primary care physician and your other care providers. For questions about the [facility] programs, please, contact your physicians office. For general questions or additional information about Accountable Care Organizations: 
Please visit www.medicare.gov/acos. html or call 1-800-MEDICARE (5-575.726.7357) TTY users should call 2-852.937.3652. Introducing [facility] & HEALTH SERVICES! [employer] introduces [employer] patient portal. Now you can access parts of your medical record, email your doctor's office, and request medication refills online. 1. In your internet browser, go to [email or web address] 2. Click on the First Time User? Click Here link in the Sign In box. You will see the New Member Sign Up page. 3. Enter your [employer] Access Code exactly as it appears below. You will not need to use this code after youve completed the sign-up process.  If you do not sign up before the expiration date, you must request a new code. · [employer] Access Code: [email or web address] Expires: 7/23/2017 10:17 AM 
 
4. Enter the last four digits of your Social Security Number (xxxx) and Date of Birth (mm/dd/yyyy) as indicated and click Submit. You will be taken to the next sign-up page. 5. Create a [employer] ID. This will be your [employer] login ID and cannot be changed, so think of one that is secure and easy to remember. 6. Create a [employer] password. You can change your password at any time. 7. Enter your Password Reset Question and Answer. This can be used at a later time if you forget your password. 8. Enter your e-mail address. You will receive e-mail notification when new information is available in [address] 9. Click Sign Up. You can now view and download portions of your medical record. 10. Click the Download Summary menu link to download a portable copy of your medical information. If you have questions, please visit the Frequently Asked Questions section of the [employer] website. Remember, [employer] is NOT to be used for urgent needs. For medical emergencies, dial 911. Now available from your iPhone and Android! General Information Please provide this summary of care documentation to your next provider. Patient Signature:  ____________________________________________________________ Date:  ____________________________________________________________  
  
Kennedy Search Provider Signature:  ____________________________________________________________ Date:  ____________________________________________________________

## 2017-05-09 NOTE — PROGRESS NOTES
Verified patient with two patient identifiers. Medications reviewed/approved by Dr. Fern Lynch. Verbal from Dr. Fern Lynch to remove the medications that were deleted during the visit. (Spoke with nurse Len Villanueva at [place]. Gave her verbal report regarding the pt - son will be transferring the pt to [facility] ER.   [facility]

## 2017-05-09 NOTE — PROGRESS NOTES
Karoline Romberg is a 80 y.o. female is here for hospital  F/u/new patient evaluation. 80 y.o. female admitted to [facility] 4/19-4/24 with dyspnea, cough. Hx HTN, Asthma (lifelong), PAD and chronic pain presents with progressively worsening dyspnea, associated orthopnea of 3 pillows equivalent ,PND and cough--mostly non-productive . She saw her PCP on 4/14/17 For intractable 2 weeks of cough and shortness of breath and started on doxycycline, but She says that since then she's \"gone downhill,\" with poor appetite, increased GUZMÁN and increased pain in knees with limited mobility and depend on her cane. She says her chronic cough is really unchanged much and nonproductive. She also says that most of her breathing problem is caused by nasal and sinus congestion, not her lungs. No fever or chills. No chest pain. No prior known cardiac hx other than hypertension/hypertensive CVD. CXR with basilar changes. BNP elevated, trop with sl rise on serial. EKG with sinus/sinus darshan, first degree and intermittent second degree AV block, no acute ischemic changes. Rate slowing BP meds/beta blockers held, HR improved 70's. Echo with normal LV function, mod AS, mod MS. D/c'd to [facility], sx of GUZMÁN, fatigue, dizziness. To office today with BP 90/, HR 36 (no meds this am and not on anyt rate slowing meds--high grade/complete heart block). The patient denies chest pain, orthopnea, PND, LE edema, palpitations, syncope.        Patient Active Problem List    Diagnosis Date Noted    Sick sinus syndrome ([address] 05/09/2017    Complete heart block ([address] 05/09/2017    Mitral stenosis 04/21/2017    Pulmonary hypertension ([address] 04/21/2017    Aortic stenosis, moderate 04/21/2017    Iron deficiency anemia 04/20/2017    Osteoporosis 04/20/2017    Acute diastolic CHF (congestive heart failure) ([address] 04/19/2017    Bradyarrhythmia 04/19/2017    Bronchitis 04/19/2017    Advance care planning 01/17/2017    Neuropathy 10/11/2016    Gastroesophageal reflux disease 10/11/2016    Osteoarthritis 09/10/2015    [facility]     Anxiety     Elevated lipids     Asthma     Systolic hypertension, isolated     Chronic pain       Logan Mckeon MD  Past Medical History:   Diagnosis Date    Anxiety     Asthma     Cancer ([address]     breast    Chronic pain     Elevated lipids     GERD (gastroesophageal reflux disease)     Heart murmur     Hypercholesterolemia     Hypertension     Leg cramps     Osteoporosis     PAD [facility]peripheral artery disease) [facility]       Past Surgical History:   Procedure Laterality Date    BREAST SURGERY PROCEDURE UNLISTED      HX CATARACT REMOVAL      HX COLONOSCOPY  06/07/2006     Allergies   Allergen Reactions    Amoxicillin Unknown (comments)    Biaxin [Clarithromycin] Unknown (comments)    Celebrex [Celecoxib] Other (comments)     Blood in urine    Indocin [Indomethacin] Unknown (comments)    Zithromax [Azithromycin] Unknown (comments)     Patient said she is allergic all Mycins      Family History   Problem Relation Age of Onset    Cancer Brother      kidney CA      Social History     Social History    Marital status: UNKNOWN     Spouse name: N/A    Number of children: 1    Years of education: N/A     Occupational History    Not on file. Social History Main Topics    Smoking status: Never Smoker    Smokeless tobacco: Never Used    Alcohol use No    Drug use: No    Sexual activity: Not Currently     Partners: Male     Other Topics Concern    Not on file     Social History Narrative    Lives alone, her son lives at [place] and visit/help her once in a week,      Current Outpatient Prescriptions   Medication Sig    beclomethasone (QVAR) 40 mcg/actuation aero Take 2 Puffs by inhalation two (2) times a day.  acetaminophen (TYLENOL) 325 mg tablet Take 2 Tabs by mouth every four (4) hours as needed.     albuterol (PROVENTIL VENTOLIN) 2.5 mg /3 mL (0.083 %) nebulizer solution 3 mL by Nebulization route every four (4) hours as needed for Wheezing.  ALPRAZolam (XANAX) 0.25 mg tablet Take 1 Tab by mouth three (3) times daily as needed for Anxiety. Max Daily Amount: 0.75 mg.  aspirin 81 mg chewable tablet Take 1 Tab by mouth daily.  bumetanide (BUMEX) 1 mg tablet Take 1 Tab by mouth daily.  docusate sodium (COLACE) 100 mg capsule Take 1 Cap by mouth two (2) times daily as needed for Constipation for up to 90 days.  loratadine (CLARITIN) 10 mg tablet Take 1 Tab by mouth daily.  magnesium hydroxide (MILK OF MAGNESIA) 400 mg/5 mL suspension Take 30 mL by mouth daily as needed for Constipation.  polyethylene glycol (MIRALAX) 17 gram packet Take 1 Packet by mouth daily as needed.  senna (SENOKOT) 8.6 mg tablet Take 1 Tab by mouth daily.  spironolactone (ALDACTONE) 25 mg tablet Take 1 Tab by mouth daily.  sucralfate (CARAFATE) 100 mg/mL suspension Take 10 mL by mouth Before breakfast, lunch, and dinner.  ferrous sulfate (IRON) 325 mg (65 mg iron) EC tablet Take one twice a day    naloxone 4 mg/actuation spry 1 Spray by Nasal route as needed.  HYDROcodone-acetaminophen (NORCO) 5-325 mg per tablet Take 1 Tab by mouth every eight (8) hours as needed. Max Daily Amount: 3 Tabs.  cilostazol (PLETAL) 100 mg tablet One bid    hydroCHLOROthiazide (HYDRODIURIL) 25 mg tablet TAKE 1 TABLET BY MOUTH EVERY DAY    gabapentin (NEURONTIN) 100 mg capsule TAKE ONE CAPSULE BY MOUTH AT BEDTIME FOR NEUROPATHIC PAIN    valsartan (DIOVAN) 320 mg tablet TAKE 1 TABLET BY MOUTH EVERY DAY FOR BLOOD PRESSURE    gemfibrozil (LOPID) 600 mg tablet TAKE 1 TABLET BY MOUTH TWICE DAILY FOR CHOLESTROL    omeprazole (PRILOSEC) 20 mg capsule TAKE ONE CAPSULE BY MOUTH DAILY FOR STOMACH    citalopram (CELEXA) 40 mg tablet TAKE 1 TABLET BY MOUTH AT BEDTIME FOR DEPRESSION    Calcium Carbonate-Vit D3-Min 600 mg calcium- 400 unit tab Take  by mouth two (2) times a day.     guaiFENesin (MUCUS RELIEF) 400 mg tablet Take  by mouth two (2) times a day.  albuterol (VENTOLIN HFA) 90 mcg/actuation inhaler SHAKE WELL. INHALE 2 PUFFS (1 MINUTE APART) EVERY 4 HOURS AS NEEDED FOR COUGH OR WHEEZE    ascorbic acid (VITAMIN C) 500 mg tablet Take  by mouth.  simethicone (GAS-X) 125 mg capsule Take 125 mg by mouth four (4) times daily as needed for Flatulence.  krill-omega-3-dha-epa-lipids (KRILL OIL) 473-02-00-26 mg cap Take  by mouth. No current facility-administered medications for this visit. Review of Symptoms:    CONST  No weight change. No fever, chills, sweats    ENT No visual changes, URI sx, sore throat    CV  See HPI   RESP  No cough, or sputum, wheezing. Also see HPI   GI  No abdominal pain or change in bowel habits. No heartburn or dysphagia. No melena or rectal bleeding.   No dysuria, urgency, frequency, hematuria   MSKEL  No joint pain, swelling. No muscle pain. SKIN  No rash or lesions. NEURO  No headache, syncope, or seizure. No weakness, loss of sensation, or paresthesias. PSYCH  No low mood or depression  No anxiety. HE/LYMPH  No easy bruising, abnormal bleeding, or enlarged glands.         Physical ExamPhysical Exam:    Visit Vitals    BP 90/42 (BP 1 Location: Right arm, BP Patient Position: Sitting)    Pulse (!) 36    Resp 20    Ht 5' 8\" (1.727 m)    Wt 163 lb (73.9 kg)    BMI 24.78 kg/m2     Gen: NAD awake alert  HEENT:  PERRL, throat clear  Neck: no mass or thyromegaly, no JVD   Heart:  Regular,Nl S1S2,  II/VI murmur, gallop or rub.   Lungs:  clear  Abdomen:   Soft, non-tender, bowel sounds are active.   Extremities:  No edema  Pulse: symmetric  Neuro: A&O times 3, WNL      Cardiographics    ECG: complete heart block, RBBB, HR 36      Labs:   Lab Results   Component Value Date/Time    Sodium 138 04/24/2017 08:15 AM    Sodium 139 04/23/2017 08:15 AM    Sodium 140 04/21/2017 08:15 AM    Sodium 139 04/19/2017 04:00 PM    Sodium 138 04/14/2017 08:41 AM    Potassium 3.9 04/24/2017 08:15 AM    Potassium 4.5 04/23/2017 08:15 AM    Potassium 4.4 04/21/2017 08:15 AM    Potassium 4.4 04/19/2017 04:00 PM    Potassium 4.8 04/14/2017 08:41 AM    Chloride 99 04/24/2017 08:15 AM    Chloride 101 04/23/2017 08:15 AM    Chloride 101 04/21/2017 08:15 AM    Chloride 101 04/19/2017 04:00 PM    Chloride 97 04/14/2017 08:41 AM    CO2 33 04/24/2017 08:15 AM    CO2 29 04/23/2017 08:15 AM    CO2 24 04/21/2017 08:15 AM    CO2 25 04/19/2017 04:00 PM    CO2 24 04/14/2017 08:41 AM    Anion gap 6 04/24/2017 08:15 AM    Anion gap 9 04/23/2017 08:15 AM    Anion gap 15 04/21/2017 08:15 AM    Anion gap 13 04/19/2017 04:00 PM    Glucose 128 04/24/2017 08:15 AM    Glucose 190 04/23/2017 08:15 AM    Glucose 143 04/21/2017 08:15 AM    Glucose 109 04/19/2017 04:00 PM    Glucose 91 04/14/2017 08:41 AM    BUN 35 04/24/2017 08:15 AM    BUN 37 04/23/2017 08:15 AM    BUN 43 04/21/2017 08:15 AM    BUN 40 04/19/2017 04:00 PM    BUN 25 04/14/2017 08:41 AM    Creatinine 1.13 04/24/2017 08:15 AM    Creatinine 1.14 04/23/2017 08:15 AM    Creatinine 1.32 04/21/2017 08:15 AM    Creatinine 1.15 04/19/2017 04:00 PM    Creatinine 1.15 04/14/2017 08:41 AM    BUN/Creatinine ratio 31 04/24/2017 08:15 AM    BUN/Creatinine ratio 32 04/23/2017 08:15 AM    BUN/Creatinine ratio 33 04/21/2017 08:15 AM    BUN/Creatinine ratio 35 04/19/2017 04:00 PM    BUN/Creatinine ratio 22 04/14/2017 08:41 AM    GFR est AA 55 04/24/2017 08:15 AM    GFR est AA 54 04/23/2017 08:15 AM    GFR est AA 46 04/21/2017 08:15 AM    GFR est AA 53 04/19/2017 04:00 PM    GFR est AA 48 04/14/2017 08:41 AM    GFR est non-AA 45 04/24/2017 08:15 AM    GFR est non-AA 45 04/23/2017 08:15 AM    GFR est non-AA 38 04/21/2017 08:15 AM    GFR est non-AA 44 04/19/2017 04:00 PM    GFR est non-AA 41 04/14/2017 08:41 AM    Calcium 9.1 04/24/2017 08:15 AM    Calcium 8.7 04/23/2017 08:15 AM    Calcium 8.9 04/21/2017 08:15 AM    Calcium 9.0 04/19/2017 04:00 PM    Calcium 9.6 04/14/2017 08:41 AM    Bilirubin, total 0.7 04/19/2017 04:00 PM    Bilirubin, total 0.5 04/14/2017 08:41 AM    Bilirubin, total 0.3 07/21/2016 08:54 AM    Bilirubin, total 0.2 02/18/2016 10:21 AM    Bilirubin, total 0.3 09/10/2015 09:54 AM    AST (SGOT) 54 04/19/2017 04:00 PM    AST (SGOT) 12 04/14/2017 08:41 AM    AST (SGOT) 11 07/21/2016 08:54 AM    AST (SGOT) 13 02/18/2016 10:21 AM    AST (SGOT) 10 09/10/2015 09:54 AM    Alk. phosphatase 100 04/19/2017 04:00 PM    Alk. phosphatase 47 04/14/2017 08:41 AM    Alk. phosphatase 50 07/21/2016 08:54 AM    Alk. phosphatase 62 02/18/2016 10:21 AM    Alk.  phosphatase 73 09/10/2015 09:54 AM    Protein, total 6.7 04/19/2017 04:00 PM    Protein, total 6.3 04/14/2017 08:41 AM    Protein, total 6.4 07/21/2016 08:54 AM    Protein, total 6.9 02/18/2016 10:21 AM    Protein, total 6.4 09/10/2015 09:54 AM    Albumin 3.4 04/19/2017 04:00 PM    Albumin 4.2 04/14/2017 08:41 AM    Albumin 4.1 07/21/2016 08:54 AM    Albumin 4.5 02/18/2016 10:21 AM    Albumin 4.1 09/10/2015 09:54 AM    Globulin 3.3 04/19/2017 04:00 PM    A-G Ratio 1.0 04/19/2017 04:00 PM    A-G Ratio 2.0 04/14/2017 08:41 AM    A-G Ratio 1.8 07/21/2016 08:54 AM    A-G Ratio 1.9 02/18/2016 10:21 AM    A-G Ratio 1.8 09/10/2015 09:54 AM    ALT (SGPT) 67 04/19/2017 04:00 PM    ALT (SGPT) 8 04/14/2017 08:41 AM    ALT (SGPT) 7 07/21/2016 08:54 AM    ALT (SGPT) 7 02/18/2016 10:21 AM    ALT (SGPT) 9 09/10/2015 09:54 AM     Lab Results   Component Value Date/Time     04/19/2017 04:00 PM     Lab Results   Component Value Date/Time    Cholesterol, total 153 04/14/2017 08:41 AM    Cholesterol, total 169 07/21/2016 08:54 AM    Cholesterol, total 182 02/18/2016 10:21 AM    Cholesterol, total 182 09/10/2015 09:54 AM    Cholesterol, total 165 03/12/2015 10:22 AM    HDL Cholesterol 77 04/14/2017 08:41 AM    HDL Cholesterol 74 07/21/2016 08:54 AM    HDL Cholesterol 84 02/18/2016 10:21 AM    HDL Cholesterol 81 09/10/2015 09:54 AM    HDL Cholesterol 64 03/12/2015 10:22 AM    LDL, calculated 59 04/14/2017 08:41 AM    LDL, calculated 80 07/21/2016 08:54 AM    LDL, calculated 83 02/18/2016 10:21 AM    LDL, calculated 86 09/10/2015 09:54 AM    LDL, calculated 76 03/12/2015 10:22 AM    Triglyceride 87 04/14/2017 08:41 AM    Triglyceride 75 07/21/2016 08:54 AM    Triglyceride 77 02/18/2016 10:21 AM    Triglyceride 76 09/10/2015 09:54 AM    Triglyceride 126 03/12/2015 10:22 AM     No results found for this or any previous visit. Assessment:         Patient Active Problem List    Diagnosis Date Noted    Sick sinus syndrome ([facility] 05/09/2017    Complete heart block ([facility] 05/09/2017    Mitral stenosis 04/21/2017    Pulmonary hypertension ([facility] 04/21/2017    Aortic stenosis, moderate 04/21/2017    Iron deficiency anemia 04/20/2017    Osteoporosis 04/20/2017    Acute diastolic CHF (congestive heart failure) ([facility] 04/19/2017    Bradyarrhythmia 04/19/2017    Bronchitis 04/19/2017    Advance care planning 01/17/2017    Neuropathy 10/11/2016    Gastroesophageal reflux disease 10/11/2016    Osteoarthritis 09/10/2015    PAD (peripheral artery disease) ([facility])     Anxiety     Elevated lipids     Asthma     Systolic hypertension, isolated     Chronic pain       80 y.o. female admitted to [facility] 4/19-4/24 with dyspnea, cough. Hx HTN, Asthma (lifelong), PAD and chronic pain presents with progressively worsening dyspnea, associated orthopnea of 3 pillows equivalent ,PND and cough--mostly non-productive . She saw her PCP on 4/14/17 For intractable 2 weeks of cough and shortness of breath and started on doxycycline, but She says that since then she's \"gone downhill,\" with poor appetite, increased GUZMÁN and increased pain in knees with limited mobility and depend on her cane. She says her chronic cough is really unchanged much and nonproductive. She also says that most of her breathing problem is caused by nasal and sinus congestion, not her lungs. No fever or chills. No chest pain. No prior known cardiac hx other than hypertension/hypertensive CVD. CXR with basilar changes. BNP elevated, trop with sl rise on serial. EKG with sinus/sinus darshan, first degree and intermittent second degree AV block, no acute ischemic changes. Rate slowing BP meds/beta blockers held, HR improved 70's. Echo with normal LV function, mod AS, mod MS, pulmonary hypertension. D/c'd to [facility], sx of GUZMÁN, fatigue, dizziness. To office today with BP 90/, HR 36 (no meds this am and not on anyt rate slowing meds--high grade/complete heart block). Complete Heart Block  Diastolic CHF  Mod AS/mod MR  Hypertension/hypertensive CVD  Pulm hypertension     Plan: To [facility] ER for ambulance transfer to [facility] for pacemaker.   Will notify [facility], ER, and arrange EP consult (Dr. Derek Ayala to accept from ER in transfer (discussed with Dr. Yi Hammer)    Kelly Alvarado MD

## 2017-05-09 NOTE — ED TRIAGE NOTES
Triage note: Patient arrives via ALS from [address] [place] [address] secondary to complete heart block.  Patient alert and oriented x4, HR 45, denies any complaints upon arrival.     Visit Vitals    /48 (BP 1 Location: Left arm, BP Patient Position: At rest;Head of bed elevated (Comment degrees))    Pulse (!) 45    Temp 97.4 °F (36.3 °C)    Resp 16    SpO2 96%

## 2017-05-09 NOTE — PROCEDURES
[address] [address]  [phone]    Indications and Pre-Procedure Diagnosis: Wilian Larios is a 80 y.o. female with complete heart block is referred for dual chamber pacemaker. Post Procedure Diagnosis:    Complete heart block    Pacemaker Implant Procedure and Findings:  Informed consent was obtained and the patient was premedicated with vancomycin. The procedure was performed under local anesthesia. Continuous pulse oximetry and cuff pressure were monitored. During the procedure, the patient received Versed and Fentanyl for sedation per nursing personnel. The left deltopectoral area was prepped and draped in the usual sterile fashion and was liberally infiltrated with 1% lidocaine. An incision was made over the left subpectoral area and a generator pocket was manually dissected. Access was achieved in the left axillary vein under fluoroscopic guidance and using the seldinger technique. Through the left axillary vein, pacing leads were positioned in appropriate regions in the right heart chambers where satisfactory pacing and sensing parameters were measured. Stability of the leads was assessed with deep breathing and there was no diaphragmatic pacing at 10V output. The leads were anchored using the sleeves and a pulse generator pocket fashioned using blunt dissection. The leads were then connected to the pulse generator. The pulse generator pocket was then liberally infiltrated with bacitracin solution, and the device implanted with a single silk fixation suture in the header to prevent migration. The wound was closed in layers using continuous 2-0 Vicryl and 4-0 Vicryl ending with a sub-cuticular closure. Fluoroscopy and total procedure times were 1 and 30 minutes respectively. Estimated blood loss <10 ml. Sharp count: correct. Specimen(s) collected: none. The following procedure related complication occurred: none. The following problems were encountered: none. Findings: successful pacemaker placement. Device Data Measurements:  Lead Sensing (mV) Threshold (V)Pulse Width (ms) Impedance (Ohms)  RA 2.3  0.7  0.5   519  RV paced  0.9  0.5   723      Final Programmed Parameters  Bradycardia pacing rate  60 bpm  Pacing Mode    DDD  Pacing Output    3.5 V@ 0.5 ms    Supplies Summary available in the chart  [employer]    Thank you for allowing me to participate in this patients care.     Franklin Tello MD, Leroy Car

## 2017-05-09 NOTE — INTERVAL H&P NOTE
H&P Update: Jossy Ni was seen and examined. History and physical has been reviewed. Significant clinical changes have occurred as noted:  Pt remains in chb. Will admit for pacemaker. I discussed the risks/benefits/alternatives of the procedure with the patient. Risks include (but are not limited to) bleeding, heart block, infection, cva/mi/tamponade/death. The patient understands and agrees to proceed. Thank you for this interesting consultation.       Signed By: Gretchen Hayes MD     May 9, 2017 3:41 PM

## 2017-05-09 NOTE — ED NOTES
Jonas Gonzalez, patients son, [phone], called as requested by patient, updated on status, aware of pacemaker placement today, verbalizes understanding, opportunity for questions provided.

## 2017-05-09 NOTE — ED PROVIDER NOTES
HPI Comments: Buckner Bernheim is a 80 y.o. female with PMHx significant for GERD,anxiety,HTN who presents as a transfer from [facility] to the ED for pacemaker insertion and management of CHB with associated bradycardia. Pt was originally referred to the ER by Nitish Black of cardiology for a 3rd degree HB. Pt was recently admitted on 4/19-4/24 for CHF, and during that admission had a transient CHB that resolved when betablocker was held (per chart). Chart also shows that  transferred pt for a pacemaker insertion. Pt was at a f/u appt with cardiology today when the HB was noted and pt referred to the ER. She specifically denies any fevers, chills, nausea, vomiting, chest pain, shortness of breath, headache, rash, diarrhea, sweating or weight loss. PCP: Karla Finnegan MD    Social hx: smoking (-) EtOH (-)    There are no other complaints, changes, or physical findings at this time. The history is provided by the patient (+ nursing staff). Past Medical History:   Diagnosis Date    Anxiety     Asthma     Cancer ([address]     breast    Chronic pain     Elevated lipids     GERD (gastroesophageal reflux disease)     Heart murmur     Hypercholesterolemia     Hypertension     Leg cramps     Osteoporosis     PAD (peripheral artery disease) (HCC)        Past Surgical History:   Procedure Laterality Date    BREAST SURGERY PROCEDURE UNLISTED      HX CATARACT REMOVAL      HX COLONOSCOPY  06/07/2006         Family History:   Problem Relation Age of Onset    Cancer Brother      kidney CA       Social History     Social History    Marital status: UNKNOWN     Spouse name: N/A    Number of children: 1    Years of education: N/A     Occupational History    Not on file.      Social History Main Topics    Smoking status: Never Smoker    Smokeless tobacco: Never Used    Alcohol use No    Drug use: No    Sexual activity: Not Currently     Partners: Male     Other Topics Concern    Not on file     Social History Narrative    Lives alone, her son lives at [place] and visit/help her once in a week,         ALLERGIES: Amoxicillin; Biaxin [clarithromycin]; Celebrex [celecoxib]; Indocin [indomethacin]; and Zithromax [azithromycin]    Review of Systems   Constitutional: Negative for appetite change, chills and fever. HENT: Negative for congestion. Eyes: Negative for visual disturbance. Respiratory: Negative for cough, shortness of breath and wheezing. Cardiovascular: Negative for chest pain, palpitations and leg swelling. Gastrointestinal: Negative for abdominal pain. Genitourinary: Negative for dysuria, frequency and urgency. Musculoskeletal: Negative for back pain, joint swelling, myalgias and neck stiffness. Skin: Negative for rash. Neurological: Negative for dizziness, syncope, weakness and headaches. Hematological: Negative for adenopathy. Psychiatric/Behavioral: Negative for behavioral problems and dysphoric mood.        Vitals:    05/09/17 2000 05/09/17 2100 05/09/17 2106 05/09/17 2107   BP: 126/56 (!) 73/40 (!) 83/48 (!) 83/48   Pulse: 85 71 83 70   Resp:       Temp:       SpO2: 100% 96% 97% 99%            Physical Exam   Physical Examination: General appearance - WDWN, in no apparent distress  Head - NC/AT  Eyes - pupils equal, round  and reactive, extraocular eye movements intact, conj/sclera clear, anicteric  Mouth - mucous membranes moist, pharynx normal without lesions  Nose/Ears - nares clear, Tms & canals clear  Neck - supple, no significant adenopathy, trachea midline, no crepitus, c spine diffusely non-tender, no step offs  Chest - Normal respiratory effort, clear to auscultation bilaterally, no wheezes/rales/rhonchi  Heart -  bradycardic, S1 and S2 normal, no murmurs, gallops, or rubs  Abdomen - soft, nontender, nondistended, nabs, no masses, guarding, rebound or rigidity  Neurological - alert, oriented, normal speech, cranial nerves intact, no focal motor findings, motor & sensory diffusely intact  Extremities/MS - peripheral pulses normal, no pedal edema, all joints atraumatic, FROM, non-tender, no gross deformities, spine diffusely non-tender  Skin - normal coloration and turgor, no rashes, no lesions or lacerations        MDM  Number of Diagnoses or Management Options  Diagnosis management comments: DDx: heart block, CHF, electrolyte abnormality       Amount and/or Complexity of Data Reviewed  Clinical lab tests: ordered and reviewed  Tests in the radiology section of CPT®: reviewed and ordered  Tests in the medicine section of CPT®: ordered and reviewed  Review and summarize past medical records: yes  Independent visualization of images, tracings, or specimens: yes    Patient Progress  Patient progress: stable    ED Course       Procedures     EKG interpretation: (Preliminary) 1513  Rhythm: sinus rhythm with AV dissociation ; and regular . Rate (approx.): 45; Axis: normal; SD interval: normal; QRS interval: wide QRS; ST/T wave: normal;Other findings: complete heart block.       Progress Note:  2:55 PM  Cardiology called, state that they are aware of the pt  Written by Luann Hays ED Scribe      ADMIT NOTE:  LABORATORY TESTS:  Recent Results (from the past 12 hour(s))   EKG, 12 LEAD, INITIAL    Collection Time: 05/09/17  9:38 AM   Result Value Ref Range    Ventricular Rate 41 BPM    Atrial Rate 47 BPM    QRS Duration 134 ms    Q-T Interval 576 ms    QTC Calculation (Bezet) 475 ms    Calculated P Axis 33 degrees    Calculated R Axis 102 degrees    Calculated T Axis 8 degrees    Diagnosis       Sinus bradycardia with AV dissociation and Wide QRS rhythm  Right bundle branch block  T wave abnormality, consider inferior ischemia  Abnormal ECG  When compared with ECG of 19-APR-2017 23:09,  Wide QRS rhythm has replaced Sinus rhythm     EKG, 12 LEAD, INITIAL    Collection Time: 05/09/17  3:13 PM   Result Value Ref Range    Ventricular Rate 45 BPM    Atrial Rate 100 BPM QRS Duration 128 ms    Q-T Interval 506 ms    QTC Calculation (Bezet) 437 ms    Calculated P Axis 42 degrees    Calculated R Axis 53 degrees    Calculated T Axis 41 degrees    Diagnosis       Sinus rhythm with AV dissociation and Wide QRS rhythm  Right bundle branch block  When compared with ECG of 09-MAY-2017 09:38,  MANUAL COMPARISON REQUIRED, DATA IS UNCONFIRMED     URINALYSIS W/ REFLEX CULTURE    Collection Time: 05/09/17  3:19 PM   Result Value Ref Range    Color YELLOW/STRAW      Appearance TURBID (A) CLEAR      Specific gravity 1.016 1.003 - 1.030      pH (UA) 5.5 5.0 - 8.0      Protein 30 (A) NEG mg/dL    Glucose NEGATIVE  NEG mg/dL    Ketone NEGATIVE  NEG mg/dL    Bilirubin NEGATIVE  NEG      Blood MODERATE (A) NEG      Urobilinogen 0.2 0.2 - 1.0 EU/dL    Nitrites NEGATIVE  NEG      Leukocyte Esterase LARGE (A) NEG      WBC >100 (H) 0 - 4 /hpf    RBC 20-50 0 - 5 /hpf    Epithelial cells MODERATE (A) FEW /lpf    Bacteria 4+ (A) NEG /hpf    UA:UC IF INDICATED URINE CULTURE ORDERED (A) CNI      CA Oxalate crystals FEW (A) NEG     CBC WITH AUTOMATED DIFF    Collection Time: 05/09/17  3:29 PM   Result Value Ref Range    WBC 4.9 3.6 - 11.0 K/uL    RBC 3.75 (L) 3.80 - 5.20 M/uL    HGB 8.3 (L) 11.5 - 16.0 g/dL    HCT 27.3 (L) 35.0 - 47.0 %    MCV 72.8 (L) 80.0 - 99.0 FL    MCH 22.1 (L) 26.0 - 34.0 PG    MCHC 30.4 30.0 - 36.5 g/dL    RDW 20.7 (H) 11.5 - 14.5 %    PLATELET 638 424 - 381 K/uL    NEUTROPHILS 78 (H) 32 - 75 %    LYMPHOCYTES 14 12 - 49 %    MONOCYTES 7 5 - 13 %    EOSINOPHILS 1 0 - 7 %    BASOPHILS 0 0 - 1 %    ABS. NEUTROPHILS 3.8 1.8 - 8.0 K/UL    ABS. LYMPHOCYTES 0.7 (L) 0.8 - 3.5 K/UL    ABS. MONOCYTES 0.3 0.0 - 1.0 K/UL    ABS. EOSINOPHILS 0.1 0.0 - 0.4 K/UL    ABS.  BASOPHILS 0.0 0.0 - 0.1 K/UL    RBC COMMENTS ANISOCYTOSIS  1+        RBC COMMENTS MICROCYTOSIS  1+        RBC COMMENTS HYPOCHROMIA  1+       METABOLIC PANEL, COMPREHENSIVE    Collection Time: 05/09/17  3:29 PM   Result Value Ref Range Sodium 135 (L) 136 - 145 mmol/L    Potassium 6.0 (H) 3.5 - 5.1 mmol/L    Chloride 102 97 - 108 mmol/L    CO2 22 21 - 32 mmol/L    Anion gap 11 5 - 15 mmol/L    Glucose 74 65 - 100 mg/dL    BUN 72 (H) 6 - 20 MG/DL    Creatinine 3.76 (H) 0.55 - 1.02 MG/DL    BUN/Creatinine ratio 19 12 - 20      GFR est AA 14 (L) >60 ml/min/1.73m2    GFR est non-AA 11 (L) >60 ml/min/1.73m2    Calcium 9.1 8.5 - 10.1 MG/DL    Bilirubin, total 0.5 0.2 - 1.0 MG/DL    ALT (SGPT) 10 (L) 12 - 78 U/L    AST (SGOT) 6 (L) 15 - 37 U/L    Alk. phosphatase 71 45 - 117 U/L    Protein, total 5.9 (L) 6.4 - 8.2 g/dL    Albumin 2.8 (L) 3.5 - 5.0 g/dL    Globulin 3.1 2.0 - 4.0 g/dL    A-G Ratio 0.9 (L) 1.1 - 2.2     MAGNESIUM    Collection Time: 05/09/17  3:29 PM   Result Value Ref Range    Magnesium 2.4 1.6 - 2.4 mg/dL   CK W/ CKMB & INDEX    Collection Time: 05/09/17  3:29 PM   Result Value Ref Range    CK 20 (L) 26 - 192 U/L    CK - MB 1.4 <3.6 NG/ML    CK-MB Index 7.0 (H) 0 - 2.5     TROPONIN I    Collection Time: 05/09/17  3:29 PM   Result Value Ref Range    Troponin-I, Qt. <0.04 <0.05 ng/mL   PRO-BNP    Collection Time: 05/09/17  3:29 PM   Result Value Ref Range    NT pro-BNP 4460 (H) 0 - 450 PG/ML   PROTHROMBIN TIME + INR    Collection Time: 05/09/17  3:29 PM   Result Value Ref Range    INR 1.1 0.9 - 1.1      Prothrombin time 10.6 9.0 - 11.1 sec   PTT    Collection Time: 05/09/17  3:29 PM   Result Value Ref Range    aPTT 28.4 22.1 - 32.5 sec    aPTT, therapeutic range     58.0 - 77.0 SECS       IMAGING RESULTS:  XR CHEST PORT   Final Resulthistory: Bradycardia and chest pain     COMPARISON: 4/19/2017     FINDINGS:     Frontal chest radiograph submitted for review.      Stable cardiomediastinal silhouette. Unchanged bibasilar opacities which may  represent atelectasis and/or scarring. Unchanged small effusions. No new lung  abnormality. No pneumothorax.  Stable visualized osseous structures.     IMPRESSION  IMPRESSION:     Stable exam without new lung abnormality           MEDICATIONS GIVEN:  Medications   fentaNYL citrate (PF) injection 25-50 mcg (not administered)   midazolam (VERSED) injection 0.5-2 mg (not administered)   bacitracin injection 50,000 Units (not administered)   heparinized saline 2 units/mL infusion 1,000 Units (not administered)   lidocaine (XYLOCAINE) 10 mg/mL (1 %) injection 1-40 mL (not administered)   vancomycin (VANCOCIN) 1,000 mg in 0.9% sodium chloride (MBP/ADV) 250 mL (not administered)   sodium chloride (NS) flush 5-10 mL (not administered)   sodium chloride (NS) flush 5-10 mL (not administered)   ADDaptor (not administered)   vancomycin (VANCOCIN) 1,000 mg injection (not administered)   0.9% sodium chloride (MBP/ADV) 0.9 % infusion (not administered)   bacitracin 50,000 unit injection (not administered)   fentaNYL citrate (PF) 50 mcg/mL injection (not administered)   midazolam (VERSED) 1 mg/mL injection (not administered)   lidocaine (XYLOCAINE) 10 mg/mL (1 %) injection (not administered)       IMPRESSION:  No diagnosis found. PLAN: Admit to cardiology      ADMISSION NOTE:  4:59 PM  Patient is being admitted to the hospital by Dr. Angela Israel. The results of their tests and reasons for their admission have been discussed with them and available family. They convey agreement and understanding for the need to be admitted and for their admission diagnosis. ATTESTATION:  This note is prepared by Jeb Brown, acting as Scribe for [employer]. Aaron Hylton, [address] [place] [address]. Aaron Hylton MD: The scribe's documentation has been prepared under my direction and personally reviewed by me in its entirety. I confirm that the note above accurately reflects all work, treatment, procedures, and medical decision making performed by me.

## 2017-05-09 NOTE — H&P (VIEW-ONLY)
Odin Celestin is a 80 y.o. female is here for hospital  F/u/new patient evaluation. 80 y.o. female admitted to [facility] 4/19-4/24 with dyspnea, cough. Hx HTN, Asthma (lifelong), PAD and chronic pain presents with progressively worsening dyspnea, associated orthopnea of 3 pillows equivalent ,PND and cough--mostly non-productive . She saw her PCP on 4/14/17 For intractable 2 weeks of cough and shortness of breath and started on doxycycline, but She says that since then she's \"gone downhill,\" with poor appetite, increased GUZMÁN and increased pain in knees with limited mobility and depend on her cane. She says her chronic cough is really unchanged much and nonproductive. She also says that most of her breathing problem is caused by nasal and sinus congestion, not her lungs. No fever or chills. No chest pain. No prior known cardiac hx other than hypertension/hypertensive CVD. CXR with basilar changes. BNP elevated, trop with sl rise on serial. EKG with sinus/sinus darshan, first degree and intermittent second degree AV block, no acute ischemic changes. Rate slowing BP meds/beta blockers held, HR improved 70's. Echo with normal LV function, mod AS, mod MS. D/c'd to [facility], sx of GUZMÁN, fatigue, dizziness. To office today with BP 90/, HR 36 (no meds this am and not on anyt rate slowing meds--high grade/complete heart block). The patient denies chest pain, orthopnea, PND, LE edema, palpitations, syncope.        Patient Active Problem List    Diagnosis Date Noted    Sick sinus syndrome ([address] 05/09/2017    Complete heart block ([address] 05/09/2017    Mitral stenosis 04/21/2017    Pulmonary hypertension ([address] 04/21/2017    Aortic stenosis, moderate 04/21/2017    Iron deficiency anemia 04/20/2017    Osteoporosis 04/20/2017    Acute diastolic CHF (congestive heart failure) ([address] 04/19/2017    Bradyarrhythmia 04/19/2017    Bronchitis 04/19/2017    Advance care planning 01/17/2017    Neuropathy 10/11/2016    Gastroesophageal reflux disease 10/11/2016    Osteoarthritis 09/10/2015    PAD (peripheral artery disease) (HCC)     Anxiety     Elevated lipids     Asthma     Systolic hypertension, isolated     Chronic pain       Edmar Davies MD  Past Medical History:   Diagnosis Date    Anxiety     Asthma     [facility] ([facility]     breast    Chronic pain     Elevated lipids     GERD (gastroesophageal reflux disease)     Heart murmur     Hypercholesterolemia     Hypertension     Leg cramps     Osteoporosis     PAD (peripheral artery disease) (HCC)       Past Surgical History:   Procedure Laterality Date    BREAST SURGERY PROCEDURE UNLISTED      HX CATARACT REMOVAL      HX COLONOSCOPY  06/07/2006     Allergies   Allergen Reactions    Amoxicillin Unknown (comments)    Biaxin [Clarithromycin] Unknown (comments)    Celebrex [Celecoxib] Other (comments)     Blood in urine    Indocin [Indomethacin] Unknown (comments)    Zithromax [Azithromycin] Unknown (comments)     Patient said she is allergic all Mycins      Family History   Problem Relation Age of Onset    Cancer Brother      kidney CA      Social History     Social History    Marital status: UNKNOWN     Spouse name: N/A    Number of children: 1    Years of education: N/A     Occupational History    Not on file. Social History Main Topics    Smoking status: Never Smoker    Smokeless tobacco: Never Used    Alcohol use No    Drug use: No    Sexual activity: Not Currently     Partners: Male     Other Topics Concern    Not on file     Social History Narrative    Lives alone, her son lives at [place] and visit/help her once in a week,      Current Outpatient Prescriptions   Medication Sig    beclomethasone (QVAR) 40 mcg/actuation aero Take 2 Puffs by inhalation two (2) times a day.  acetaminophen (TYLENOL) 325 mg tablet Take 2 Tabs by mouth every four (4) hours as needed.     albuterol (PROVENTIL VENTOLIN) 2.5 mg /3 mL (0.083 %) nebulizer solution 3 mL by Nebulization route every four (4) hours as needed for Wheezing.  ALPRAZolam (XANAX) 0.25 mg tablet Take 1 Tab by mouth three (3) times daily as needed for Anxiety. Max Daily Amount: 0.75 mg.  aspirin 81 mg chewable tablet Take 1 Tab by mouth daily.  bumetanide (BUMEX) 1 mg tablet Take 1 Tab by mouth daily.  docusate sodium (COLACE) 100 mg capsule Take 1 Cap by mouth two (2) times daily as needed for Constipation for up to 90 days.  loratadine (CLARITIN) 10 mg tablet Take 1 Tab by mouth daily.  magnesium hydroxide (MILK OF MAGNESIA) 400 mg/5 mL suspension Take 30 mL by mouth daily as needed for Constipation.  polyethylene glycol (MIRALAX) 17 gram packet Take 1 Packet by mouth daily as needed.  senna (SENOKOT) 8.6 mg tablet Take 1 Tab by mouth daily.  spironolactone (ALDACTONE) 25 mg tablet Take 1 Tab by mouth daily.  sucralfate (CARAFATE) 100 mg/mL suspension Take 10 mL by mouth Before breakfast, lunch, and dinner.  ferrous sulfate (IRON) 325 mg (65 mg iron) EC tablet Take one twice a day    naloxone 4 mg/actuation spry 1 Spray by Nasal route as needed.  HYDROcodone-acetaminophen (NORCO) 5-325 mg per tablet Take 1 Tab by mouth every eight (8) hours as needed. Max Daily Amount: 3 Tabs.  cilostazol (PLETAL) 100 mg tablet One bid    hydroCHLOROthiazide (HYDRODIURIL) 25 mg tablet TAKE 1 TABLET BY MOUTH EVERY DAY    gabapentin (NEURONTIN) 100 mg capsule TAKE ONE CAPSULE BY MOUTH AT BEDTIME FOR NEUROPATHIC PAIN    valsartan (DIOVAN) 320 mg tablet TAKE 1 TABLET BY MOUTH EVERY DAY FOR BLOOD PRESSURE    gemfibrozil (LOPID) 600 mg tablet TAKE 1 TABLET BY MOUTH TWICE DAILY FOR CHOLESTROL    omeprazole (PRILOSEC) 20 mg capsule TAKE ONE CAPSULE BY MOUTH DAILY FOR STOMACH    citalopram (CELEXA) 40 mg tablet TAKE 1 TABLET BY MOUTH AT BEDTIME FOR DEPRESSION    Calcium Carbonate-Vit D3-Min 600 mg calcium- 400 unit tab Take  by mouth two (2) times a day.     guaiFENesin (MUCUS RELIEF) 400 mg tablet Take  by mouth two (2) times a day.  albuterol (VENTOLIN HFA) 90 mcg/actuation inhaler SHAKE WELL. INHALE 2 PUFFS (1 MINUTE APART) EVERY 4 HOURS AS NEEDED FOR COUGH OR WHEEZE    ascorbic acid (VITAMIN C) 500 mg tablet Take  by mouth.  simethicone (GAS-X) 125 mg capsule Take 125 mg by mouth four (4) times daily as needed for Flatulence.  krill-omega-3-dha-epa-lipids (KRILL OIL) 040-00-00-24 mg cap Take  by mouth. No current facility-administered medications for this visit. Review of Symptoms:    CONST  No weight change. No fever, chills, sweats    ENT No visual changes, URI sx, sore throat    CV  See HPI   RESP  No cough, or sputum, wheezing. Also see HPI   GI  No abdominal pain or change in bowel habits. No heartburn or dysphagia. No melena or rectal bleeding.   No dysuria, urgency, frequency, hematuria   MSKEL  No joint pain, swelling. No muscle pain. SKIN  No rash or lesions. NEURO  No headache, syncope, or seizure. No weakness, loss of sensation, or paresthesias. PSYCH  No low mood or depression  No anxiety. HE/LYMPH  No easy bruising, abnormal bleeding, or enlarged glands.         Physical ExamPhysical Exam:    Visit Vitals    BP 90/42 (BP 1 Location: Right arm, BP Patient Position: Sitting)    Pulse (!) 36    Resp 20    Ht 5' 8\" (1.727 m)    Wt 163 lb (73.9 kg)    BMI 24.78 kg/m2     Gen: NAD awake alert  HEENT:  PERRL, throat clear  Neck: no mass or thyromegaly, no JVD   Heart:  Regular,Nl S1S2,  II/VI murmur, gallop or rub.   Lungs:  clear  Abdomen:   Soft, non-tender, bowel sounds are active.   Extremities:  No edema  Pulse: symmetric  Neuro: A&O times 3, WNL      Cardiographics    ECG: complete heart block, RBBB, HR 36      Labs:   Lab Results   Component Value Date/Time    Sodium 138 04/24/2017 08:15 AM    Sodium 139 04/23/2017 08:15 AM    Sodium 140 04/21/2017 08:15 AM    Sodium 139 04/19/2017 04:00 PM    Sodium 138 04/14/2017 08:41 AM    Potassium 3.9 04/24/2017 08:15 AM    Potassium 4.5 04/23/2017 08:15 AM    Potassium 4.4 04/21/2017 08:15 AM    Potassium 4.4 04/19/2017 04:00 PM    Potassium 4.8 04/14/2017 08:41 AM    Chloride 99 04/24/2017 08:15 AM    Chloride 101 04/23/2017 08:15 AM    Chloride 101 04/21/2017 08:15 AM    Chloride 101 04/19/2017 04:00 PM    Chloride 97 04/14/2017 08:41 AM    CO2 33 04/24/2017 08:15 AM    CO2 29 04/23/2017 08:15 AM    CO2 24 04/21/2017 08:15 AM    CO2 25 04/19/2017 04:00 PM    CO2 24 04/14/2017 08:41 AM    Anion gap 6 04/24/2017 08:15 AM    Anion gap 9 04/23/2017 08:15 AM    Anion gap 15 04/21/2017 08:15 AM    Anion gap 13 04/19/2017 04:00 PM    Glucose 128 04/24/2017 08:15 AM    Glucose 190 04/23/2017 08:15 AM    Glucose 143 04/21/2017 08:15 AM    Glucose 109 04/19/2017 04:00 PM    Glucose 91 04/14/2017 08:41 AM    BUN 35 04/24/2017 08:15 AM    BUN 37 04/23/2017 08:15 AM    BUN 43 04/21/2017 08:15 AM    BUN 40 04/19/2017 04:00 PM    BUN 25 04/14/2017 08:41 AM    Creatinine 1.13 04/24/2017 08:15 AM    Creatinine 1.14 04/23/2017 08:15 AM    Creatinine 1.32 04/21/2017 08:15 AM    Creatinine 1.15 04/19/2017 04:00 PM    Creatinine 1.15 04/14/2017 08:41 AM    BUN/Creatinine ratio 31 04/24/2017 08:15 AM    BUN/Creatinine ratio 32 04/23/2017 08:15 AM    BUN/Creatinine ratio 33 04/21/2017 08:15 AM    BUN/Creatinine ratio 35 04/19/2017 04:00 PM    BUN/Creatinine ratio 22 04/14/2017 08:41 AM    GFR est AA 55 04/24/2017 08:15 AM    GFR est AA 54 04/23/2017 08:15 AM    GFR est AA 46 04/21/2017 08:15 AM    GFR est AA 53 04/19/2017 04:00 PM    GFR est AA 48 04/14/2017 08:41 AM    GFR est non-AA 45 04/24/2017 08:15 AM    GFR est non-AA 45 04/23/2017 08:15 AM    GFR est non-AA 38 04/21/2017 08:15 AM    GFR est non-AA 44 04/19/2017 04:00 PM    GFR est non-AA 41 04/14/2017 08:41 AM    Calcium 9.1 04/24/2017 08:15 AM    Calcium 8.7 04/23/2017 08:15 AM    Calcium 8.9 04/21/2017 08:15 AM    Calcium 9.0 04/19/2017 04:00 PM    Calcium 9.6 04/14/2017 08:41 AM    Bilirubin, total 0.7 04/19/2017 04:00 PM    Bilirubin, total 0.5 04/14/2017 08:41 AM    Bilirubin, total 0.3 07/21/2016 08:54 AM    Bilirubin, total 0.2 02/18/2016 10:21 AM    Bilirubin, total 0.3 09/10/2015 09:54 AM    AST (SGOT) 54 04/19/2017 04:00 PM    AST (SGOT) 12 04/14/2017 08:41 AM    AST (SGOT) 11 07/21/2016 08:54 AM    AST (SGOT) 13 02/18/2016 10:21 AM    AST (SGOT) 10 09/10/2015 09:54 AM    Alk. phosphatase 100 04/19/2017 04:00 PM    Alk. phosphatase 47 04/14/2017 08:41 AM    Alk. phosphatase 50 07/21/2016 08:54 AM    Alk. phosphatase 62 02/18/2016 10:21 AM    Alk.  phosphatase 73 09/10/2015 09:54 AM    Protein, total 6.7 04/19/2017 04:00 PM    Protein, total 6.3 04/14/2017 08:41 AM    Protein, total 6.4 07/21/2016 08:54 AM    Protein, total 6.9 02/18/2016 10:21 AM    Protein, total 6.4 09/10/2015 09:54 AM    Albumin 3.4 04/19/2017 04:00 PM    Albumin 4.2 04/14/2017 08:41 AM    Albumin 4.1 07/21/2016 08:54 AM    Albumin 4.5 02/18/2016 10:21 AM    Albumin 4.1 09/10/2015 09:54 AM    Globulin 3.3 04/19/2017 04:00 PM    A-G Ratio 1.0 04/19/2017 04:00 PM    A-G Ratio 2.0 04/14/2017 08:41 AM    A-G Ratio 1.8 07/21/2016 08:54 AM    A-G Ratio 1.9 02/18/2016 10:21 AM    A-G Ratio 1.8 09/10/2015 09:54 AM    ALT (SGPT) 67 04/19/2017 04:00 PM    ALT (SGPT) 8 04/14/2017 08:41 AM    ALT (SGPT) 7 07/21/2016 08:54 AM    ALT (SGPT) 7 02/18/2016 10:21 AM    ALT (SGPT) 9 09/10/2015 09:54 AM     Lab Results   Component Value Date/Time     04/19/2017 04:00 PM     Lab Results   Component Value Date/Time    Cholesterol, total 153 04/14/2017 08:41 AM    Cholesterol, total 169 07/21/2016 08:54 AM    Cholesterol, total 182 02/18/2016 10:21 AM    Cholesterol, total 182 09/10/2015 09:54 AM    Cholesterol, total 165 03/12/2015 10:22 AM    HDL Cholesterol 77 04/14/2017 08:41 AM    HDL Cholesterol 74 07/21/2016 08:54 AM    HDL Cholesterol 84 02/18/2016 10:21 AM    HDL Cholesterol 81 09/10/2015 09:54 AM    HDL Cholesterol 64 03/12/2015 10:22 AM    LDL, calculated 59 04/14/2017 08:41 AM    LDL, calculated 80 07/21/2016 08:54 AM    LDL, calculated 83 02/18/2016 10:21 AM    LDL, calculated 86 09/10/2015 09:54 AM    LDL, calculated 76 03/12/2015 10:22 AM    Triglyceride 87 04/14/2017 08:41 AM    Triglyceride 75 07/21/2016 08:54 AM    Triglyceride 77 02/18/2016 10:21 AM    Triglyceride 76 09/10/2015 09:54 AM    Triglyceride 126 03/12/2015 10:22 AM     No results found for this or any previous visit. Assessment:         Patient Active Problem List    Diagnosis Date Noted    Sick sinus syndrome ([facility] 05/09/2017    Complete heart block ([facility] 05/09/2017    Mitral stenosis 04/21/2017    Pulmonary hypertension ([facility] [facility] 04/21/2017    Aortic stenosis, moderate 04/21/2017    Iron deficiency anemia 04/20/2017    Osteoporosis 04/20/2017    Acute diastolic CHF (congestive heart failure) ([facility] [address] 04/19/2017    Bradyarrhythmia 04/19/2017    Bronchitis 04/19/2017    Advance care planning 01/17/2017    Neuropathy 10/11/2016    Gastroesophageal reflux disease 10/11/2016    Osteoarthritis 09/10/2015    PAD (peripheral artery disease) (HCC)     Anxiety     Elevated lipids     Asthma     Systolic hypertension, isolated     Chronic pain       80 y.o. female admitted to [facility] 4/19-4/24 with dyspnea, cough. Hx HTN, Asthma (lifelong), PAD and chronic pain presents with progressively worsening dyspnea, associated orthopnea of 3 pillows equivalent ,PND and cough--mostly non-productive . She saw her PCP on 4/14/17 For intractable 2 weeks of cough and shortness of breath and started on doxycycline, but She says that since then she's \"gone downhill,\" with poor appetite, increased GUZMÁN and increased pain in knees with limited mobility and depend on her cane. She says her chronic cough is really unchanged much and nonproductive. She also says that most of her breathing problem is caused by nasal and sinus congestion, not her lungs. No fever or chills. No chest pain. No prior known cardiac hx other than hypertension/hypertensive CVD. CXR with basilar changes. BNP elevated, trop with sl rise on serial. EKG with sinus/sinus darshan, first degree and intermittent second degree AV block, no acute ischemic changes. Rate slowing BP meds/beta blockers held, HR improved 70's. Echo with normal LV function, mod AS, mod MS, pulmonary hypertension. D/c'd to [facility], sx of GUZMÁN, fatigue, dizziness. To office today with BP 90/, HR 36 (no meds this am and not on anyt rate slowing meds--high grade/complete heart block). Complete Heart Block  Diastolic CHF  Mod AS/mod MR  Hypertension/hypertensive CVD  Pulm hypertension     Plan: To [facility] ER for ambulance transfer to [facility] for pacemaker.   Will notify [facility], ER, and arrange EP consult (Dr. Amye Lennox to accept from ER in transfer (discussed with Dr. Orestes Castillo)    Jeannie Mendez MD

## 2017-05-09 NOTE — H&P
[address] [place] [address]  [phone]          CARDIOLOGY HISTORY AND PHYSICAL    Date of  Admission: 5/9/2017  2:27 PM     Admission type:Emergency     Subjective: Buckner Bernheim is a 80 y.o. female admitted for chb. Transferred from [facility] with high degree block, 2nd degree, 30 BPM on ext pacemaker. Echo with normal LV function, mod AS, mod MS. Admitted for pacemaker implant. No previous cardiac hx, recent hospitalization with dyspnea, cough. Patient with acute renal failure due to fatigue, weakness, not hydrating. Seen by Dr. Annelise Lopez at [facility] office. Patient has no current c/o SOB, CP, dizziness/lightheadedeness, syncope. Prior to previous hospitalization living independently, driving.     Cardiac risk factors: obesity, sedentary life style, post-menopausal.        Patient Active Problem List    Diagnosis Date Noted    Sick sinus syndrome ([address] 05/09/2017    Complete heart block ([address] 05/09/2017    Mitral stenosis 04/21/2017    Pulmonary hypertension ([address] 04/21/2017    Aortic stenosis, moderate 04/21/2017    Iron deficiency anemia 04/20/2017    Osteoporosis 98/48/5123    Diastolic CHF, acute on chronic ([address] 04/19/2017    Bradyarrhythmia 04/19/2017    Bronchitis 04/19/2017    Advance care planning 01/17/2017    Neuropathy 10/11/2016    Gastroesophageal reflux disease 10/11/2016    Osteoarthritis 09/10/2015    [facility]AD [facility]     Anxiety     Elevated lipids     Asthma     Systolic hypertension, isolated     Chronic pain       Karla Finnegan MD  Past Medical History:   Diagnosis Date    Anxiety     Asthma     Cancer ([address]     breast    Chronic pain     Elevated lipids     GERD (gastroesophageal reflux disease)     Heart murmur     Hypercholesterolemia     Hypertension     Leg cramps     Osteoporosis     PAD (peripheral artery disease) ([address]       Social History     Social History    Marital status: UNKNOWN     Spouse name: N/A    Number of children: 1    Years of education: N/A     Social History Main Topics    Smoking status: Never Smoker    Smokeless tobacco: Never Used    Alcohol use No    Drug use: No    Sexual activity: Not Currently     Partners: Male     Other Topics Concern    None     Social History Narrative    Lives alone, her son lives at [facility] and visit/help her once in a week,     Allergies   Allergen Reactions    Amoxicillin Unknown (comments)    Biaxin [Clarithromycin] Unknown (comments)    Celebrex [Celecoxib] Other (comments)     Blood in urine    Indocin [Indomethacin] Unknown (comments)    Zithromax [Azithromycin] Unknown (comments)     Patient said she is allergic all Mycins      Family History   Problem Relation Age of Onset    Cancer Brother      kidney CA      Current Facility-Administered Medications   Medication Dose Route Frequency    fentaNYL citrate (PF) injection 25-50 mcg  25-50 mcg IntraVENous Multiple    midazolam (VERSED) injection 0.5-2 mg  0.5-2 mg IntraVENous Multiple    bacitracin injection 50,000 Units  50,000 Units Irrigation ONCE    heparinized saline 2 units/mL infusion 1,000 Units  1,000 Units Irrigation ONCE    lidocaine (XYLOCAINE) 10 mg/mL (1 %) injection 1-40 mL  1-40 mL SubCUTAneous Multiple    vancomycin (VANCOCIN) 1,000 mg in 0.9% sodium chloride (MBP/ADV) 250 mL  1,000 mg IntraVENous ONCE    sodium chloride (NS) flush 5-10 mL  5-10 mL IntraVENous Q8H    sodium chloride (NS) flush 5-10 mL  5-10 mL IntraVENous PRN    ADDaptor        vancomycin (VANCOCIN) 1,000 mg injection        0.9% sodium chloride (MBP/ADV) 0.9 % infusion        bacitracin 50,000 unit injection             Review of Symptoms:  Constitutional: negative  Eyes: negative  Ears, nose, mouth, throat, and face: negative  Respiratory: GUZMÁN  Cardiovascular: negative  Gastrointestinal: negative  Genitourinary:negative  Musculoskeletal:fatigue, weakness  Neurological: negative  Behvioral/Psych: negative  Endocrine: negative     Objective:   Physical Exam:  General Appearance:  elderly  female in no qdcute distress  Chest:   Clear  Cardiovascular:  darshan 2/6 murmur.   Abdomen:   Soft, non-tender, bowel sounds are active.   Extremities: no peripheral edema  Skin:  Warm and dry.     Visit Vitals    BP 97/48    Pulse (!) 45    Temp 97.4 °F (36.3 °C)    Resp 18    SpO2 98%       Cardiographics    Telemetry: 2nd deg HB  ECnd degree HB      Labs:  Recent Labs      17   1529   WBC  4.9   HGB  8.3*   HCT  27.3*   PLT  161     Recent Labs      17   1529   NA  135*   K  6.0*   CL  102   CO2  22   GLU  74   BUN  72*   CREA  3.76*   CA  9.1   MG  2.4   ALB  2.8*   TBILI  0.5   SGOT  6*   ALT  10*   INR  1.1       Recent Labs      17   1529   TROIQ  <0.04   CPK  20*   CKMB  1.4          Assessment:       Active Problems:    Complete heart block (HCC) (2017)          Plan:     Complete Heart Block:  Ms. Liana Espinosa is a candidate for a pacemaker implant. Dr. Lida Espinosa and  I discussed the risks/benefits/alternatives of the procedure with the patient. Risks include (but are not limited to) bleeding, infection, cva/mi/death. The patient understands and would like to proceed. Acute renal failure:  Post pacemaker implant will hydrate and recheck in AM.      HTN:  Hypotension, holding meds until BP recovers. Mod AS/AR     Pt seen and examined. Agree with assessment and plan as documented above.     Gretchen Hayes MD, Francheska Marcano

## 2017-05-10 LAB
ALBUMIN SERPL BCP-MCNC: 2.7 G/DL (ref 3.5–5)
ALBUMIN/GLOB SERPL: 0.8 {RATIO} (ref 1.1–2.2)
ALP SERPL-CCNC: 73 U/L (ref 45–117)
ALT SERPL-CCNC: 9 U/L (ref 12–78)
ANION GAP BLD CALC-SCNC: 8 MMOL/L (ref 5–15)
AST SERPL W P-5'-P-CCNC: 11 U/L (ref 15–37)
ATRIAL RATE: 100 BPM
ATRIAL RATE: 78 BPM
BASOPHILS # BLD AUTO: 0 K/UL (ref 0–0.1)
BASOPHILS # BLD: 0 % (ref 0–1)
BILIRUB SERPL-MCNC: 0.6 MG/DL (ref 0.2–1)
BUN SERPL-MCNC: 63 MG/DL (ref 6–20)
BUN/CREAT SERPL: 22 (ref 12–20)
CALCIUM SERPL-MCNC: 9.2 MG/DL (ref 8.5–10.1)
CALCULATED P AXIS, ECG09: 21 DEGREES
CALCULATED P AXIS, ECG09: 42 DEGREES
CALCULATED R AXIS, ECG10: -78 DEGREES
CALCULATED R AXIS, ECG10: 53 DEGREES
CALCULATED T AXIS, ECG11: 41 DEGREES
CALCULATED T AXIS, ECG11: 87 DEGREES
CHLORIDE SERPL-SCNC: 104 MMOL/L (ref 97–108)
CO2 SERPL-SCNC: 23 MMOL/L (ref 21–32)
CREAT SERPL-MCNC: 2.91 MG/DL (ref 0.55–1.02)
DIAGNOSIS, 93000: NORMAL
DIAGNOSIS, 93000: NORMAL
DIFFERENTIAL METHOD BLD: ABNORMAL
EOSINOPHIL # BLD: 0.1 K/UL (ref 0–0.4)
EOSINOPHIL NFR BLD: 2 % (ref 0–7)
ERYTHROCYTE [DISTWIDTH] IN BLOOD BY AUTOMATED COUNT: 21 % (ref 11.5–14.5)
GLOBULIN SER CALC-MCNC: 3.3 G/DL (ref 2–4)
GLUCOSE SERPL-MCNC: 87 MG/DL (ref 65–100)
HCT VFR BLD AUTO: 26.8 % (ref 35–47)
HGB BLD-MCNC: 8.1 G/DL (ref 11.5–16)
LYMPHOCYTES # BLD AUTO: 11 % (ref 12–49)
LYMPHOCYTES # BLD: 0.6 K/UL (ref 0.8–3.5)
MCH RBC QN AUTO: 22.2 PG (ref 26–34)
MCHC RBC AUTO-ENTMCNC: 30.2 G/DL (ref 30–36.5)
MCV RBC AUTO: 73.4 FL (ref 80–99)
MONOCYTES # BLD: 0.3 K/UL (ref 0–1)
MONOCYTES NFR BLD AUTO: 6 % (ref 5–13)
NEUTS SEG # BLD: 4.5 K/UL (ref 1.8–8)
NEUTS SEG NFR BLD AUTO: 81 % (ref 32–75)
P-R INTERVAL, ECG05: 208 MS
PLATELET # BLD AUTO: 183 K/UL (ref 150–400)
POTASSIUM SERPL-SCNC: 5.8 MMOL/L (ref 3.5–5.1)
PROT SERPL-MCNC: 6 G/DL (ref 6.4–8.2)
Q-T INTERVAL, ECG07: 446 MS
Q-T INTERVAL, ECG07: 506 MS
QRS DURATION, ECG06: 128 MS
QRS DURATION, ECG06: 144 MS
QTC CALCULATION (BEZET), ECG08: 437 MS
QTC CALCULATION (BEZET), ECG08: 508 MS
RBC # BLD AUTO: 3.65 M/UL (ref 3.8–5.2)
RBC MORPH BLD: ABNORMAL
RBC MORPH BLD: ABNORMAL
SODIUM SERPL-SCNC: 135 MMOL/L (ref 136–145)
VENTRICULAR RATE, ECG03: 45 BPM
VENTRICULAR RATE, ECG03: 78 BPM
WBC # BLD AUTO: 5.5 K/UL (ref 3.6–11)

## 2017-05-10 PROCEDURE — 36415 COLL VENOUS BLD VENIPUNCTURE: CPT | Performed by: INTERNAL MEDICINE

## 2017-05-10 PROCEDURE — 74011250636 HC RX REV CODE- 250/636: Performed by: INTERNAL MEDICINE

## 2017-05-10 PROCEDURE — 85025 COMPLETE CBC W/AUTO DIFF WBC: CPT | Performed by: INTERNAL MEDICINE

## 2017-05-10 PROCEDURE — 65660000000 HC RM CCU STEPDOWN

## 2017-05-10 PROCEDURE — 74011250637 HC RX REV CODE- 250/637: Performed by: INTERNAL MEDICINE

## 2017-05-10 PROCEDURE — 99218 HC RM OBSERVATION: CPT

## 2017-05-10 PROCEDURE — 80053 COMPREHEN METABOLIC PANEL: CPT | Performed by: INTERNAL MEDICINE

## 2017-05-10 RX ADMIN — Medication 10 ML: at 14:00

## 2017-05-10 RX ADMIN — BUMETANIDE 1 MG: 1 TABLET ORAL at 08:01

## 2017-05-10 RX ADMIN — SUCRALFATE 1 G: 1 SUSPENSION ORAL at 08:01

## 2017-05-10 RX ADMIN — SODIUM CHLORIDE 100 ML/HR: 900 INJECTION, SOLUTION INTRAVENOUS at 03:45

## 2017-05-10 RX ADMIN — FLUTICASONE FUROATE 2 PUFF: 100 POWDER RESPIRATORY (INHALATION) at 08:01

## 2017-05-10 RX ADMIN — LORATADINE 10 MG: 10 TABLET ORAL at 08:01

## 2017-05-10 RX ADMIN — ASPIRIN 81 MG 81 MG: 81 TABLET ORAL at 08:01

## 2017-05-10 RX ADMIN — HYDROCODONE BITARTRATE AND ACETAMINOPHEN 1 TABLET: 5; 325 TABLET ORAL at 03:51

## 2017-05-10 RX ADMIN — GABAPENTIN 100 MG: 100 CAPSULE ORAL at 21:33

## 2017-05-10 RX ADMIN — GEMFIBROZIL 600 MG: 600 TABLET ORAL at 08:01

## 2017-05-10 RX ADMIN — SUCRALFATE 1 G: 1 SUSPENSION ORAL at 14:12

## 2017-05-10 RX ADMIN — SODIUM CHLORIDE 100 ML/HR: 900 INJECTION, SOLUTION INTRAVENOUS at 17:17

## 2017-05-10 RX ADMIN — CILOSTAZOL 100 MG: 100 TABLET ORAL at 17:11

## 2017-05-10 RX ADMIN — GEMFIBROZIL 600 MG: 600 TABLET ORAL at 17:08

## 2017-05-10 RX ADMIN — Medication 10 ML: at 05:06

## 2017-05-10 RX ADMIN — CITALOPRAM HYDROBROMIDE 40 MG: 20 TABLET ORAL at 08:01

## 2017-05-10 RX ADMIN — SUCRALFATE 1 G: 1 SUSPENSION ORAL at 17:08

## 2017-05-10 RX ADMIN — CILOSTAZOL 100 MG: 100 TABLET ORAL at 08:01

## 2017-05-10 RX ADMIN — Medication 10 ML: at 21:33

## 2017-05-10 RX ADMIN — SENNOSIDES A AND B 8.6 MG: 8.6 TABLET, FILM COATED ORAL at 08:01

## 2017-05-10 RX ADMIN — PANTOPRAZOLE SODIUM 40 MG: 40 TABLET, DELAYED RELEASE ORAL at 08:01

## 2017-05-10 NOTE — PROGRESS NOTES
1945: Bedside and Verbal shift change report given to Jena [address] (oncoming nurse) by Serjio Ng RN (offgoing nurse). Report included the following information SBAR. No needs expressed at this time. Safety teaching done. 2350: Patient with 2 assist to the bathroom. Patient very unsteady and required two nurse assistance. Could not be walked down the hurtado due to safety concerns. Patient says she has not walked in 9 days. 1129: Primary nurse unable to drawn blood. Another RN bryan pst and lav.    0503: EKG done and transmitted. 0171: After permission obtained from the patient, the daughter was updated via telephone on her condition. 9678: Patient has slept throughout the night, with the exception of waking up to urinate. Patient knows when she has to urinate, but has very little notice, so she sometimes is incontinent on the way to the [facility]. Patient weak and had complaints of generalized pain, which resolved when medicated. The ice pack has remained on the left chest and the site has not changed in appearance since initial assessment.

## 2017-05-10 NOTE — PROGRESS NOTES
Cardiology Progress Note            [address]  [phone]    5/10/2017 8:24 AM    Admit Date: 5/9/2017    Admit Diagnosis: chb    Subjective: Gilbert Hughes   denies chest pain.     Visit Vitals    /54    Pulse 82    Temp 97.4 °F (36.3 °C)    Resp 15    Wt 162 lb 11.2 oz (73.8 kg)    SpO2 100%    BMI 29.76 kg/m2     Current Facility-Administered Medications   Medication Dose Route Frequency    lidocaine (XYLOCAINE) 10 mg/mL (1 %) injection 1-40 mL  1-40 mL SubCUTAneous Multiple    albuterol (PROVENTIL HFA, VENTOLIN HFA, PROAIR HFA) inhaler 2 Puff  2 Puff Inhalation Q4H PRN    gabapentin (NEURONTIN) capsule 100 mg  100 mg Oral QHS    gemfibrozil (LOPID) tablet 600 mg  600 mg Oral ACB&D    citalopram (CELEXA) tablet 40 mg  40 mg Oral DAILY    cilostazol (PLETAL) tablet 100 mg  100 mg Oral ACB&D    HYDROcodone-acetaminophen (NORCO) 5-325 mg per tablet 1 Tab  1 Tab Oral Q8H PRN    albuterol (PROVENTIL VENTOLIN) nebulizer solution 2.5 mg  2.5 mg Nebulization Q4H PRN    ALPRAZolam (XANAX) tablet 0.25 mg  0.25 mg Oral TID PRN    aspirin chewable tablet 81 mg  81 mg Oral DAILY    bumetanide (BUMEX) tablet 1 mg  1 mg Oral DAILY    docusate sodium (COLACE) capsule 100 mg  100 mg Oral BID PRN    loratadine (CLARITIN) tablet 10 mg  10 mg Oral DAILY    magnesium hydroxide (MILK OF MAGNESIA) 400 mg/5 mL oral suspension 30 mL  30 mL Oral DAILY PRN    senna (SENOKOT) tablet 8.6 mg  1 Tab Oral DAILY    spironolactone (ALDACTONE) tablet 25 mg  25 mg Oral DAILY    sucralfate (CARAFATE) 100 mg/mL oral suspension 1 g  1 g Oral TIDAC    fluticasone furoate (ARNUITY ELLIPTA) 100 mcg/puff  2 Puff Inhalation DAILY    pantoprazole (PROTONIX) tablet 40 mg  40 mg Oral ACB    sodium chloride (NS) flush 5-10 mL  5-10 mL IntraVENous Q8H    sodium chloride (NS) flush 5-10 mL  5-10 mL IntraVENous PRN    ADDaptor        vancomycin (VANCOCIN) 1,000 mg injection        0.9% sodium chloride (MBP/ADV) 0.9 % infusion        sodium chloride (NS) flush 5-10 mL  5-10 mL IntraVENous Q8H    sodium chloride (NS) flush 5-10 mL  5-10 mL IntraVENous PRN    naloxone (NARCAN) injection 0.4 mg  0.4 mg IntraVENous PRN    0.9% sodium chloride infusion  100 mL/hr IntraVENous CONTINUOUS    hydroCHLOROthiazide (HYDRODIURIL) tablet 25 mg  25 mg Oral DAILY    valsartan (DIOVAN) tablet 320 mg  320 mg Oral DAILY         Objective:      Visit Vitals    /54    Pulse 82    Temp 97.4 °F (36.3 °C)    Resp 15    Wt 162 lb 11.2 oz (73.8 kg)    SpO2 100%    BMI 29.76 kg/m2       Physical Exam:  Abdomen: soft, non-tender  Extremities: extremities normal  Heart: regular rate and rhythm  Lungs: clear to auscultation bilaterally  Pulses: 2+ and symmetric    Data Review:   Labs:    Recent Labs      05/10/17   0414  05/09/17   1529   WBC  5.5  4.9   HGB  8.1*  8.3*   HCT  26.8*  27.3*   PLT  183  161     Recent Labs      05/10/17   0414  05/09/17   1529   NA  135*  135*   K  5.8*  6.0*   CL  104  102   CO2  23  22   GLU  87  74   BUN  63*  72*   CREA  2.91*  3.76*   CA  9.2  9.1   MG   --   2.4   ALB  2.7*  2.8*   TBILI  0.6  0.5   SGOT  11*  6*   ALT  9*  10*   INR   --   1.1       Recent Labs      05/09/17   1529   TROIQ  <0.04   CPK  20*   CKMB  1.4         Intake/Output Summary (Last 24 hours) at 05/10/17 0824  Last data filed at 05/10/17 0536   Gross per 24 hour   Intake           978.34 ml   Output             1100 ml   Net          -121.66 ml        Telemetry: v paced    Pacer - v paced    Assessment:     Active Problems:    Complete heart block ([address] (5/9/2017)        Plan: Sarah Florez is sp ppm for chb. K and Cr are improving with hydration. Will cont IVF another 24 hours.     Kerrie Ware MD, [facility]    5/10/2017

## 2017-05-10 NOTE — PROGRESS NOTES
The patient is a 80year old female who was seen by Dr Nataliia Sethi who sent her to the local ED due to total heart block. From there she was sent to [facility]. She has a pacemaker placed 5/9/2017. She has a history of PAD, sick sinus syndrome, mitral stenosis, aortic stenosis, pulmonary HTN, with acute total heart block. She is currently at [facility] for rehab and will accept the patient back at discharge. ([phone] phone,[phone]-fax.)  CM faxed clinical information to the snf. She lives with her son Quintin Cooks and he will currently be out of the area for a week or so for work. CM verified his contact information and [facility] gave CM his e-mail as well. It was added to the chart. CM called the son and left a message to call CM. Care Management Interventions  PCP Verified by CM: Yes (unknown )  Mode of Transport at Discharge: BLS  Transition of Care Consult (CM Consult): SNF, Discharge Planning ([address] [place] [address])  Partner SNF: No  Reason Why Partner SNF Not Chosen: Positive previous encounter  Discharge Durable Medical Equipment: No (currently at snf for rehab-)  Physical Therapy Consult: Yes (pending)  Occupational Therapy Consult: No  Speech Therapy Consult: No  Current Support Network: Relative's Home (lives with her son who is currently out of town-patient was in a snf for rehab at [facility])   AINSLEY Chacko RN #[id number]

## 2017-05-10 NOTE — CARDIO/PULMONARY
CP Rehab Note: chart review    Admit: slow HR/heart block - pacemaker    Mhx: PAD, HTN, heart murmur, hypercholesterolemia, elevated lipids, anxiety, EF 55-60% on 4/16/17    Never smoked. Printed material given and discussed re: pacemakers, pacemaker discharge instructions and the TLC diet. Discussed post pacemaker instructions including: restrictions for the affected arm (no raising the arm above shoulder level, no heavy lifting for 30 days), monitoring for infection, avoiding impacts/pressure to the site, avoiding extreme activities, when to call the doctor, use of cell phones and microwaves and avoiding strong magnetic fields. Discussed HH diet and watching sodium intake. Patient indicated she would be in hospital a couple days because she is dehydrated. Patient is concerned about help at home when discharged since son that usually helps her will be out of town. Encouraged patient to discuss concerns with nursing. Pt verbalized understanding.

## 2017-05-10 NOTE — PROGRESS NOTES
1900--bedside report received from armando puente. Pt. Resting in bed oriented x 4, denies pain, nausea, at this time. Small amount old serous. Sang. Drainage from left chest dressing. No swelling note. Call bell in reach. Assessment as noted. 2300--INC care provided, ice pack applied over surgical site, pt denies need for pain medication at this time, call bell in reach.     0300--bedside report given to armando alvarado who is assuming care of pt

## 2017-05-10 NOTE — PROGRESS NOTES
Bedside shift change report given to DeAngel RN (oncoming nurse) by Italia Dear RN (offgoing nurse). Report included the following information SBAR, Kardex, MAR, Recent Results and Med Rec Status.

## 2017-05-11 VITALS
OXYGEN SATURATION: 95 % | TEMPERATURE: 97.2 F | RESPIRATION RATE: 18 BRPM | BODY MASS INDEX: 29.6 KG/M2 | WEIGHT: 161.82 LBS | SYSTOLIC BLOOD PRESSURE: 133 MMHG | DIASTOLIC BLOOD PRESSURE: 63 MMHG | HEART RATE: 98 BPM

## 2017-05-11 PROBLEM — N17.9 ACUTE RENAL FAILURE (HCC): Status: ACTIVE | Noted: 2017-05-11

## 2017-05-11 PROBLEM — Z95.0 S/P CARDIAC PACEMAKER PROCEDURE: Status: ACTIVE | Noted: 2017-05-11

## 2017-05-11 LAB
ALBUMIN SERPL BCP-MCNC: 2.6 G/DL (ref 3.5–5)
ALBUMIN/GLOB SERPL: 0.8 {RATIO} (ref 1.1–2.2)
ALP SERPL-CCNC: 75 U/L (ref 45–117)
ALT SERPL-CCNC: 11 U/L (ref 12–78)
ANION GAP BLD CALC-SCNC: 8 MMOL/L (ref 5–15)
AST SERPL W P-5'-P-CCNC: 12 U/L (ref 15–37)
BACTERIA SPEC CULT: ABNORMAL
BILIRUB SERPL-MCNC: 0.4 MG/DL (ref 0.2–1)
BUN SERPL-MCNC: 45 MG/DL (ref 6–20)
BUN/CREAT SERPL: 24 (ref 12–20)
CALCIUM SERPL-MCNC: 9.1 MG/DL (ref 8.5–10.1)
CC UR VC: ABNORMAL
CHLORIDE SERPL-SCNC: 113 MMOL/L (ref 97–108)
CO2 SERPL-SCNC: 20 MMOL/L (ref 21–32)
CREAT SERPL-MCNC: 1.86 MG/DL (ref 0.55–1.02)
GLOBULIN SER CALC-MCNC: 3.2 G/DL (ref 2–4)
GLUCOSE SERPL-MCNC: 110 MG/DL (ref 65–100)
POTASSIUM SERPL-SCNC: 5.3 MMOL/L (ref 3.5–5.1)
PROT SERPL-MCNC: 5.8 G/DL (ref 6.4–8.2)
SERVICE CMNT-IMP: ABNORMAL
SODIUM SERPL-SCNC: 141 MMOL/L (ref 136–145)

## 2017-05-11 PROCEDURE — G8978 MOBILITY CURRENT STATUS: HCPCS | Performed by: PHYSICAL THERAPIST

## 2017-05-11 PROCEDURE — 97116 GAIT TRAINING THERAPY: CPT | Performed by: PHYSICAL THERAPIST

## 2017-05-11 PROCEDURE — 97530 THERAPEUTIC ACTIVITIES: CPT | Performed by: OCCUPATIONAL THERAPIST

## 2017-05-11 PROCEDURE — 97165 OT EVAL LOW COMPLEX 30 MIN: CPT | Performed by: OCCUPATIONAL THERAPIST

## 2017-05-11 PROCEDURE — 36415 COLL VENOUS BLD VENIPUNCTURE: CPT | Performed by: NURSE PRACTITIONER

## 2017-05-11 PROCEDURE — 65270000029 HC RM PRIVATE

## 2017-05-11 PROCEDURE — 74011250637 HC RX REV CODE- 250/637: Performed by: INTERNAL MEDICINE

## 2017-05-11 PROCEDURE — 97161 PT EVAL LOW COMPLEX 20 MIN: CPT | Performed by: PHYSICAL THERAPIST

## 2017-05-11 PROCEDURE — 80053 COMPREHEN METABOLIC PANEL: CPT | Performed by: NURSE PRACTITIONER

## 2017-05-11 PROCEDURE — 99218 HC RM OBSERVATION: CPT

## 2017-05-11 PROCEDURE — G8979 MOBILITY GOAL STATUS: HCPCS | Performed by: PHYSICAL THERAPIST

## 2017-05-11 RX ADMIN — SENNOSIDES A AND B 8.6 MG: 8.6 TABLET, FILM COATED ORAL at 09:47

## 2017-05-11 RX ADMIN — PANTOPRAZOLE SODIUM 40 MG: 40 TABLET, DELAYED RELEASE ORAL at 09:46

## 2017-05-11 RX ADMIN — Medication 10 ML: at 05:20

## 2017-05-11 RX ADMIN — GEMFIBROZIL 600 MG: 600 TABLET ORAL at 09:47

## 2017-05-11 RX ADMIN — CITALOPRAM HYDROBROMIDE 40 MG: 20 TABLET ORAL at 09:45

## 2017-05-11 RX ADMIN — LORATADINE 10 MG: 10 TABLET ORAL at 09:47

## 2017-05-11 RX ADMIN — HYDROCODONE BITARTRATE AND ACETAMINOPHEN 1 TABLET: 5; 325 TABLET ORAL at 09:46

## 2017-05-11 RX ADMIN — ASPIRIN 81 MG 81 MG: 81 TABLET ORAL at 09:46

## 2017-05-11 RX ADMIN — CILOSTAZOL 100 MG: 100 TABLET ORAL at 09:46

## 2017-05-11 RX ADMIN — SUCRALFATE 1 G: 1 SUSPENSION ORAL at 09:42

## 2017-05-11 NOTE — PROGRESS NOTES
TRANSFER - OUT REPORT:    Verbal report given to Aviva(name) on Barak Davalos  being transferred to [facility](unit) for routine progression of care       Report consisted of patients Situation, Background, Assessment and   Recommendations(SBAR). Information from the following report(s) SBAR, Kardex, Intake/Output, MAR, Accordion and Recent Results was reviewed with the receiving nurse. Lines:       Opportunity for questions and clarification was provided.       Patient transported with:

## 2017-05-11 NOTE — PROGRESS NOTES
Problem: Mobility Impaired (Adult and Pediatric)  Goal: *Acute Goals and Plan of Care (Insert Text)  Physical Therapy Goals  Initiated 5/11/2017  1. Patient will move from supine to sit and sit to supine in bed with supervision/set-up within 7 day(s). 2. Patient will transfer from bed to chair and chair to bed with minimal assistance/contact guard assist using the least restrictive device within 7 day(s). 3. Patient will perform sit to stand with minimal assistance/contact guard assist within 7 day(s). 4. Patient will ambulate with minimal assistance/contact guard assist for 50 feet with the least restrictive device within 7 day(s). PHYSICAL THERAPY EVALUATION  SEEN 1145 TO 1201        Patient: Ayaz Pickett (80 y.o. female)  Date: 5/11/2017  Primary Diagnosis: chb  Complete heart block (HCC)        Precautions: Falls        ASSESSMENT :  Based on the objective data described below, the patient presents with very painful knees with ROM into flexion. States that she has really bad arthritis from her head to her toes. Has a right rotator cuff tear and is not able to elevate the right shoulder greater than about 30 degrees and the left UE is still in a sling. She needed assist for bed mobility and coming to sit. Good sitting balance at bedside and in the chair. Stood with assist x 2. Upon first standing she started to shake and stare off. Sat her back down at bedside. She denied feeling dizzy or lightheaded. She looked like she was about to pass out in standing, but as soon as she sat down she looked fine. VSS. Stood again and she didn't have another episode. She stood for about 30 seconds before we tried to ambulate. She has a wide base of support. She was able to walk about 5' and then backed up to the chair. Denied any dizziness or lightheaded. Just stated that she felt so tired. /63 and HR 98. Daughter present during entire session. Informed nurse of all the above.   Stated that she usually lives alone, but has been in rehab at [facility] for several weeks. Patient will benefit from skilled intervention to address the above impairments. Patients rehabilitation potential is considered to be Good  Factors which may influence rehabilitation potential include:   [ ]         None noted  [ ]         Mental ability/status  [ ]         Medical condition  [X]         Home/family situation and support systems  [ ]         Safety awareness  [X]         Pain tolerance/management  [ ]         Other:        PLAN :  Recommendations and Planned Interventions:  [X]           Bed Mobility Training             [ ]    Neuromuscular Re-Education  [X]           Transfer Training                   [ ]    Orthotic/Prosthetic Training  [X]           Gait Training                         [ ]    Modalities  [X]           Therapeutic Exercises           [ ]    Edema Management/Control  [X]           Therapeutic Activities            [X]    Patient and Family Training/Education  [ ]           Other (comment):     Frequency/Duration: Patient will be followed by physical therapy  5 times a week to address goals. Discharge Recommendations: Back to [facility] for rehab  Further Equipment Recommendations for Discharge: defer to SNF rehab       SUBJECTIVE:   Patient stated Lianet Arteaga went to my doctor and got an antibiotic. It made me sick. I got sicker and sicker with each dose.       OBJECTIVE DATA SUMMARY:   HISTORY:    Past Medical History:   Diagnosis Date    Anxiety      Asthma      Cancer ([address]       breast    Chronic pain      Elevated lipids      GERD (gastroesophageal reflux disease)      Heart murmur      Hypercholesterolemia      Hypertension      Leg cramps      Osteoporosis      PAD (peripheral artery disease) ([facility])       Past Surgical History:   Procedure Laterality Date    BREAST SURGERY PROCEDURE UNLISTED        HX CATARACT REMOVAL        HX COLONOSCOPY   06/07/2006     Prior Level of Function/Home Situation: ambulated with a cane most of the time. Stated that when getting out of bed she usually used her RW. Son takes her to appointments. She doesn't drive. Personal factors and/or comorbidities impacting plan of care: lives alone, rotator cuff tear right shoulder and new pacemaker on left side           EXAMINATION/PRESENTATION/DECISION MAKING:   Critical Behavior:  Neurologic State: Alert  Orientation Level: Oriented X4  Cognition: Appropriate decision making, Appropriate for age attention/concentration, Appropriate safety awareness     Hearing: no difficulty     Skin:  Some bloody drainage on pacer dressing  Edema: none noted  Range Of Motion:  AROM: Generally decreased, functional (BLE, bad knees, can't raise right shoulder more than 30 degrees and left UE still in sling)     Strength:    Strength: Generally decreased, functional     Tone & Sensation:   Tone: Normal  Sensation: Impaired (bilateral feet, burning in weight bearing)      Coordination:  Coordination: Generally decreased, functional        Functional Mobility:  Bed Mobility:  Rolling: Moderate assistance  Supine to Sit: Moderate assistance  Scooting: Minimum assistance      Transfers:  Sit to Stand: Minimum assistance;Assist x2  Stand to Sit: Minimum assistance;Assist x2  Bed to Chair: Minimum assistance;Assist x2        Balance:   Sitting: Intact  Standing: Impaired  Standing - Static: Constant support; Fair  Standing - Dynamic : Poor     Ambulation/Gait Training:  Distance (ft): 5 Feet (ft)  Assistive Device: Gait belt (hand held assist x 2)  Ambulation - Level of Assistance: Minimal assistance;Assist x2  Gait Abnormalities: Decreased step clearance;Shuffling gait; Step to gait;Trunk sway increased (wide base)                    Therapeutic Exercises:    Toe wiggles, ankle pumps and circles, long arc quads, marching and hip horizontal abduction/adduction     Functional Measure:     Elder Mobility Scale      4/20            EMS and G-code impairment scale:  Percentage of impairment CH  0% CI  1-19% CJ  20-39% CK  40-59% CL  60-79% CM  80-99% CN  100%   EMS Score 0-20 20 17-19 13-16 9-12 5-8 1-4 0      Scores under 10  generally these patients are dependent in mobility maneuvers; require help with  basic ADL, such as transfers, toileting and dressing. Scores between 10  13  generally these patients are borderline in terms of safe mobility and  independence in ADL i.e. they require some help with some mobility maneuvers. Scores over 14  Generally these patients are able to perform mobility maneuvers alone and safely  and are independent in basic ADL. G codes: In compliance with CMSs Claims Based Outcome Reporting, the following G-code set was chosen for this patient based on their primary functional limitation being treated: The outcome measure chosen to determine the severity of the functional limitation was the Elderly Mobility Scale Score with a score of 4/20 which was correlated with the impairment scale. · Mobility - Walking and Moving Around:               - CURRENT STATUS:    CM - 80%-99% impaired, limited or restricted               - GOAL STATUS:           CL - 60%-79% impaired, limited or restricted               - D/C STATUS:                       ---------------To be determined---------------      Pain:  Pain Scale 1: Numeric (0 - 10)  Pain Intensity 1: 0     Activity Tolerance: Tolerated PT evaluation/treatment session fairly well. Did have a shaking incident upon the first time standing at bedside. Please refer to the flowsheet for vital signs taken during this treatment.   After treatment:   [X]         Patient left in no apparent distress sitting up in chair  [ ]         Patient left in no apparent distress in bed  [X]         Call bell left within reach  [X]         Nursing notified  [X]         Caregiver present (daughter0  [ ]         Bed alarm activated (not being used) COMMUNICATION/EDUCATION:   The patients plan of care was discussed with: Registered Nurse.  [X]         Fall prevention education was provided and the patient/caregiver indicated understanding. [ ]         Patient/family have participated as able in goal setting and plan of care. [X]         Patient/family agree to work toward stated goals and plan of care. [ ]         Patient understands intent and goals of therapy, but is neutral about his/her participation. [ ]         Patient is unable to participate in goal setting and plan of care.      Thank you for this referral.  Jaswinder Grey, PT  Time Calculation: 23 mins

## 2017-05-11 NOTE — PROGRESS NOTES
Care Management Interventions  PCP Verified by CM: Yes (unknown )  Mode of Transport at Discharge: BLS (by [facility])  Transition of Care Consult (CM Consult): SNF, Discharge Planning ([facility] [address])  Partner SNF: No  Reason Why Partner SNF Not Chosen: Positive previous encounter  Discharge Durable Medical Equipment: No (currently at snf for rehab-)  Physical Therapy Consult: Yes (pending)  Occupational Therapy Consult: No  Speech Therapy Consult: No  Current Support Network: Relative's Home (lives with her son who is currently out of town-patient was in a snf for rehab at [facility])  Confirm Follow Up Transport: Other (see comment) (son or facility by wheelchair Graciella Leather or ambulance)  Plan discussed with Pt/Family/Caregiver: Yes (the son and patient agreed with plan to return to [facility])  Discharge Location  Discharge Placement: Skilled nursing facility   AINSLEY Chacko RN #[id number]

## 2017-05-11 NOTE — PHYSICIAN ADVISORY
Letter of Status Determination:   Recommend hospitalization status upgraded from Observation to Inpatient  Status     Pt Name:  German Valdes   MR#  [id number]   [facility]#   [id number]   [address] [place], [place] [address]/  @ [facility]   Hospitalization date  5/9/2017  2:27 PM   Current Attending Physician  Angel Puente MD   Principal diagnosis  <principal problem not specified>   Complete heart block    Clinicals  80 y.o. y.o  female hospitalized with above diagnosis   This elderly lady was sent to ER by her local cardiologist with diagnosis of complete heart block. Since then she transferred to [facility] and here that diagnosis was confirmed and she now has gone through permanent pacemaker placement. She is noted to have presented with ARF, UTI (Urine culture growing >100K colonies of GNR). Her hgb has also dropped compared to last week of April 2017. This is a complex clinical situation. [place] [facility] criteria   Does  NOT apply [place]    STATUS DETERMINATION  This patient is at above rosalie risk of deterioration based on documented presenting clinical data, comorbid conditions, high risk of adverse events and current acute care course. Ms. German Valdes now meets Inpatient Admission status criteria in accordance with CMS regulation Section 43 .3. Specifically, due to medical necessity the patient's stay now exceeds Two Midnights. It is our recommendation that this patient's hospitalization status should be upgraded from  OBSERVATION to INPATIENT status.      The final decision of the patient's hospitalization status depends on the attending physician's judgment         Additional comments     Payor: Delia Santillan / Plan: [place] MEDICARE PART A & B / Product Type: Medicare /         Kvng Aponte MD MPH [facility] [address]   President Medical Staff, [address] [place] [address]    Cell  [phone]

## 2017-05-11 NOTE — PROGRESS NOTES
CM spoke with the patient's son on the phone. Cm informed him that the patient may go back to [facility] later today or in the am. He requested ambulance transport as she is a max assist bed to chair, fatigues and gets SOB easily, and he has been using ambulance transport for medical appointments. CM had been informed by [facility] that she was living with her son. He states that the patient does not live with him but lives alone. Son calls her 4-5 times a day to check on her, takes her to her appointments and other errands. His mother has refused to leave her home so far. CM will follow and arrange transport when informed patient ready to go.   Maico Thompson RN #[id number]

## 2017-05-11 NOTE — PROGRESS NOTES
Problem: Self Care Deficits Care Plan (Adult)  Goal: *Acute Goals and Plan of Care (Insert Text)  Occupational Therapy Goals  Initiated 5/11/2017  1. Patient will perform grooming with supervision/set-up within 7 day(s). 2. Patient will perform upper body dressing and lower body dressing with moderate assistance within 7 day(s). 3. Patient will perform standing adls for 3 minutes with minimal assistance/contact guard assist within 7 day(s). 4. Patient will perform toilet transfers with moderate assistance within 7 day(s). 5. Patient will perform all aspects of toileting with moderate assistance within 7 day(s). OCCUPATIONAL THERAPY EVALUATION  Patient: Sofia Michel (80 y.o. female)  Date: 5/11/2017  Primary Diagnosis: chb  Complete heart block (HCC)        Precautions: fall, pacemaker precautions         ASSESSMENT :  Based on the objective data described below, the patient presents with new pacemaker on 5/9/2017 with precautions, decreased ROM and pain due to arthritis, generalized weakness and decreased balance impairing independence and safety during adls and functional mobility. Pt is functioning at minimal assistance to maximal assistance for self care (requiring additional time for all adls at baseline) and is moderate assistance X1 1 to 2 for functional mobility. Pt is greatly limited by her pacemaker precautions on her left UE--her \"good side\", she has rotator cuff issues and has very limited ROM on her RUE/shoulder, which makes self care and mobility more of a challenge than ever for her. Pt reports that she has chronic pain (espec. Knees) and is medicated daily for pain. She reports that since she started rehab, the exercises have aggravated her R shoulder (rotator cuff baseline impairment) and it is even more limited now, than her baseline and is very painful. Pt reports that she is motivated to return to her independent lifestyle. She will return to rehab at discharge.        Patient will benefit from skilled intervention to address the above impairments. Patients rehabilitation potential is considered to be Good  Factors which may influence rehabilitation potential include:   [ ]             None noted  [ ]             Mental ability/status  [X]             Medical condition--rotator cuff impairment and B knees-arthritis  [ ]             Home/family situation and support systems  [ ]             Safety awareness  [ ]             Pain tolerance/management  [ ]             Other:        PLAN :  Recommendations and Planned Interventions:  [X]               Self Care Training                  [X]        Therapeutic Activities  [X]               Functional Mobility Training    [ ]        Cognitive Retraining  [X]               Therapeutic Exercises           [X]        Endurance Activities  [X]               Balance Training                   [ ]        Neuromuscular Re-Education  [ ]               Visual/Perceptual Training     [X]   Home Safety Training  [X]               Patient Education                 [X]        Family Training/Education  [ ]               Other (comment):     Frequency/Duration: Patient will be followed by occupational therapy 5 times a week to address goals. Discharge Recommendations: Rehab / [facility]  Further Equipment Recommendations for Discharge: tbd       SUBJECTIVE:   Patient stated I'll do anything you ask.       OBJECTIVE DATA SUMMARY:   HISTORY:   Past Medical History:   Diagnosis Date    Acute renal failure ([facility] [address] 5/11/2017    Anxiety      Asthma      Cancer (HCC)       breast    Chronic pain      Elevated lipids      GERD (gastroesophageal reflux disease)      Heart murmur      Hypercholesterolemia      Hypertension      Leg cramps      Osteoporosis      PAD (peripheral artery disease) ([facility] [address]      S/P cardiac pacemaker procedure 5/11/2017 5/9/17 [employer] dual chamber pacemaker     Past Surgical History:   Procedure Laterality Date    BREAST SURGERY PROCEDURE UNLISTED        HX CATARACT REMOVAL        HX COLONOSCOPY   06/07/2006        Prior Level of Function/Home Situation: Pt reports independence in self care, sponge bathing at the sink due to her inability to raise her knees high enough to get over the tub. Pt has never driven and relies on her son for transportation. Pt reported that she's had progressive weakness over the past several months (went to rehab to increase independence) , discovering recently that she needed a pacemaker for Heart Block. Pt reports that she has arthritis all over, but mostly complains of pain in B knees and her R shoulder. Expanded or extensive additional review of patient history: reviewed old OT notes from [facility]. [address] [facility] Final: (P) Private residence  # Steps to Enter: (P) 4  Rails to Enter: (P) Yes  Hand Rails : (P) Bilateral (too wide, son is w her)  One/Two Story Residence: (P) One story  Living Alone: (P) Yes (recently from rehab)  Support Systems: (P) Family member(s)  Patient Expects to be Discharged to[de-identified] (P) Rehabilitation facility  Current DME Used/Available at Home: (P) Cane, straight, Commode, bedside, Raised toilet seat, Walker, rolling  Tub or Shower Type: (P) Tub/Shower combination (pt sponge bathes)  [X]  Right hand dominant             [ ]  Left hand dominant     EXAMINATION OF PERFORMANCE DEFICITS:  Cognitive/Behavioral Status:  Neurologic State: Alert  Orientation Level: Oriented X4  Cognition: Appropriate decision making; Appropriate for age attention/concentration; Appropriate safety awareness  Perception: (P) Appears intact  Perseveration: (P) No perseveration noted  Safety/Judgement: (P) Awareness of environment; Insight into deficits     Skin: appears fragile, is generally intact     Edema: none observed--knees appear swollen/arthritic     Hearing:  intact     Vision/Perceptual:    Tracking: (P) Able to track stimulus in all quadrants w/o difficulty Acuity: (P) Impaired near vision; Impaired far vision    Corrective Lenses: (P) Glasses (not with patient - may be lost per pt, able to see board in room)     Range of Motion:  BUEs:   AROM: Generally decreased, minimally functional (BLE, bad knees, )  Very limited R shoulder due to rotator cuff impairment that was increased post her exercises in rehab --per pt report. Limited R elbow extension minus ~30 extension,  Shoulder flexion limited by approx 75% rom. Encouraged gentle seated ROM as tolerated--educated slow reps and to pain tolerance frequently throughout the day. Pt verbalized understanding. Strength:  BUEs:  Strength: Generally decreased, minimally functional                 Coordination:  Coordination: Generally decreased, functional  Fine Motor Skills-Upper: (P) Left Intact; Right Intact    Gross Motor Skills-Upper: (P) Left Impaired;Right Impaired     Tone & Sensation:     Tone: Normal  Sensation: (P)  (bilat burning feet and L numbness in 4and 5 fingers)                       Balance:  Sitting: Intact  Standing: Impaired  Standing - Static: Constant support; Fair  Standing - Dynamic : Poor     Functional Mobility and Transfers for ADLs:  Bed Mobility:  Rolling: Moderate assistance  Supine to Sit: Moderate assistance  Scooting: Minimum assistance     Transfers:  Sit to Stand: (P) Moderate assistance (pt in pain while coming to stand--knee pain bilat)  Stand to Sit: (P) Moderate assistance  Bed to Chair: (P)  (not performed-pt seated in chair upon arrival.)     ADL Assessment:  Feeding: (P) Setup     Oral Facial Hygiene/Grooming: (P) Minimum assistance     Bathing: (P) Maximum assistance     Upper Body Dressing: (P) Maximum assistance     Lower Body Dressing: (P) Maximum assistance     Toileting: (P) Maximum assistance                 ADL Intervention and task modifications:     Pt educated in appropriate gentle rom for arthritis.   Pt reports that exercises in rehab aggravated her condition. Encouraged slow, gentle repetitions frequently throughout the day to prevent stiffness and to her comfort. Pt participated in sit to and from s tanding X3, but in pain, crying out during transitions due to knee pain. Once standing, pt able to perform standing marching for one minute. Pt with increased pain and increased effort during functional mobility. Cognitive Retraining  Safety/Judgement: (P) Awareness of environment; Insight into deficits     Therapeutic Exercise:  As above, gentle ROM exercises to be perfromed independently   Functional Measure:  Barthel Index:      Bathin  Bladder: 5  Bowels: 5  Groomin  Dressin  Feeding: 10  Mobility: 0  Stairs: 0  Toilet Use: 5  Transfer (Bed to Chair and Back): 5  Total: 35         Barthel and G-code impairment scale:  Percentage of impairment CH  0% CI  1-19% CJ  20-39% CK  40-59% CL  60-79% CM  80-99% CN  100%   Barthel Score 0-100 100 99-80 79-60 59-40 20-39 1-19    0   Barthel Score 0-20 20 17-19 13-16 9-12 5-8 1-4 0      The Barthel ADL Index: Guidelines  1. The index should be used as a record of what a patient does, not as a record of what a patient could do. 2. The main aim is to establish degree of independence from any help, physical or verbal, however minor and for whatever reason. 3. The need for supervision renders the patient not independent. 4. A patient's performance should be established using the best available evidence. Asking the patient, friends/relatives and nurses are the usual sources, but direct observation and common sense are also important. However direct testing is not needed. 5. Usually the patient's performance over the preceding 24-48 hours is important, but occasionally longer periods will be relevant. 6. Middle categories imply that the patient supplies over 50 per cent of the effort. 7. Use of aids to be independent is allowed.      Rosie Jack, F.l., Barthel, DJeffW. (1965). Functional evaluation: the Barthel Index. [address] [facility] (14)2. Gillian Curly dimitrios [place], J.J.M.F, Terrence Ramey.Leah, [address] (1999). Measuring the change indisability after inpatient rehabilitation; comparison of the responsiveness of the Barthel Index and Functional Cook Measure. Journal of Neurology, Neurosurgery, and Psychiatry, 66(4), 179-898. CAROLIN Askew, JOLANTA Moscoso, & Nuria Kaplan M.A. (2004.) Assessment of post-stroke quality of life in cost-effectiveness studies: The usefulness of the Barthel Index and the EuroQoL-5D. Quality of Life Research, 13, 741-63         G codes: In compliance with CMSs Claims Based Outcome Reporting, the following G-code set was chosen for this patient based on their primary functional limitation being treated: The outcome measure chosen to determine the severity of the functional limitation was the Barthel Index with a score of 35/100 which was correlated with the impairment scale. · Self Care:               - CURRENT STATUS:    CL - 60%-79% impaired, limited or restricted               - GOAL STATUS:           CK - 40%-59% impaired, limited or restricted               - D/C STATUS:                       ---------------To be determined---------------      Occupational Therapy Evaluation Charge Determination   History Examination Decision-Making   MEDIUM Complexity : Expanded review of history including physical, cognitive and psychosocial  history  MEDIUM Complexity : 3-5 performance deficits relating to physical, cognitive , or psychosocial skils that result in activity limitations and / or participation restrictions MEDIUM Complexity : Patient may present with comorbidities that affect occupational performnce.  Miniml to moderate modification of tasks or assistance (eg, physical or verbal ) with assesment(s) is necessary to enable patient to complete evaluation       Based on the above components, the patient evaluation is determined to be of the following complexity level: MEDIUM  Pain:  Pain Scale 1: Numeric (0 - 10)  Pain Intensity 1: 0              Activity Tolerance:   Fair. Pt is very painful with sit to and from standing due to pain in knees, despite pain medication provided earlier in the day. HR remained above 100 in [id number] to 107 range. .  After treatment:   [X] Patient left in no apparent distress sitting up in chair  [ ] Patient left in no apparent distress in bed  [X] Call bell left within reach  [X] Nursing notified  [ ] Caregiver present  [ ] Bed alarm activated      COMMUNICATION/EDUCATION:   The patients plan of care was discussed with: Physical Therapist, Registered Nurse and . [ ] Home safety education was provided and the patient/caregiver indicated understanding. [X] Patient/family have participated as able in goal setting and plan of care. [ ] Patient/family agree to work toward stated goals and plan of care. [ ] Patient understands intent and goals of therapy, but is neutral about his/her participation. [ ] Patient is unable to participate in goal setting and plan of care. This patients plan of care is appropriate for delegation to [facility].      Thank you for this referral.  Mendez Zamudio, OTR/L   27 minutes

## 2017-05-11 NOTE — DISCHARGE SUMMARY
[address] [place] [address]  [phone]     Cardiology Discharge Summary     Patient ID:  Duy Ward  [id number]  65 y.o.  2/28/1925    Admit Date: 5/9/2017    Discharge Date: 5/11/2017     Admitting Physician: Ana Cardoso MD     Discharge Physician: Zeyad Gudino NP    Admission Diagnoses:   chb  Complete heart block [facility])    Discharge Diagnoses:    Active Problems:    Complete heart block (HCC) (5/9/2017)      S/P cardiac pacemaker procedure (5/11/2017)      Overview: 5/9/17 [employer] dual chamber pacemaker      Acute renal failure ([address] (5/11/2017)        Discharge Condition: Good    Cardiology Procedures this Admission:  [facility] pacemaker implant    Patient Active Problem List    Diagnosis Date Noted    S/P cardiac pacemaker procedure 05/11/2017    Acute renal failure ([address] 05/11/2017    Sick sinus syndrome ([address] 05/09/2017    Complete heart block ([address] 05/09/2017    Mitral stenosis 04/21/2017    Pulmonary hypertension ([address] 04/21/2017    Aortic stenosis, moderate 04/21/2017    Iron deficiency anemia 04/20/2017    Osteoporosis 65/01/7816    Diastolic CHF, acute on chronic ([address] 04/19/2017    Bradyarrhythmia 04/19/2017    Bronchitis 04/19/2017    Advance care planning 01/17/2017    Neuropathy 10/11/2016    Gastroesophageal reflux disease 10/11/2016    Osteoarthritis 09/10/2015    PAD (peripheral artery disease) (HCC)     Anxiety     Elevated lipids     Asthma     Hypertension     Chronic pain       Past Medical History:   Diagnosis Date    Acute renal failure (HCC) 5/11/2017    Anxiety     Asthma     Cancer (HCC)     breast    Chronic pain     Elevated lipids     GERD (gastroesophageal reflux disease)     Heart murmur     Hypercholesterolemia     Hypertension     Leg cramps     Osteoporosis     PAD (peripheral artery disease) ([address]     S/P cardiac pacemaker procedure 5/11/2017 5/9/17 [employer] dual chamber pacemaker      Social History     Social History    Marital status: UNKNOWN     Spouse name: N/A    Number of children: 1    Years of education: N/A     Social History Main Topics    Smoking status: Never Smoker    Smokeless tobacco: Never Used    Alcohol use No    Drug use: No    Sexual activity: Not Currently     Partners: Male     Other Topics Concern    None     Social History Narrative    Lives alone, her son lives at [place] and visit/help her once in a week,     Allergies   Allergen Reactions    Amoxicillin Unknown (comments)    Biaxin [Clarithromycin] Unknown (comments)    Celebrex [Celecoxib] Other (comments)     Blood in urine    Indocin [Indomethacin] Unknown (comments)    Zithromax [Azithromycin] Unknown (comments)     Patient said she is allergic all Mycins      Family History   Problem Relation Age of Onset    Cancer Brother      kidney CA        Hospital Course: Tulio Willis is a 80 y.o. female admitted for chb. Transferred from [facility] with high degree block, 2nd degree, 30 BPM on ext pacemaker. Echo with normal LV function, mod AS, mod MS. Admitted for pacemaker implant. No previous cardiac hx, recent hospitalization with dyspnea, cough. Patient with acute renal failure due to fatigue, weakness, not hydrating. Seen by Dr. Sheila Edwards at [facility] office. Patient has no current c/o SOB, CP, dizziness/lightheadedeness, syncope. Prior to previous hospitalization living independently, driving. Cardiac risk factors: obesity, sedentary life style, post-menopausal.    EXCERPT FROM DR. Tong Cardoza OFFICE NOTE IN [facility] [address]:   Tulio Willis is a 80 y.o. female is here for hospital  F/u/new patient evaluation. 80 y.o. female admitted [facility] [place] [facility]19-4/24 with dyspnea, cough. Hx HTN, Asthma (lifelong), PAD and chronic pain presents with progressively worsening dyspnea, associated orthopnea of 3 pillows equivalent ,PND and cough--mostly non-productive .  She saw her PCP on 4/14/17 For intractable 2 weeks of cough and shortness of breath and started on doxycycline, but She says that since then she's \"gone downhill,\" with poor appetite, increased GUZMÁN and increased pain in knees with limited mobility and depend on her cane. She says her chronic cough is really unchanged much and nonproductive. She also says that most of her breathing problem is caused by nasal and sinus congestion, not her lungs. No fever or chills. No chest pain. No prior known cardiac hx other than hypertension/hypertensive CVD. CXR with basilar changes. BNP elevated, trop with sl rise on serial. EKG with sinus/sinus darshan, first degree and intermittent second degree AV block, no acute ischemic changes. Rate slowing BP meds/beta blockers held, HR improved 70's. Echo with normal LV function, mod AS, mod MS. D/c'd to [facility], sx of GUZMÁN, fatigue, dizziness. To office today with BP 90/, HR 36 (no meds this am and not on anyt rate slowing meds--high grade/complete heart block). The patient denies chest pain, orthopnea, PND, LE edema, palpitations, syncope. Complete Heart Block:  Ms. Kaylene Padilla received [employer] dual chamber pacemaker implant on 5/9/17 to the left subclavian site. Currently AV paced. Site D/I. Please leave dressing on for 2 weeks. See discharge instructions for care and management. Acute renal failure: On admission with ARF. She had been on HCTZ, Bumex, spironolactone and Valsartan for BP and possible LE edema management, all of which were discontinued on admission. On admission K+ 6 and CR 3.76. Post pacemaker implant hydrated x 48 hours. Now K+ 5.3 and CR 1.86. The patient will remain off all of the above medications, and will have CHEMISTRY labs on 5/15/17 when she visits her PCP.       HTN:  Initially hypotensive, now BP normalizing at 133/63. Stopped all antiHTN medications due to low BP and ARF. Starting low dose amlodipine 2.5mg daily.       Debilitation:  PT evaluated with following assessment  ASSESSMENT :  Based on the objective data described below, the patient presents with very painful knees with ROM into flexion. States that she has really bad arthritis from her head to her toes. Has a right rotator cuff tear and is not able to elevate the right shoulder greater than about 30 degrees and the left UE is still in a sling. She needed assist for bed mobility and coming to sit. Good sitting balance at bedside and in the chair. Stood with assist x 2. Upon first standing she started to shake and stare off. Sat her back down at bedside. She denied feeling dizzy or lightheaded. She looked like she was about to pass out in standing, but as soon as she sat down she looked fine. VSS. Stood again and she didn't have another episode. She stood for about 30 seconds before we tried to ambulate. She has a wide base of support. She was able to walk about 5' and then backed up to the chair. Denied any dizziness or lightheaded. Just stated that she felt so tired. /63 and HR 98. Daughter present during entire session. Informed nurse of all the above. Stated that she usually lives alone, but has been in rehab at [facility] for several weeks.      Patient will benefit from skilled intervention to address the above impairments.   Patients rehabilitation potential is considered to be Good  Factors which may influence rehabilitation potential include:   [ ]  None noted  [ ]  Mental ability/status  [ ]  Medical condition  [X]  Home/family situation and support systems  [ ]  Safety awareness  [X]  Pain tolerance/management  [ ]  Other:        PLAN :  Recommendations and Planned Interventions:  [X]  Bed Mobility Training [ ]  Neuromuscular Re-Education  [X]  Transfer Training [ ] Orthotic/Prosthetic Training  [X]  Gait Training [ ] Modalities  [X]  Therapeutic Exercises [ ] Edema Management/Control  [X]  Therapeutic Activities [X] Patient and Family Training/Education  [ ]  Other (comment):      Frequency/Duration: Patient will be followed by physical therapy 5 times a week to address goals. Discharge Recommendations: [facility]  Further Equipment Recommendations for Discharge: defer to SNF rehab         Visit Vitals    /63 (BP 1 Location: Right arm, BP Patient Position: Post activity; Sitting)    Pulse 98    Temp 97.2 °F (36.2 °C)    Resp 18    Wt 73.4 kg (161 lb 13.1 oz)    SpO2 95%    BMI 29.6 kg/m2       Physical Exam  General:  Elderly  female in no acute distress, pleasantly demented  Abdomen: soft, non-tender. Bowel sounds normal.   Extremities: left SB site D/I, no peripheral edema   Heart: regular rate and rhythm, no murmur, S3/JVD  Lungs: clear to auscultation bilaterally  Neck: supple, symmetrical, trachea midline,     Labs:   Recent Labs      05/10/17   0414  05/09/17   1529   WBC  5.5  4.9   HGB  8.1*  8.3*   HCT  26.8*  27.3*   PLT  183  161     Recent Labs      05/11/17   0910  05/10/17   0414  05/09/17   1529   NA  141  135*  135*   K  5.3*  5.8*  6.0*   CL  113*  104  102   CO2  20*  23  22   GLU  110*  87  74   BUN  45*  63*  72*   CREA  1.86*  2.91*  3.76*   CA  9.1  9.2  9.1   MG   --    --   2.4   ALB  2.6*  2.7*  2.8*   TBILI  0.4  0.6  0.5   SGOT  12*  11*  6*   ALT  11*  9*  10*   INR   --    --   1.1       Recent Labs      05/09/17   1529   TROIQ  <0.04   CPK  20*   CKMB  1.4       EKG: initial [facility]  Cxray: no pneumothorax post pacemaker implant      Disposition: [facility]    Patient Instructions:   Current Discharge Medication List      CONTINUE these medications which have NOT CHANGED    Details   beclomethasone (QVAR) 40 mcg/actuation aero Take 2 Puffs by inhalation two (2) times a day. acetaminophen (TYLENOL) 325 mg tablet Take 2 Tabs by mouth every four (4) hours as needed.   Qty: 30 Tab, Refills: 0      albuterol (PROVENTIL VENTOLIN) 2.5 mg /3 mL (0.083 %) nebulizer solution 3 mL by Nebulization route every four (4) hours as needed for Wheezing. Qty: 24 Each, Refills: 0      ALPRAZolam (XANAX) 0.25 mg tablet Take 1 Tab by mouth three (3) times daily as needed for Anxiety. Max Daily Amount: 0.75 mg. Qty: 12 Tab, Refills: 0      aspirin 81 mg chewable tablet Take 1 Tab by mouth daily. Qty: 30 Tab, Refills: 0      docusate sodium (COLACE) 100 mg capsule Take 1 Cap by mouth two (2) times daily as needed for Constipation for up to 90 days. Qty: 60 Cap, Refills: 0      loratadine (CLARITIN) 10 mg tablet Take 1 Tab by mouth daily. Qty: 30 Tab, Refills: 0      magnesium hydroxide (MILK OF MAGNESIA) 400 mg/5 mL suspension Take 30 mL by mouth daily as needed for Constipation. Qty: 120 mL, Refills: 0      polyethylene glycol (MIRALAX) 17 gram packet Take 1 Packet by mouth daily as needed. Qty: 30 Packet, Refills: 0      senna (SENOKOT) 8.6 mg tablet Take 1 Tab by mouth daily. Qty: 30 Tab, Refills: 0      sucralfate (CARAFATE) 100 mg/mL suspension Take 10 mL by mouth Before breakfast, lunch, and dinner. Qty: 120 mL, Refills: 0      ferrous sulfate (IRON) 325 mg (65 mg iron) EC tablet Take one twice a day  Qty: 60 Tab, Refills: 1    Associated Diagnoses: Anemia, unspecified type      naloxone 4 mg/actuation spry 1 Spray by Nasal route as needed. Qty: 1 Cartridge, Refills: 1    Associated Diagnoses: Osteoarthritis of both knees, unspecified osteoarthritis type      HYDROcodone-acetaminophen (NORCO) 5-325 mg per tablet Take 1 Tab by mouth every eight (8) hours as needed. Max Daily Amount: 3 Tabs.   Qty: 90 Tab, Refills: 0    Associated Diagnoses: Osteoarthritis of both knees, unspecified osteoarthritis type      cilostazol (PLETAL) 100 mg tablet One bid  Qty: 180 Tab, Refills: 3    Associated Diagnoses: PAD (peripheral artery disease) ([facility])      gabapentin (NEURONTIN) 100 mg capsule TAKE ONE CAPSULE BY MOUTH AT BEDTIME FOR NEUROPATHIC PAIN  Qty: 30 Cap, Refills: 5    Associated Diagnoses: Neuropathy      gemfibrozil (LOPID) 600 mg tablet TAKE 1 TABLET BY MOUTH TWICE DAILY FOR CHOLESTROL  Qty: 60 Tab, Refills: 5    Associated Diagnoses: Elevated lipids      omeprazole (PRILOSEC) 20 mg capsule TAKE ONE CAPSULE BY MOUTH DAILY FOR STOMACH  Qty: 30 Cap, Refills: 5    Associated Diagnoses: Gastroesophageal reflux disease, esophagitis presence not specified      citalopram (CELEXA) 40 mg tablet TAKE 1 TABLET BY MOUTH AT BEDTIME FOR DEPRESSION  Qty: 30 Tab, Refills: 5    Associated Diagnoses: Anxiety      Calcium Carbonate-Vit D3-Min 600 mg calcium- 400 unit tab Take  by mouth two (2) times a day. guaiFENesin (MUCUS RELIEF) 400 mg tablet Take  by mouth two (2) times a day. albuterol (VENTOLIN HFA) 90 mcg/actuation inhaler SHAKE WELL. INHALE 2 PUFFS (1 MINUTE APART) EVERY 4 HOURS AS NEEDED FOR COUGH OR WHEEZE  Qty: 1 Inhaler, Refills: 5    Associated Diagnoses: Asthma, mild intermittent, uncomplicated      ascorbic acid (VITAMIN C) 500 mg tablet Take  by mouth. simethicone (GAS-X) 125 mg capsule Take 125 mg by mouth four (4) times daily as needed for Flatulence. krill-omega-3-dha-epa-lipids (KRILL OIL) 628-50-16-50 mg cap Take  by mouth. STOP taking these medications       bumetanide (BUMEX) 1 mg tablet Comments:   Reason for Stopping:         spironolactone (ALDACTONE) 25 mg tablet Comments:   Reason for Stopping:         hydroCHLOROthiazide (HYDRODIURIL) 25 mg tablet Comments:   Reason for Stopping:         valsartan (DIOVAN) 320 mg tablet Comments:   Reason for Stopping:               Referenced discharge instructions provided by nursing for diet and activity. Follow up with:  Dr. Hector Jacob, Cardiology, [place], June 1, 2017 at 11:20AM  PCP on Monday, 6/15/17    Signed:  Lucy Nam NP  5/11/2017  2:23 PM       Pt seen and examined in details. Agree with NP A&P. D/w pt and family.      Laury Brandt MD

## 2017-06-01 ENCOUNTER — OFFICE VISIT (OUTPATIENT)
Dept: CARDIOLOGY CLINIC | Age: 82
End: 2017-06-01

## 2017-06-01 VITALS
HEIGHT: 62 IN | BODY MASS INDEX: 31.28 KG/M2 | DIASTOLIC BLOOD PRESSURE: 84 MMHG | RESPIRATION RATE: 18 BRPM | HEART RATE: 87 BPM | WEIGHT: 170 LBS | SYSTOLIC BLOOD PRESSURE: 132 MMHG | OXYGEN SATURATION: 94 %

## 2017-06-01 DIAGNOSIS — I49.5 SICK SINUS SYNDROME (HCC): Primary | ICD-10-CM

## 2017-06-01 DIAGNOSIS — Z95.0 S/P CARDIAC PACEMAKER PROCEDURE: ICD-10-CM

## 2017-06-01 DIAGNOSIS — R60.0 LOCALIZED EDEMA: ICD-10-CM

## 2017-06-01 DIAGNOSIS — I73.9 PAD (PERIPHERAL ARTERY DISEASE) (HCC): ICD-10-CM

## 2017-06-01 DIAGNOSIS — I50.33 DIASTOLIC CHF, ACUTE ON CHRONIC (HCC): ICD-10-CM

## 2017-06-01 DIAGNOSIS — I05.0 MITRAL VALVE STENOSIS, UNSPECIFIED ETIOLOGY: ICD-10-CM

## 2017-06-01 DIAGNOSIS — I35.0 AORTIC STENOSIS, MODERATE: ICD-10-CM

## 2017-06-01 RX ORDER — FUROSEMIDE 20 MG/1
20 TABLET ORAL DAILY
Qty: 30 TAB | Refills: 5 | Status: SHIPPED | OUTPATIENT
Start: 2017-06-01

## 2017-06-01 NOTE — PROGRESS NOTES
Verified patient with two patient identifiers. Medications reviewed/approved by Dr. Laith Silva. Verbal from Dr. Laith iSlva to remove the medications that were deleted during the visit.

## 2017-06-01 NOTE — PROGRESS NOTES
Cheryle Verdin is a 80 y.o. female is here for routine f/u. S/p PPM placement. Some LE edema and small blister R foot--seen in ER. Still at [facility] with planned d/c today. F/u planned with PCP next week. The patient denies chest pain/ shortness of breath, orthopnea, PND,  palpitations, syncope, presyncope or fatigue.        Patient Active Problem List    Diagnosis Date Noted    S/P cardiac pacemaker procedure 05/11/2017    Acute renal failure ([address] 05/11/2017    Sick sinus syndrome ([address] 05/09/2017    Complete heart block ([address] 05/09/2017    Mitral stenosis 04/21/2017    Pulmonary hypertension ([address] 04/21/2017    Aortic stenosis, moderate 04/21/2017    Iron deficiency anemia 04/20/2017    Osteoporosis 86/53/2268    Diastolic CHF, acute on chronic ([address] 04/19/2017    Bradyarrhythmia 04/19/2017    Bronchitis 04/19/2017    Advance care planning 01/17/2017    Neuropathy 10/11/2016    Gastroesophageal reflux disease 10/11/2016    Osteoarthritis 09/10/2015    PAD (peripheral artery disease) (HCC)     Anxiety     Elevated lipids     Asthma     Hypertension     Chronic pain       Fer Turcios MD  Past Medical History:   Diagnosis Date    Acute renal failure ([address] 5/11/2017    Anxiety     Asthma     Cancer (HCC)     breast    Chronic pain     Elevated lipids     GERD (gastroesophageal reflux disease)     Heart murmur     Hypercholesterolemia     Hypertension     Leg cramps     Osteoporosis     PAD (peripheral artery disease) ([address]     S/P cardiac pacemaker procedure 5/11/2017 5/9/17 [employer] dual chamber pacemaker      Past Surgical History:   Procedure Laterality Date    BREAST SURGERY PROCEDURE UNLISTED      HX CATARACT REMOVAL      HX COLONOSCOPY  06/07/2006     Allergies   Allergen Reactions    Amoxicillin Unknown (comments)    Biaxin [Clarithromycin] Unknown (comments)    Celebrex [Celecoxib] Other (comments)     Blood in urine    Indocin [Indomethacin] Unknown (comments)    Zithromax [Azithromycin] Unknown (comments)     Patient said she is allergic all Mycins      Family History   Problem Relation Age of Onset    Cancer Brother      kidney CA      Social History     Social History    Marital status: UNKNOWN     Spouse name: N/A    Number of children: 1    Years of education: N/A     Occupational History    Not on file. Social History Main Topics    Smoking status: Never Smoker    Smokeless tobacco: Never Used    Alcohol use No    Drug use: No    Sexual activity: Not Currently     Partners: Male     Other Topics Concern    Not on file     Social History Narrative    Lives alone, her son lives at [place] and visit/help her once in a week,      Current Outpatient Prescriptions   Medication Sig    beclomethasone (QVAR) 40 mcg/actuation aero Take 2 Puffs by inhalation two (2) times a day.  acetaminophen (TYLENOL) 325 mg tablet Take 2 Tabs by mouth every four (4) hours as needed.  albuterol (PROVENTIL VENTOLIN) 2.5 mg /3 mL (0.083 %) nebulizer solution 3 mL by Nebulization route every four (4) hours as needed for Wheezing.  ALPRAZolam (XANAX) 0.25 mg tablet Take 1 Tab by mouth three (3) times daily as needed for Anxiety. Max Daily Amount: 0.75 mg.  aspirin 81 mg chewable tablet Take 1 Tab by mouth daily.  docusate sodium (COLACE) 100 mg capsule Take 1 Cap by mouth two (2) times daily as needed for Constipation for up to 90 days.  loratadine (CLARITIN) 10 mg tablet Take 1 Tab by mouth daily.  magnesium hydroxide (MILK OF MAGNESIA) 400 mg/5 mL suspension Take 30 mL by mouth daily as needed for Constipation.  polyethylene glycol (MIRALAX) 17 gram packet Take 1 Packet by mouth daily as needed.  senna (SENOKOT) 8.6 mg tablet Take 1 Tab by mouth daily.  sucralfate (CARAFATE) 100 mg/mL suspension Take 10 mL by mouth Before breakfast, lunch, and dinner.     ferrous sulfate (IRON) 325 mg (65 mg iron) EC tablet Take one twice a day    naloxone 4 mg/actuation spry 1 Spray by Nasal route as needed.  HYDROcodone-acetaminophen (NORCO) 5-325 mg per tablet Take 1 Tab by mouth every eight (8) hours as needed. Max Daily Amount: 3 Tabs.  cilostazol (PLETAL) 100 mg tablet One bid    gabapentin (NEURONTIN) 100 mg capsule TAKE ONE CAPSULE BY MOUTH AT BEDTIME FOR NEUROPATHIC PAIN    gemfibrozil (LOPID) 600 mg tablet TAKE 1 TABLET BY MOUTH TWICE DAILY FOR CHOLESTROL    omeprazole (PRILOSEC) 20 mg capsule TAKE ONE CAPSULE BY MOUTH DAILY FOR STOMACH    citalopram (CELEXA) 40 mg tablet TAKE 1 TABLET BY MOUTH AT BEDTIME FOR DEPRESSION    Calcium Carbonate-Vit D3-Min 600 mg calcium- 400 unit tab Take  by mouth two (2) times a day.  guaiFENesin (MUCUS RELIEF) 400 mg tablet Take  by mouth two (2) times a day.  albuterol (VENTOLIN HFA) 90 mcg/actuation inhaler SHAKE WELL. INHALE 2 PUFFS (1 MINUTE APART) EVERY 4 HOURS AS NEEDED FOR COUGH OR WHEEZE    ascorbic acid (VITAMIN C) 500 mg tablet Take  by mouth.  simethicone (GAS-X) 125 mg capsule Take 125 mg by mouth three (3) times daily as needed for Flatulence.  krill-omega-3-dha-epa-lipids (KRILL OIL) 349-35-47-64 mg cap Take  by mouth. No current facility-administered medications for this visit. Review of Symptoms:    CONST  No weight change. No fever, chills, sweats    ENT No visual changes, URI sx, sore throat    CV  See HPI   RESP  No cough, or sputum, wheezing. Also see HPI   GI  No abdominal pain or change in bowel habits. No heartburn or dysphagia. No melena or rectal bleeding.   No dysuria, urgency, frequency, hematuria   MSKEL  No joint pain, swelling. No muscle pain. SKIN  No rash or lesions. NEURO  No headache, syncope, or seizure. No weakness, loss of sensation, or paresthesias. PSYCH  No low mood or depression  No anxiety. HE/LYMPH  No easy bruising, abnormal bleeding, or enlarged glands.         Physical ExamPhysical Exam:    Visit Vitals    /84 (BP 1 Location: Right arm, BP Patient Position: Sitting)    Pulse 87    Resp 18    Ht 5' 2\" (1.575 m)    Wt 170 lb (77.1 kg)    SpO2 94%    BMI 31.09 kg/m2     Gen: NAD  HEENT:  PERRL, throat clear  Neck: no mass or thyromegaly, no JVD   Heart:  sl irregular,Nl S1S2,  II/VI murmur, no gallop or rub.   Lungs:  Clear Pacer site intact  Abdomen:   Soft, non-tender, bowel sounds are active.   Extremities:  Mild edema, sterile blister R foot  Pulse: symmetric  Neuro: A&O times 3, WNL      Cardiographics    ECG: demand V pacing        Labs:   Lab Results   Component Value Date/Time    Sodium 141 05/11/2017 09:10 AM    Sodium 135 05/10/2017 04:14 AM    Sodium 135 05/09/2017 03:29 PM    Sodium 138 04/24/2017 08:15 AM    Sodium 139 04/23/2017 08:15 AM    Potassium 5.3 05/11/2017 09:10 AM    Potassium 5.8 05/10/2017 04:14 AM    Potassium 6.0 05/09/2017 03:29 PM    Potassium 3.9 04/24/2017 08:15 AM    Potassium 4.5 04/23/2017 08:15 AM    Chloride 113 05/11/2017 09:10 AM    Chloride 104 05/10/2017 04:14 AM    Chloride 102 05/09/2017 03:29 PM    Chloride 99 04/24/2017 08:15 AM    Chloride 101 04/23/2017 08:15 AM    CO2 20 05/11/2017 09:10 AM    CO2 23 05/10/2017 04:14 AM    CO2 22 05/09/2017 03:29 PM    CO2 33 04/24/2017 08:15 AM    CO2 29 04/23/2017 08:15 AM    Anion gap 8 05/11/2017 09:10 AM    Anion gap 8 05/10/2017 04:14 AM    Anion gap 11 05/09/2017 03:29 PM    Anion gap 6 04/24/2017 08:15 AM    Anion gap 9 04/23/2017 08:15 AM    Glucose 110 05/11/2017 09:10 AM    Glucose 87 05/10/2017 04:14 AM    Glucose 74 05/09/2017 03:29 PM    Glucose 128 04/24/2017 08:15 AM    Glucose 190 04/23/2017 08:15 AM    BUN 45 05/11/2017 09:10 AM    BUN 63 05/10/2017 04:14 AM    BUN 72 05/09/2017 03:29 PM    BUN 35 04/24/2017 08:15 AM    BUN 37 04/23/2017 08:15 AM    Creatinine 1.86 05/11/2017 09:10 AM    Creatinine 2.91 05/10/2017 04:14 AM    Creatinine 3.76 05/09/2017 03:29 PM    Creatinine 1.13 04/24/2017 08:15 AM    Creatinine 1.14 04/23/2017 08:15 AM    BUN/Creatinine ratio 24 05/11/2017 09:10 AM    BUN/Creatinine ratio 22 05/10/2017 04:14 AM    BUN/Creatinine ratio 19 05/09/2017 03:29 PM    BUN/Creatinine ratio 31 04/24/2017 08:15 AM    BUN/Creatinine ratio 32 04/23/2017 08:15 AM    GFR est AA 31 05/11/2017 09:10 AM    GFR est AA 18 05/10/2017 04:14 AM    GFR est AA 14 05/09/2017 03:29 PM    GFR est AA 55 04/24/2017 08:15 AM    GFR est AA 54 04/23/2017 08:15 AM    GFR est non-AA 25 05/11/2017 09:10 AM    GFR est non-AA 15 05/10/2017 04:14 AM    GFR est non-AA 11 05/09/2017 03:29 PM    GFR est non-AA 45 04/24/2017 08:15 AM    GFR est non-AA 45 04/23/2017 08:15 AM    Calcium 9.1 05/11/2017 09:10 AM    Calcium 9.2 05/10/2017 04:14 AM    Calcium 9.1 05/09/2017 03:29 PM    Calcium 9.1 04/24/2017 08:15 AM    Calcium 8.7 04/23/2017 08:15 AM    Bilirubin, total 0.4 05/11/2017 09:10 AM    Bilirubin, total 0.6 05/10/2017 04:14 AM    Bilirubin, total 0.5 05/09/2017 03:29 PM    Bilirubin, total 0.7 04/19/2017 04:00 PM    Bilirubin, total 0.5 04/14/2017 08:41 AM    AST (SGOT) 12 05/11/2017 09:10 AM    AST (SGOT) 11 05/10/2017 04:14 AM    AST (SGOT) 6 05/09/2017 03:29 PM    AST (SGOT) 54 04/19/2017 04:00 PM    AST (SGOT) 12 04/14/2017 08:41 AM    Alk. phosphatase 75 05/11/2017 09:10 AM    Alk. phosphatase 73 05/10/2017 04:14 AM    Alk. phosphatase 71 05/09/2017 03:29 PM    Alk. phosphatase 100 04/19/2017 04:00 PM    Alk.  phosphatase 47 04/14/2017 08:41 AM    Protein, total 5.8 05/11/2017 09:10 AM    Protein, total 6.0 05/10/2017 04:14 AM    Protein, total 5.9 05/09/2017 03:29 PM    Protein, total 6.7 04/19/2017 04:00 PM    Protein, total 6.3 04/14/2017 08:41 AM    Albumin 2.6 05/11/2017 09:10 AM    Albumin 2.7 05/10/2017 04:14 AM    Albumin 2.8 05/09/2017 03:29 PM    Albumin 3.4 04/19/2017 04:00 PM    Albumin 4.2 04/14/2017 08:41 AM    Globulin 3.2 05/11/2017 09:10 AM    Globulin 3.3 05/10/2017 04:14 AM    Globulin 3.1 05/09/2017 03:29 PM    Globulin 3.3 04/19/2017 04:00 PM    A-G Ratio 0.8 05/11/2017 09:10 AM    A-G Ratio 0.8 05/10/2017 04:14 AM    A-G Ratio 0.9 05/09/2017 03:29 PM    A-G Ratio 1.0 04/19/2017 04:00 PM    A-G Ratio 2.0 04/14/2017 08:41 AM    ALT (SGPT) 11 05/11/2017 09:10 AM    ALT (SGPT) 9 05/10/2017 04:14 AM    ALT (SGPT) 10 05/09/2017 03:29 PM    ALT (SGPT) 67 04/19/2017 04:00 PM    ALT (SGPT) 8 04/14/2017 08:41 AM     Lab Results   Component Value Date/Time    CK 20 05/09/2017 03:29 PM     Lab Results   Component Value Date/Time    Cholesterol, total 153 04/14/2017 08:41 AM    Cholesterol, total 169 07/21/2016 08:54 AM    Cholesterol, total 182 02/18/2016 10:21 AM    Cholesterol, total 182 09/10/2015 09:54 AM    Cholesterol, total 165 03/12/2015 10:22 AM    HDL Cholesterol 77 04/14/2017 08:41 AM    HDL Cholesterol 74 07/21/2016 08:54 AM    HDL Cholesterol 84 02/18/2016 10:21 AM    HDL Cholesterol 81 09/10/2015 09:54 AM    HDL Cholesterol 64 03/12/2015 10:22 AM    LDL, calculated 59 04/14/2017 08:41 AM    LDL, calculated 80 07/21/2016 08:54 AM    LDL, calculated 83 02/18/2016 10:21 AM    LDL, calculated 86 09/10/2015 09:54 AM    LDL, calculated 76 03/12/2015 10:22 AM    Triglyceride 87 04/14/2017 08:41 AM    Triglyceride 75 07/21/2016 08:54 AM    Triglyceride 77 02/18/2016 10:21 AM    Triglyceride 76 09/10/2015 09:54 AM    Triglyceride 126 03/12/2015 10:22 AM     No results found for this or any previous visit.     Assessment:         Patient Active Problem List    Diagnosis Date Noted    S/P cardiac pacemaker procedure 05/11/2017    Acute renal failure ([facility] 05/11/2017    Sick sinus syndrome ([facility] 05/09/2017    Complete heart block ([facility] 05/09/2017    Mitral stenosis 04/21/2017    Pulmonary hypertension ([facility] 04/21/2017    Aortic stenosis, moderate 04/21/2017    Iron deficiency anemia 04/20/2017    Osteoporosis 62/51/5987    Diastolic CHF, acute on chronic ([facility] 04/19/2017    Bradyarrhythmia 04/19/2017    Bronchitis 04/19/2017    Advance care planning 01/17/2017    Neuropathy 10/11/2016    Gastroesophageal reflux disease 10/11/2016    Osteoarthritis 09/10/2015    PAD (peripheral artery disease) (HCC)     Anxiety     Elevated lipids     Asthma     Hypertension     Chronic pain       S/p PPM placement. Some LE edema and small blister R foot--seen in ER. Still at [facility] with planned d/c today. F/u planned with PCP next week. Plan:     Add lasix 20mg qam  Continue other meds  Elevate legs  F/u next week with PCP as planned  F/u with Dr. Joel Ramírez 2 mos as planned--PPM check and f/u  Continue current care and f/u in 6 months, sooner prn  .     Italia Sarabia MD

## 2017-06-01 NOTE — PATIENT INSTRUCTIONS
Can get the pacemaker site wet but DO NOT scrub.    NO lifting greater than 10 lbs for another week (date written 6/1/17)  NO lifting greater than 20-30 pounds with both arms until after your 3 month appointment with Dr. Gabe Conte.  Linda Butler will be called regarding the 3 month appointment with Dr. Gabe Conte.

## 2017-06-01 NOTE — MR AVS SNAPSHOT
Visit Information Date & Time Provider Department Dept. Phone Encounter #  
 6/1/2017 11:40 AM Ashli Scott, [facility]  Your Appointments 6/6/2017 10:20 AM  
Any with Kip Hernandez MD  
[facility] ([address]) Appt Note: [facility] June 1 (Shazia scheduled the apt) 5/23/17 [facility] [phone] [phone]  
[address] [address]  
  
    
 7/14/2017  8:00 AM  
Any with Kip Hernandez MD  
[address] [facility] ([address]) Appt Note: 3 month f.up cp$0 4/14/17 [facility] [address]  
[phone] Upcoming Health Maintenance Date Due DTaP/Tdap/Td series (1 - Tdap) 11/24/2015 INFLUENZA AGE 9 TO ADULT 8/1/2017 MEDICARE YEARLY EXAM 1/18/2018 GLAUCOMA SCREENING Q2Y 1/17/2019 Allergies as of 6/1/2017  Review Complete On: 6/1/2017 By: Ashli Scott MD  
  
 Severity Noted Reaction Type Reactions Amoxicillin  08/20/2013    Unknown (comments) Biaxin [Clarithromycin]  01/15/2014    Unknown (comments) Celebrex [Celecoxib]  09/24/2014    Other (comments) Blood in urine Indocin [Indomethacin]  01/15/2014    Unknown (comments) Zithromax [Azithromycin]  01/15/2014    Unknown (comments) Patient said she is allergic all Mycins Current Immunizations  Reviewed on 5/9/2017 Name Date Influenza High Dose Vaccine PF 10/11/2016, 10/8/2015 Influenza Vaccine 9/24/2014, 10/23/2013, 9/19/2012 Pneumococcal Conjugate (PCV-13) 10/11/2016 Pneumococcal Vaccine (Unspecified Type) 1/23/2009 Td 11/23/2015, 12/14/2008 Not reviewed this visit You Were Diagnosed With   
  
 Codes Comments Sick sinus syndrome ([facility]    -  Primary ICD-10-CM: I49.5 ICD-9-CM: 427.81   
 S/P cardiac pacemaker procedure     ICD-10-CM: Z95.0 ICD-9-CM: V45.01 Diastolic CHF, acute on chronic ([facility])     ICD-10-CM: I50.33 ICD-9-CM: 428.33, 428.0 Mitral valve stenosis, unspecified etiology     ICD-10-CM: I05.0 ICD-9-CM: 394.0 Aortic stenosis, moderate     ICD-10-CM: I35.0 ICD-9-CM: 424.1 Localized edema     ICD-10-CM: R60.0 ICD-9-CM: 782.3 PAD (peripheral artery disease) ([facility])     ICD-10-CM: I73.9 ICD-9-CM: 443. 9 Vitals BP Pulse Resp Height(growth percentile) Weight(growth percentile) SpO2  
 132/84 (BP 1 Location: Right arm, BP Patient Position: Sitting) 87 18 5' 2\" (1.575 m) 170 lb (77.1 kg) 94% BMI OB Status Smoking Status 31.09 kg/m2 Menopause Never Smoker Vitals History BMI and BSA Data Body Mass Index Body Surface Area 31.09 kg/m 2 1.84 m 2 Preferred Pharmacy Pharmacy Name Phone [facility] [address] [place] [address] Your Updated Medication List  
  
   
This list is accurate as of: 6/1/17 12:32 PM.  Always use your most recent med list.  
  
  
  
  
 acetaminophen 325 mg tablet Commonly known as:  TYLENOL Take 2 Tabs by mouth every four (4) hours as needed. * albuterol 90 mcg/actuation inhaler Commonly known as:  VENTOLIN HFA SHAKE WELL. INHALE 2 PUFFS (1 MINUTE APART) EVERY 4 HOURS AS NEEDED FOR COUGH OR WHEEZE * albuterol 2.5 mg /3 mL (0.083 %) nebulizer solution Commonly known as:  PROVENTIL VENTOLIN  
3 mL by Nebulization route every four (4) hours as needed for Wheezing. ALPRAZolam 0.25 mg tablet Commonly known as:  Lafkaya Brunerini Take 1 Tab by mouth three (3) times daily as needed for Anxiety. Max Daily Amount: 0.75 mg.  
  
 ascorbic acid (vitamin C) 500 mg tablet Commonly known as:  VITAMIN C Take  by mouth. aspirin 81 mg chewable tablet Take 1 Tab by mouth daily. Calcium Carbonate-Vit D3-Min 600 mg calcium- 400 unit Tab Take  by mouth two (2) times a day. cilostazol 100 mg tablet Commonly known as:  PLETAL One bid  
  
 citalopram 40 mg tablet Commonly known as:  CELEXA  
TAKE 1 TABLET BY MOUTH AT BEDTIME FOR DEPRESSION  
  
 docusate sodium 100 mg capsule Commonly known as:  Otto Cost Take 1 Cap by mouth two (2) times daily as needed for Constipation for up to 90 days. ferrous sulfate 325 mg (65 mg iron) EC tablet Commonly known as:  IRON Take one twice a day  
  
 furosemide 20 mg tablet Commonly known as:  LASIX Take 1 Tab by mouth daily. gabapentin 100 mg capsule Commonly known as:  NEURONTIN  
TAKE ONE CAPSULE BY MOUTH AT BEDTIME FOR NEUROPATHIC PAIN  
  
 gemfibrozil 600 mg tablet Commonly known as:  LOPID TAKE 1 TABLET BY MOUTH TWICE DAILY FOR CHOLESTROL HYDROcodone-acetaminophen 5-325 mg per tablet Commonly known as:  [address] Take 1 Tab by mouth every eight (8) hours as needed. Max Daily Amount: 3 Tabs. KRILL -90-07-50 mg Cap Generic drug:  krill-omega-3-dha-epa-lipids Take  by mouth.  
  
 loratadine 10 mg tablet Commonly known as:  Nusrat Phillip Take 1 Tab by mouth daily. magnesium hydroxide 400 mg/5 mL suspension Commonly known as:  MILK OF MAGNESIA Take 30 mL by mouth daily as needed for Constipation. MUCUS RELIEF 400 mg tablet Generic drug:  guaiFENesin Take  by mouth two (2) times a day.  
  
 naloxone 4 mg/actuation Spry 1 Spray by Nasal route as needed. omeprazole 20 mg capsule Commonly known as:  PRILOSEC  
TAKE ONE CAPSULE BY MOUTH DAILY FOR STOMACH  
  
 polyethylene glycol 17 gram packet Commonly known as:  Lang Gordon Take 1 Packet by mouth daily as needed. QVAR 40 mcg/actuation Dotted Block Generic drug:  beclomethasone Take 2 Puffs by inhalation two (2) times a day. senna 8.6 mg tablet Commonly known as:  [employer] Take 1 Tab by mouth daily. simethicone 125 mg capsule Commonly known as:  GAS-X  
 Take 125 mg by mouth three (3) times daily as needed for Flatulence. sucralfate 100 mg/mL suspension Commonly known as:  Win Basques Take 10 mL by mouth Before breakfast, lunch, and dinner. * Notice: This list has 2 medication(s) that are the same as other medications prescribed for you. Read the directions carefully, and ask your doctor or other care provider to review them with you. Prescriptions Sent to Pharmacy Refills  
 furosemide (LASIX) 20 mg tablet 5 Sig: Take 1 Tab by mouth daily. Class: Normal  
 Pharmacy: [facility] [address]  #: [phone] Route: Oral  
  
We Performed the Following EKG, RHYTHM STRIPS [id number] CPT(R)] Patient Instructions Can get the pacemaker site wet but DO NOT scrub. NO lifting greater than 20-30 pounds with both arms until after your 3 month appointment with Dr. Caceres Matty will be called regarding the 3 month appointment with Dr. Kelsey Broussard.  
 
  
Introducing [facility]! Lindy Bennett introduces [employer] patient portal. Now you can access parts of your medical record, email your doctor's office, and request medication refills online. 1. In your internet browser, go to [email or web address] 2. Click on the First Time User? Click Here link in the Sign In box. You will see the New Member Sign Up page. 3. Enter your [employer] Access Code exactly as it appears below. You will not need to use this code after youve completed the sign-up process. If you do not sign up before the expiration date, you must request a new code. · [employer] Access Code: [email or web address] Expires: 7/23/2017 10:17 AM 
 
4. Enter the last four digits of your Social Security Number (xxxx) and Date of Birth (mm/dd/yyyy) as indicated and click Submit. You will be taken to the next sign-up page. 5. Create a [employer] ID.  This will be your [employer] login ID and cannot be changed, so think of one that is secure and easy to remember. 6. Create a [employer] password. You can change your password at any time. 7. Enter your Password Reset Question and Answer. This can be used at a later time if you forget your password. 8. Enter your e-mail address. You will receive e-mail notification when new information is available in [address] 9. Click Sign Up. You can now view and download portions of your medical record. 10. Click the Download Summary menu link to download a portable copy of your medical information. If you have questions, please visit the Frequently Asked Questions section of the [employer] website. Remember, [employer] is NOT to be used for urgent needs. For medical emergencies, dial 911. Now available from your iPhone and Android! Please provide this summary of care documentation to your next provider. Your primary care clinician is listed as Janie Stringer. If you have any questions after today's visit, please call [phone].

## 2017-06-02 ENCOUNTER — TELEPHONE (OUTPATIENT)
Dept: CARDIOLOGY CLINIC | Age: 82
End: 2017-06-02

## 2017-06-02 ENCOUNTER — HOME HEALTH ADMISSION (OUTPATIENT)
Dept: HOME HEALTH SERVICES | Facility: HOME HEALTH | Age: 82
End: 2017-06-02
Payer: MEDICARE

## 2017-06-02 DIAGNOSIS — M17.0 OSTEOARTHRITIS OF BOTH KNEES, UNSPECIFIED OSTEOARTHRITIS TYPE: ICD-10-CM

## 2017-06-02 RX ORDER — HYDROCODONE BITARTRATE AND ACETAMINOPHEN 5; 325 MG/1; MG/1
1 TABLET ORAL
Qty: 90 TAB | Refills: 0 | Status: SHIPPED | OUTPATIENT
Start: 2017-06-02

## 2017-06-02 NOTE — TELEPHONE ENCOUNTER
Pt has f/u appointment from being d/c'd from [facility]. She has been there for 2 mo. Mr. Kayy Dutton comes into office to ask for her hydrocodone refill, stating she will not have enough medication to get ot her 6/6/2017 appointment. Requested Prescriptions     Pending Prescriptions Disp Refills    HYDROcodone-acetaminophen (NORCO) 5-325 mg per tablet 90 Tab 0     Sig: Take 1 Tab by mouth every eight (8) hours as needed. Max Daily Amount: 3 Tabs.

## 2017-06-02 NOTE — TELEPHONE ENCOUNTER
No answer on either number provided, left message to call and schedule a 3 month post op appt with Dr. Iqra Gayle and a 3 month post op PM check at the beginning of August 2017.

## 2017-06-03 ENCOUNTER — HOME CARE VISIT (OUTPATIENT)
Dept: SCHEDULING | Facility: HOME HEALTH | Age: 82
End: 2017-06-03
Payer: MEDICARE

## 2017-06-03 PROCEDURE — 3331090002 HH PPS REVENUE DEBIT

## 2017-06-03 PROCEDURE — 400013 HH SOC

## 2017-06-03 PROCEDURE — 3331090001 HH PPS REVENUE CREDIT

## 2017-06-03 PROCEDURE — G0299 HHS/HOSPICE OF RN EA 15 MIN: HCPCS

## 2017-06-03 NOTE — Clinical Note
Mrs. Malia Brown was admitted to home health for nursing, P.T., O.T. 
I would like to add these services, may I write orders for [address] and MSW please? The MSW can help her get meals on wheels & assess for other community services. Also I am going to get the CWON to assess her feet because of the blistering & edema, I think she needs some compression to control the edema which would control the blistering but Mrs. Posadas did not want anything applied to blistering on our admission visit, she wants to wait to talk to you on Tuesday, I am hoping ON can see her Friday if she has an opening. Her meds were very disorganized with multiple duplicates, she has been managing her own meds & has not been taking correctly. I have updated her med list so please review. She was still taking the Lovastatin which was discontinued 5/9/17 & Diovan which was discontinued 5/11/17 (during hospitalization). Also is she suppose to be taking Aspirin 81mg daily? Her family needs to be instructed to help supervise meds.

## 2017-06-03 NOTE — Clinical Note
I WOULD LIKE TO GET THIS PATIENT ON [facility] SCHEDULE WHEN OPENING AVAILABLE. RE:BLISTERING/EDEMA TO BLE 
 
THANKS.

## 2017-06-03 NOTE — Clinical Note
Level SEVERE RISK adverse reactions  noted for the following:  
Drug-Drug: citalopram and omeprazole Concurrent use of an agent that inhibits CYP P-450-2C19 may result in elevated levels of and toxicity from citalopram, including prolongation of the QTc interval. Prolongation of the QT interval may result in life-threatening arrhythmias, including torsades de pointes.(1,2) Details Severe Interaction Drug-Drug: cilostazol and citalopram 
The use of citalopram in patients maintained on agents that prolong the QTc interval may result in potentially life-threatening cardiac arrhythmias, including torsades de pointes. (1-3)

## 2017-06-04 ENCOUNTER — HOME CARE VISIT (OUTPATIENT)
Dept: HOME HEALTH SERVICES | Facility: HOME HEALTH | Age: 82
End: 2017-06-04
Payer: MEDICARE

## 2017-06-04 VITALS — OXYGEN SATURATION: 90 % | HEART RATE: 90 BPM | RESPIRATION RATE: 28 BRPM | TEMPERATURE: 99.9 F

## 2017-06-04 VITALS
WEIGHT: 170 LBS | RESPIRATION RATE: 22 BRPM | DIASTOLIC BLOOD PRESSURE: 88 MMHG | BODY MASS INDEX: 31.09 KG/M2 | OXYGEN SATURATION: 95 % | SYSTOLIC BLOOD PRESSURE: 142 MMHG | HEART RATE: 74 BPM | TEMPERATURE: 98.9 F

## 2017-06-04 PROBLEM — J90 PLEURAL EFFUSION, BILATERAL: Status: ACTIVE | Noted: 2017-06-04

## 2017-06-04 PROBLEM — I05.0 MITRAL STENOSIS: Chronic | Status: ACTIVE | Noted: 2017-04-21

## 2017-06-04 PROBLEM — I27.20 PULMONARY HYPERTENSION (HCC): Chronic | Status: ACTIVE | Noted: 2017-04-21

## 2017-06-04 PROBLEM — R73.03 PREDIABETES: Chronic | Status: ACTIVE | Noted: 2017-06-04

## 2017-06-04 PROBLEM — I35.0 AORTIC STENOSIS, MODERATE: Chronic | Status: ACTIVE | Noted: 2017-04-21

## 2017-06-04 PROBLEM — Z95.0 S/P CARDIAC PACEMAKER PROCEDURE: Chronic | Status: ACTIVE | Noted: 2017-05-11

## 2017-06-04 PROBLEM — I49.8 BRADYARRHYTHMIA: Status: RESOLVED | Noted: 2017-04-19 | Resolved: 2017-06-04

## 2017-06-04 PROBLEM — J96.90 RESPIRATORY FAILURE (HCC): Status: ACTIVE | Noted: 2017-06-04

## 2017-06-04 PROBLEM — I50.33 ACUTE ON CHRONIC DIASTOLIC (CONGESTIVE) HEART FAILURE (HCC): Status: ACTIVE | Noted: 2017-06-04

## 2017-06-04 PROCEDURE — 3331090001 HH PPS REVENUE CREDIT

## 2017-06-04 PROCEDURE — G0299 HHS/HOSPICE OF RN EA 15 MIN: HCPCS

## 2017-06-04 PROCEDURE — 3331090003 HH PPS REVENUE ADJ

## 2017-06-04 PROCEDURE — 3331090002 HH PPS REVENUE DEBIT

## 2017-06-05 ENCOUNTER — HOME CARE VISIT (OUTPATIENT)
Dept: HOME HEALTH SERVICES | Facility: HOME HEALTH | Age: 82
End: 2017-06-05
Payer: MEDICARE

## 2017-06-05 ENCOUNTER — HOME CARE VISIT (OUTPATIENT)
Dept: SCHEDULING | Facility: HOME HEALTH | Age: 82
End: 2017-06-05
Payer: MEDICARE

## 2017-06-05 PROCEDURE — 3331090002 HH PPS REVENUE DEBIT

## 2017-06-05 PROCEDURE — 3331090001 HH PPS REVENUE CREDIT

## 2017-06-06 PROCEDURE — 3331090001 HH PPS REVENUE CREDIT

## 2017-06-06 PROCEDURE — 3331090002 HH PPS REVENUE DEBIT

## 2017-06-07 PROCEDURE — 3331090001 HH PPS REVENUE CREDIT

## 2017-06-07 PROCEDURE — 3331090002 HH PPS REVENUE DEBIT

## 2017-06-08 PROCEDURE — 3331090001 HH PPS REVENUE CREDIT

## 2017-06-08 PROCEDURE — 3331090002 HH PPS REVENUE DEBIT

## 2017-06-09 PROCEDURE — 3331090001 HH PPS REVENUE CREDIT

## 2017-06-09 PROCEDURE — 3331090002 HH PPS REVENUE DEBIT

## 2017-06-10 PROCEDURE — 3331090001 HH PPS REVENUE CREDIT

## 2017-06-10 PROCEDURE — 3331090002 HH PPS REVENUE DEBIT

## 2017-06-11 PROCEDURE — 3331090001 HH PPS REVENUE CREDIT

## 2017-06-11 PROCEDURE — 3331090002 HH PPS REVENUE DEBIT

## 2017-06-12 PROCEDURE — 3331090002 HH PPS REVENUE DEBIT

## 2017-06-12 PROCEDURE — 3331090001 HH PPS REVENUE CREDIT

## 2017-06-13 PROCEDURE — 3331090001 HH PPS REVENUE CREDIT

## 2017-06-13 PROCEDURE — 3331090002 HH PPS REVENUE DEBIT

## 2017-06-14 PROCEDURE — 3331090001 HH PPS REVENUE CREDIT

## 2017-06-14 PROCEDURE — 3331090002 HH PPS REVENUE DEBIT

## 2017-06-15 PROCEDURE — 3331090001 HH PPS REVENUE CREDIT

## 2017-06-15 PROCEDURE — 3331090002 HH PPS REVENUE DEBIT

## 2017-06-16 PROCEDURE — 3331090001 HH PPS REVENUE CREDIT

## 2017-06-16 PROCEDURE — 3331090002 HH PPS REVENUE DEBIT

## 2017-06-17 PROCEDURE — 3331090002 HH PPS REVENUE DEBIT

## 2017-06-17 PROCEDURE — 3331090001 HH PPS REVENUE CREDIT

## 2017-06-18 PROCEDURE — 3331090001 HH PPS REVENUE CREDIT

## 2017-06-18 PROCEDURE — 3331090002 HH PPS REVENUE DEBIT

## 2017-06-19 PROCEDURE — 3331090002 HH PPS REVENUE DEBIT

## 2017-06-19 PROCEDURE — 3331090001 HH PPS REVENUE CREDIT

## 2017-06-20 PROCEDURE — 3331090001 HH PPS REVENUE CREDIT

## 2017-06-20 PROCEDURE — 3331090002 HH PPS REVENUE DEBIT

## 2017-06-21 PROCEDURE — 3331090002 HH PPS REVENUE DEBIT

## 2017-06-21 PROCEDURE — 3331090001 HH PPS REVENUE CREDIT

## 2017-06-22 PROCEDURE — 3331090002 HH PPS REVENUE DEBIT

## 2017-06-22 PROCEDURE — 3331090001 HH PPS REVENUE CREDIT

## 2017-06-23 PROCEDURE — 3331090001 HH PPS REVENUE CREDIT

## 2017-06-23 PROCEDURE — 3331090002 HH PPS REVENUE DEBIT

## 2017-06-24 PROCEDURE — 3331090002 HH PPS REVENUE DEBIT

## 2017-06-24 PROCEDURE — 3331090001 HH PPS REVENUE CREDIT

## 2017-06-25 PROCEDURE — 3331090001 HH PPS REVENUE CREDIT

## 2017-06-25 PROCEDURE — 3331090002 HH PPS REVENUE DEBIT

## 2017-06-26 PROCEDURE — 3331090002 HH PPS REVENUE DEBIT

## 2017-06-26 PROCEDURE — 3331090001 HH PPS REVENUE CREDIT

## 2017-06-27 PROCEDURE — 3331090001 HH PPS REVENUE CREDIT

## 2017-06-27 PROCEDURE — 3331090002 HH PPS REVENUE DEBIT

## 2017-06-28 PROCEDURE — 3331090002 HH PPS REVENUE DEBIT

## 2017-06-28 PROCEDURE — 3331090001 HH PPS REVENUE CREDIT

## 2017-06-29 PROCEDURE — 3331090001 HH PPS REVENUE CREDIT

## 2017-06-29 PROCEDURE — 3331090002 HH PPS REVENUE DEBIT

## 2017-06-30 PROCEDURE — 3331090002 HH PPS REVENUE DEBIT

## 2017-06-30 PROCEDURE — 3331090001 HH PPS REVENUE CREDIT

## 2017-07-01 PROCEDURE — 3331090001 HH PPS REVENUE CREDIT

## 2017-07-01 PROCEDURE — 3331090002 HH PPS REVENUE DEBIT

## 2017-07-02 PROCEDURE — 3331090001 HH PPS REVENUE CREDIT

## 2017-07-02 PROCEDURE — 3331090002 HH PPS REVENUE DEBIT

## 2017-07-03 PROCEDURE — 3331090001 HH PPS REVENUE CREDIT

## 2017-07-03 PROCEDURE — 3331090002 HH PPS REVENUE DEBIT

## 2017-07-04 PROCEDURE — 3331090001 HH PPS REVENUE CREDIT

## 2017-07-04 PROCEDURE — 3331090002 HH PPS REVENUE DEBIT

## 2017-07-05 PROCEDURE — 3331090002 HH PPS REVENUE DEBIT

## 2017-07-05 PROCEDURE — 3331090001 HH PPS REVENUE CREDIT

## 2017-07-06 PROCEDURE — 3331090002 HH PPS REVENUE DEBIT

## 2017-07-06 PROCEDURE — 3331090001 HH PPS REVENUE CREDIT

## 2017-07-07 PROCEDURE — 3331090001 HH PPS REVENUE CREDIT

## 2017-07-07 PROCEDURE — 3331090002 HH PPS REVENUE DEBIT

## 2017-07-08 PROCEDURE — 3331090002 HH PPS REVENUE DEBIT

## 2017-07-08 PROCEDURE — 3331090001 HH PPS REVENUE CREDIT

## 2017-07-09 PROCEDURE — 3331090002 HH PPS REVENUE DEBIT

## 2017-07-09 PROCEDURE — 3331090001 HH PPS REVENUE CREDIT

## 2017-07-10 PROCEDURE — 3331090002 HH PPS REVENUE DEBIT

## 2017-07-10 PROCEDURE — 3331090001 HH PPS REVENUE CREDIT

## 2017-07-11 PROCEDURE — 3331090002 HH PPS REVENUE DEBIT

## 2017-07-11 PROCEDURE — 3331090001 HH PPS REVENUE CREDIT

## 2017-07-12 PROCEDURE — 3331090001 HH PPS REVENUE CREDIT

## 2017-07-12 PROCEDURE — 3331090002 HH PPS REVENUE DEBIT

## 2017-07-13 PROCEDURE — 3331090002 HH PPS REVENUE DEBIT

## 2017-07-13 PROCEDURE — 3331090001 HH PPS REVENUE CREDIT

## 2017-07-14 PROCEDURE — 3331090002 HH PPS REVENUE DEBIT

## 2017-07-14 PROCEDURE — 3331090001 HH PPS REVENUE CREDIT

## 2017-07-15 PROCEDURE — 3331090001 HH PPS REVENUE CREDIT

## 2017-07-15 PROCEDURE — 3331090002 HH PPS REVENUE DEBIT

## 2017-07-16 PROCEDURE — 3331090002 HH PPS REVENUE DEBIT

## 2017-07-16 PROCEDURE — 3331090001 HH PPS REVENUE CREDIT

## 2017-07-17 ENCOUNTER — HOME CARE VISIT (OUTPATIENT)
Dept: HOME HEALTH SERVICES | Facility: HOME HEALTH | Age: 82
End: 2017-07-17
Payer: MEDICARE

## 2017-07-17 PROCEDURE — 3331090001 HH PPS REVENUE CREDIT

## 2017-07-17 PROCEDURE — 3331090002 HH PPS REVENUE DEBIT

## 2017-07-18 PROCEDURE — 3331090002 HH PPS REVENUE DEBIT

## 2017-07-18 PROCEDURE — 3331090001 HH PPS REVENUE CREDIT

## 2017-07-19 PROCEDURE — 3331090002 HH PPS REVENUE DEBIT

## 2017-07-19 PROCEDURE — 3331090001 HH PPS REVENUE CREDIT

## 2017-07-20 PROCEDURE — 3331090001 HH PPS REVENUE CREDIT

## 2017-07-20 PROCEDURE — 3331090002 HH PPS REVENUE DEBIT

## 2017-07-21 PROCEDURE — 3331090001 HH PPS REVENUE CREDIT

## 2017-07-21 PROCEDURE — 3331090002 HH PPS REVENUE DEBIT

## 2017-07-22 PROCEDURE — 3331090002 HH PPS REVENUE DEBIT

## 2017-07-22 PROCEDURE — 3331090001 HH PPS REVENUE CREDIT

## 2017-07-23 PROCEDURE — 3331090002 HH PPS REVENUE DEBIT

## 2017-07-23 PROCEDURE — 3331090001 HH PPS REVENUE CREDIT

## 2017-07-24 PROCEDURE — 3331090002 HH PPS REVENUE DEBIT

## 2017-07-24 PROCEDURE — 3331090001 HH PPS REVENUE CREDIT

## 2017-07-25 PROCEDURE — 3331090002 HH PPS REVENUE DEBIT

## 2017-07-25 PROCEDURE — 3331090001 HH PPS REVENUE CREDIT

## 2017-07-26 PROCEDURE — 3331090001 HH PPS REVENUE CREDIT

## 2017-07-26 PROCEDURE — 3331090002 HH PPS REVENUE DEBIT

## 2017-07-27 PROCEDURE — 3331090001 HH PPS REVENUE CREDIT

## 2017-07-27 PROCEDURE — 3331090002 HH PPS REVENUE DEBIT

## 2017-07-28 PROCEDURE — 3331090002 HH PPS REVENUE DEBIT

## 2017-07-28 PROCEDURE — 3331090001 HH PPS REVENUE CREDIT

## 2017-07-29 PROCEDURE — 3331090002 HH PPS REVENUE DEBIT

## 2017-07-29 PROCEDURE — 3331090001 HH PPS REVENUE CREDIT

## 2017-07-30 PROCEDURE — 3331090001 HH PPS REVENUE CREDIT

## 2017-07-30 PROCEDURE — 3331090002 HH PPS REVENUE DEBIT

## 2017-07-31 PROCEDURE — 3331090001 HH PPS REVENUE CREDIT

## 2017-07-31 PROCEDURE — 3331090002 HH PPS REVENUE DEBIT

## 2017-08-01 PROCEDURE — 3331090002 HH PPS REVENUE DEBIT

## 2017-08-01 PROCEDURE — 3331090003 HH PPS REVENUE ADJ

## 2017-08-01 PROCEDURE — 3331090001 HH PPS REVENUE CREDIT

## 2017-08-03 ENCOUNTER — OFFICE VISIT (OUTPATIENT)
Dept: CARDIOLOGY CLINIC | Age: 82
End: 2017-08-03

## 2017-08-03 VITALS
OXYGEN SATURATION: 94 % | RESPIRATION RATE: 16 BRPM | HEIGHT: 62 IN | WEIGHT: 154.4 LBS | HEART RATE: 75 BPM | DIASTOLIC BLOOD PRESSURE: 82 MMHG | BODY MASS INDEX: 28.41 KG/M2 | SYSTOLIC BLOOD PRESSURE: 116 MMHG

## 2017-08-03 DIAGNOSIS — I44.2 COMPLETE HEART BLOCK (HCC): ICD-10-CM

## 2017-08-03 DIAGNOSIS — I50.32 CHRONIC DIASTOLIC CHF (CONGESTIVE HEART FAILURE) (HCC): Chronic | ICD-10-CM

## 2017-08-03 DIAGNOSIS — I10 ESSENTIAL HYPERTENSION: Chronic | ICD-10-CM

## 2017-08-03 DIAGNOSIS — I49.5 SICK SINUS SYNDROME (HCC): Primary | ICD-10-CM

## 2017-08-03 DIAGNOSIS — I35.0 AORTIC STENOSIS, MODERATE: Chronic | ICD-10-CM

## 2017-08-03 DIAGNOSIS — Z95.0 S/P CARDIAC PACEMAKER PROCEDURE: Primary | Chronic | ICD-10-CM

## 2017-08-03 DIAGNOSIS — I50.33 DIASTOLIC CHF, ACUTE ON CHRONIC (HCC): ICD-10-CM

## 2017-08-03 RX ORDER — DOCUSATE SODIUM 100 MG/1
100 CAPSULE, LIQUID FILLED ORAL 2 TIMES DAILY
COMMUNITY

## 2017-08-03 RX ORDER — NYSTATIN 100000 [USP'U]/G
POWDER TOPICAL 4 TIMES DAILY
COMMUNITY
End: 2017-12-01 | Stop reason: ALTCHOICE

## 2017-08-03 RX ORDER — MENTHOL AND ZINC OXIDE .44; 20.625 G/100G; G/100G
OINTMENT TOPICAL
COMMUNITY
End: 2017-12-01 | Stop reason: ALTCHOICE

## 2017-08-03 NOTE — PROGRESS NOTES
Subjective: Adelina Dillon is a 80 y.o. female is here for device follow up. She has shortness of breath at baseline, on chronic oxygen. The patient denies chest pain, orthopnea, PND, LE edema, palpitations, syncope, presyncope or fatigue.        Patient Active Problem List    Diagnosis Date Noted    Pleural effusion, bilateral 06/04/2017    Respiratory failure ([address] 06/04/2017    Acute on chronic diastolic (congestive) heart failure ([address] 06/04/2017    Prediabetes 06/04/2017    Chronic diastolic CHF (congestive heart failure) ([facility])     Anemia     S/P cardiac pacemaker procedure 05/11/2017    Acute renal failure ([address] 05/11/2017    Sick sinus syndrome ([address] 05/09/2017    Complete heart block ([address] 05/09/2017    Mitral stenosis 04/21/2017    Pulmonary hypertension ([address] 04/21/2017    Aortic stenosis, moderate 04/21/2017    Iron deficiency anemia 04/20/2017    Osteoporosis 20/48/8168    Diastolic CHF, acute on chronic ([address] 04/19/2017    Bronchitis 04/19/2017    Advance care planning 01/17/2017    Neuropathy ([address] 10/11/2016    Gastroesophageal reflux disease 10/11/2016    Osteoarthritis 09/10/2015    PAD (peripheral artery disease) ([facility])     Anxiety     Hyperlipidemia     Asthma     Hypertension     Chronic pain       Chapo Alcala MD  Past Medical History:   Diagnosis Date    Acute renal failure ([address] 5/11/2017    Anemia     Anxiety     Asthma     Breast cancer (HCC)     breast    Chronic diastolic CHF (congestive heart failure) ([facility])     Chronic pain     Elevated lipids     GERD (gastroesophageal reflux disease)     Heart murmur     Hypercholesterolemia     Hyperlipidemia     Hypertension     Leg cramps     Mitral stenosis 4/21/2017    Osteoporosis     PAD (peripheral artery disease) ([facility])     Pleural effusion, bilateral 6/4/2017    Prediabetes 6/4/2017    a1c 6.3 1 month ago    Respiratory failure ([address] 6/4/2017    Based on ER sats 88% on RA    S/P cardiac pacemaker procedure 5/11/2017 5/9/17 [employer] dual chamber pacemaker    Sick sinus syndrome [facility])       Past Surgical History:   Procedure Laterality Date    BREAST SURGERY PROCEDURE UNLISTED      HX CATARACT REMOVAL      HX COLONOSCOPY  06/07/2006    HX PACEMAKER PLACEMENT  2017     Allergies   Allergen Reactions    Doxycycline Unknown (comments)     WEAKNESS    Amoxicillin Unknown (comments)    Biaxin [Clarithromycin] Unknown (comments)    Celebrex [Celecoxib] Other (comments)     Blood in urine    Indocin [Indomethacin] Unknown (comments)    Lactose Diarrhea    Zithromax [Azithromycin] Unknown (comments)     Patient said she is allergic all Mycins      Family History   Problem Relation Age of Onset    Cancer Brother      kidney CA    negative for cardiac disease  Social History     Social History    Marital status:      Spouse name: N/A    Number of children: 1    Years of education: N/A     Social History Main Topics    Smoking status: Never Smoker    Smokeless tobacco: Never Used    Alcohol use No    Drug use: No    Sexual activity: Not Currently     Partners: Male     Other Topics Concern    None     Social History Narrative    Lives alone, her son lives at [facility] and visit/help her once in a week,     Current Outpatient Prescriptions   Medication Sig    docusate sodium (COLACE) 100 mg capsule Take 100 mg by mouth two (2) times a day.  Menthol-Zinc Oxide (RISAMINE) 0.44-20.6 % oint Apply  to affected area.  nystatin (NYSTOP) powder Apply  to affected area four (4) times daily.  albuterol-ipratropium (DUO-NEB) 2.5 mg-0.5 mg/3 ml nebu 3 mL by Nebulization route every four (4) hours as needed.  losartan (COZAAR) 25 mg tablet Take 1 Tab by mouth daily.  metoprolol succinate (TOPROL-XL) 50 mg XL tablet Take 1 Tab by mouth daily.  Indications: Chronic Heart Failure, hypertension    potassium chloride SR (KLOR-CON 10) 10 mEq tablet Take 1 Tab by mouth daily.  HYDROcodone-acetaminophen (NORCO) 5-325 mg per tablet Take 1 Tab by mouth every eight (8) hours as needed. Max Daily Amount: 3 Tabs.  furosemide (LASIX) 20 mg tablet Take 1 Tab by mouth daily.  beclomethasone (QVAR) 40 mcg/actuation aero Take 2 Puffs by inhalation two (2) times a day.  acetaminophen (TYLENOL) 325 mg tablet Take 2 Tabs by mouth every four (4) hours as needed.  aspirin 81 mg chewable tablet Take 1 Tab by mouth daily.  loratadine (CLARITIN) 10 mg tablet Take 1 Tab by mouth daily.  polyethylene glycol (MIRALAX) 17 gram packet Take 1 Packet by mouth daily as needed.  senna (SENOKOT) 8.6 mg tablet Take 1 Tab by mouth daily.  sucralfate (CARAFATE) 100 mg/mL suspension Take 10 mL by mouth Before breakfast, lunch, and dinner.  ferrous sulfate (IRON) 325 mg (65 mg iron) EC tablet Take one twice a day    cilostazol (PLETAL) 100 mg tablet One bid    gabapentin (NEURONTIN) 100 mg capsule TAKE ONE CAPSULE BY MOUTH AT BEDTIME FOR NEUROPATHIC PAIN    gemfibrozil (LOPID) 600 mg tablet TAKE 1 TABLET BY MOUTH TWICE DAILY FOR CHOLESTROL    omeprazole (PRILOSEC) 20 mg capsule TAKE ONE CAPSULE BY MOUTH DAILY FOR STOMACH    citalopram (CELEXA) 40 mg tablet TAKE 1 TABLET BY MOUTH AT BEDTIME FOR DEPRESSION    guaiFENesin (MUCUS RELIEF) 400 mg tablet Take 600 mg by mouth two (2) times a day.  POLYVINYL ALCOHOL/POVIDONE (MURINE OP) Administer 2 Drops to both eyes as needed (DRY EYES). No current facility-administered medications for this visit. Vitals:    08/03/17 1008   BP: 116/82   Pulse: 75   Resp: 16   SpO2: 94%   Weight: 154 lb 6.4 oz (70 kg)   Height: 5' 2\" (1.575 m)       I have reviewed the nurses notes, vitals, problem list, allergy list, medical history, family, social history and medications. Review of Symptoms:    General: Pt denies excessive weight gain or loss.  Pt is able to conduct ADL's  HEENT: Denies blurred vision, headaches, epistaxis and difficulty swallowing. Respiratory: +shortness of breath, Denies wheezing or stridor. Cardiovascular: Denies precordial pain, palpitations, edema or PND  Gastrointestinal: Denies poor appetite, indigestion, abdominal pain or blood in stool  Urinary: Denies dysuria, pyuria  Musculoskeletal: Denies pain or swelling from muscles or joints  Neurologic: Denies tremor, paresthesias, or sensory motor disturbance  Skin: Denies rash, itching or texture change. Psych: Denies depression      Physical Exam:      General: Well developed, in no acute distress. HEENT: Eyes - PERRL, no jvd  Heart:  Normal S1/S2 negative S3 or S4. Regular, no murmur, gallop or rub.   Respiratory: Clear bilaterally x 4, no wheezing or rales  Abdomen:   Soft, non-tender, bowel sounds are active.   Extremities:  No edema, normal cap refill, no cyanosis. Musculoskeletal: No clubbing  Neuro: A&Ox3, speech clear, gait stable. Skin: Skin color is normal. No rashes or lesions.  Non diaphoretic  Vascular: 2+ pulses symmetric in all extremities    Cardiographics    Ekg: v-pacing  BSC: 99%RVP, 19% AP  4 episodes of AF 1-18 seconds      Results for orders placed or performed during the hospital encounter of 06/04/17   EKG, 12 LEAD, INITIAL   Result Value Ref Range    Ventricular Rate 86 BPM    Atrial Rate 86 BPM    P-R Interval 198 ms    QRS Duration 166 ms    Q-T Interval 482 ms    QTC Calculation (Bezet) 576 ms    Calculated P Axis 5 degrees    Calculated R Axis -76 degrees    Calculated T Axis 80 degrees    Diagnosis       Atrial-sensed ventricular-paced rhythm with occasional premature ventricular   complexes  Abnormal ECG  When compared with ECG of 10-MAY-2017 04:58,  No significant change    Confirmed by Geneva Durán MD, --- ([id number]) on 6/4/2017 10:50:45 PM           Lab Results   Component Value Date/Time    WBC 6.2 06/07/2017 04:00 AM    HGB (POC) 10.6 02/18/2016 10:41 AM    HGB 10.1 06/07/2017 04:00 AM    HCT (POC) 35.3 02/18/2016 10:41 AM HCT 35.4 06/07/2017 04:00 AM    PLATELET 847 04/23/9180 04:00 AM    MCV 83.9 06/07/2017 04:00 AM      Lab Results   Component Value Date/Time    Sodium 138 06/07/2017 04:00 AM    Potassium 3.6 06/07/2017 04:00 AM    Chloride 101 06/07/2017 04:00 AM    CO2 33 06/07/2017 04:00 AM    Anion gap 4 06/07/2017 04:00 AM    Glucose 112 06/07/2017 04:00 AM    BUN 13 06/07/2017 04:00 AM    Creatinine 0.97 06/07/2017 04:00 AM    BUN/Creatinine ratio 13 06/07/2017 04:00 AM    GFR est AA >60 06/07/2017 04:00 AM    GFR est non-AA 54 06/07/2017 04:00 AM    Calcium 8.6 06/07/2017 04:00 AM    Bilirubin, total 0.6 06/04/2017 03:15 PM    AST (SGOT) 15 06/04/2017 03:15 PM    Alk. phosphatase 66 06/04/2017 03:15 PM    Protein, total 6.3 06/04/2017 03:15 PM    Albumin 3.1 06/04/2017 03:15 PM    Globulin 3.2 06/04/2017 03:15 PM    A-G Ratio 1.0 06/04/2017 03:15 PM    ALT (SGPT) 14 06/04/2017 03:15 PM         Assessment:     Assessment:        ICD-10-CM ICD-9-CM    1. Sick sinus syndrome ([facility]) I49.5 427.81 AMB POC EKG ROUTINE W/ 12 LEADS, INTER & REP   2. Chronic diastolic CHF (congestive heart failure) ([facility]) I50.32 428.32      428.0    3. Aortic stenosis, moderate I35.0 424.1    4. Essential hypertension I10 401.9      Orders Placed This Encounter    AMB POC EKG ROUTINE W/ 12 LEADS, INTER & REP     Order Specific Question:   Reason for Exam:     Answer:   routine    docusate sodium (COLACE) 100 mg capsule     Sig: Take 100 mg by mouth two (2) times a day.  Menthol-Zinc Oxide (RISAMINE) 0.44-20.6 % oint     Sig: Apply  to affected area.  nystatin (NYSTOP) powder     Sig: Apply  to affected area four (4) times daily. Plan:   Ms. Sara Diaz is here for device follow up. EKG demonstrates v-pacing. Device interrogation demonstrates normal functioning with brief episodes of AF. She denies cardiac complaints. Echocardiogram demonstrated normal LV function.  Continue per device clinic and follow up with Dr. Ruddy Rowell in one year.    Continue medical management for HTN, CHF, AS. Thank you for allowing me to participate in Mary Garza 's care.     HARPREET Segura MD, Fredy Singletary

## 2017-08-03 NOTE — MR AVS SNAPSHOT
Visit Information Date & Time Provider Department Dept. Phone Encounter #  
 8/3/2017 10:30 AM Car Lao, [address] [facility] [phone] [id number] Your Appointments 12/1/2017 10:00 AM  
ESTABLISHED PATIENT with Argelia Dunaway MD  
[address] [place] [facility] [facility]) Appt Note: 6 mo fu $0cp [address] [id number] [phone]  
  
   
 [address] [place] [address] Upcoming Health Maintenance Date Due DTaP/Tdap/Td series (1 - Tdap) 11/24/2015 COLONOSCOPY 6/7/2016 INFLUENZA AGE 9 TO ADULT 8/1/2017 MEDICARE YEARLY EXAM 1/18/2018 GLAUCOMA SCREENING Q2Y 1/17/2019 Allergies as of 8/3/2017  Review Complete On: 8/3/2017 By: Isael Ovalle NP Severity Noted Reaction Type Reactions Doxycycline High 06/03/2017    Unknown (comments) WEAKNESS Amoxicillin  08/20/2013    Unknown (comments) Biaxin [Clarithromycin]  01/15/2014    Unknown (comments) Celebrex [Celecoxib]  09/24/2014    Other (comments) Blood in urine Indocin [Indomethacin]  01/15/2014    Unknown (comments) Lactose  06/03/2017   Intolerance Diarrhea Zithromax [Azithromycin]  01/15/2014    Unknown (comments) Patient said she is allergic all Mycins Current Immunizations  Reviewed on 5/9/2017 Name Date Influenza High Dose Vaccine PF 10/11/2016, 10/8/2015 Influenza Vaccine 9/24/2014, 10/23/2013, 9/19/2012 Pneumococcal Conjugate (PCV-13) 10/11/2016 Pneumococcal Vaccine (Unspecified Type) 1/23/2009 Td 11/23/2015, 12/14/2008 Not reviewed this visit You Were Diagnosed With   
  
 Codes Comments Sick sinus syndrome ([facility]    -  Primary ICD-10-CM: I49.5 ICD-9-CM: 427.81 Chronic diastolic CHF (congestive heart failure) (HCC)     ICD-10-CM: I50.32 
ICD-9-CM: 428.32, 428.0 Aortic stenosis, moderate     ICD-10-CM: I35.0 ICD-9-CM: 424.1 Essential hypertension     ICD-10-CM: I10 
ICD-9-CM: 401.9 Vitals BP Pulse Resp Height(growth percentile) Weight(growth percentile) SpO2  
 116/82 (BP 1 Location: Right arm, BP Patient Position: Sitting) 75 16 5' 2\" (1.575 m) 154 lb 6.4 oz (70 kg) 94% BMI OB Status Smoking Status 28.24 kg/m2 Menopause Never Smoker Vitals History BMI and BSA Data Body Mass Index Body Surface Area  
 28.24 kg/m 2 1.75 m 2 Preferred Pharmacy Pharmacy Name Phone [facility] [address] [facility], [address] [place] [address] Your Updated Medication List  
  
   
This list is accurate as of: 8/3/17 11:08 AM.  Always use your most recent med list.  
  
  
  
  
 acetaminophen 325 mg tablet Commonly known as:  TYLENOL Take 2 Tabs by mouth every four (4) hours as needed. albuterol-ipratropium 2.5 mg-0.5 mg/3 ml Nebu Commonly known as:  DUO-NEB  
3 mL by Nebulization route every four (4) hours as needed. aspirin 81 mg chewable tablet Take 1 Tab by mouth daily. cilostazol 100 mg tablet Commonly known as:  PLETAL One bid  
  
 citalopram 40 mg tablet Commonly known as:  CELEXA  
TAKE 1 TABLET BY MOUTH AT BEDTIME FOR DEPRESSION  
  
 docusate sodium 100 mg capsule Commonly known as:  Ximena Second Take 100 mg by mouth two (2) times a day. ferrous sulfate 325 mg (65 mg iron) EC tablet Commonly known as:  IRON Take one twice a day  
  
 furosemide 20 mg tablet Commonly known as:  LASIX Take 1 Tab by mouth daily. gabapentin 100 mg capsule Commonly known as:  NEURONTIN  
TAKE ONE CAPSULE BY MOUTH AT BEDTIME FOR NEUROPATHIC PAIN  
  
 gemfibrozil 600 mg tablet Commonly known as:  LOPID TAKE 1 TABLET BY MOUTH TWICE DAILY FOR CHOLESTROL HYDROcodone-acetaminophen 5-325 mg per tablet Commonly known as:  Judah [place] Take 1 Tab by mouth every eight (8) hours as needed. Max Daily Amount: 3 Tabs.  
  
 loratadine 10 mg tablet Commonly known as:  Ching Nett Take 1 Tab by mouth daily. losartan 25 mg tablet Commonly known as:  COZAAR Take 1 Tab by mouth daily. metoprolol succinate 50 mg XL tablet Commonly known as:  TOPROL-XL Take 1 Tab by mouth daily. Indications: Chronic Heart Failure, hypertension MUCUS RELIEF 400 mg tablet Generic drug:  guaiFENesin Take 600 mg by mouth two (2) times a day. MURINE OP Administer 2 Drops to both eyes as needed (DRY EYES). NYSTOP powder Generic drug:  nystatin Apply  to affected area four (4) times daily. omeprazole 20 mg capsule Commonly known as:  PRILOSEC  
TAKE ONE CAPSULE BY MOUTH DAILY FOR STOMACH  
  
 polyethylene glycol 17 gram packet Commonly known as:  Manual Rouleau Take 1 Packet by mouth daily as needed. potassium chloride SR 10 mEq tablet Commonly known as:  KLOR-CON 10 Take 1 Tab by mouth daily. QVAR 40 mcg/actuation Compact Particle Acceleration Generic drug:  beclomethasone Take 2 Puffs by inhalation two (2) times a day. RISAMINE 0.44-20.6 % Oint Generic drug:  Menthol-Zinc Oxide Apply  to affected area. senna 8.6 mg tablet Commonly known as:  Peter Kiewdiane Bry Take 1 Tab by mouth daily. sucralfate 100 mg/mL suspension Commonly known as:  Danilo Conrad Take 10 mL by mouth Before breakfast, lunch, and dinner. We Performed the Following AMB POC EKG ROUTINE W/ 12 LEADS, INTER & REP [51257 CPT(R)] Introducing [facility]! Jonathonadean Saint introduces [employer] patient portal. Now you can access parts of your medical record, email your doctor's office, and request medication refills online. 1. In your internet browser, go to [email or web address] 2. Click on the First Time User? Click Here link in the Sign In box. You will see the New Member Sign Up page. 3. Enter your [employer] Access Code exactly as it appears below.  You will not need to use this code after youve completed the sign-up process. If you do not sign up before the expiration date, you must request a new code. · [employer] Access Code: [email or web address] Expires: 10/21/2017  9:36 PM 
 
4. Enter the last four digits of your Social Security Number (xxxx) and Date of Birth (mm/dd/yyyy) as indicated and click Submit. You will be taken to the next sign-up page. 5. Create a [employer]wall ID. This will be your [employer]wall login ID and cannot be changed, so think of one that is secure and easy to remember. 6. Create a [employer]wall password. You can change your password at any time. 7. Enter your Password Reset Question and Answer. This can be used at a later time if you forget your password. 8. Enter your e-mail address. You will receive e-mail notification when new information is available in [address] 9. Click Sign Up. You can now view and download portions of your medical record. 10. Click the Download Summary menu link to download a portable copy of your medical information. If you have questions, please visit the Frequently Asked Questions section of the [employer] website. Remember, [employer] is NOT to be used for urgent needs. For medical emergencies, dial 911. Now available from your iPhone and Android! Please provide this summary of care documentation to your next provider. Your primary care clinician is listed as Janie Stringer. If you have any questions after today's visit, please call [phone].

## 2017-12-01 ENCOUNTER — OFFICE VISIT (OUTPATIENT)
Dept: CARDIOLOGY CLINIC | Age: 82
End: 2017-12-01

## 2017-12-01 VITALS
HEIGHT: 62 IN | WEIGHT: 144 LBS | RESPIRATION RATE: 18 BRPM | OXYGEN SATURATION: 97 % | HEART RATE: 76 BPM | DIASTOLIC BLOOD PRESSURE: 64 MMHG | SYSTOLIC BLOOD PRESSURE: 110 MMHG | BODY MASS INDEX: 26.5 KG/M2

## 2017-12-01 DIAGNOSIS — R60.0 LOCALIZED EDEMA: ICD-10-CM

## 2017-12-01 DIAGNOSIS — I05.0 MITRAL VALVE STENOSIS, UNSPECIFIED ETIOLOGY: ICD-10-CM

## 2017-12-01 DIAGNOSIS — I35.0 AORTIC STENOSIS, MODERATE: ICD-10-CM

## 2017-12-01 DIAGNOSIS — I50.32 CHRONIC DIASTOLIC CHF (CONGESTIVE HEART FAILURE) (HCC): ICD-10-CM

## 2017-12-01 DIAGNOSIS — J41.0 SIMPLE CHRONIC BRONCHITIS (HCC): ICD-10-CM

## 2017-12-01 DIAGNOSIS — I27.20 PULMONARY HYPERTENSION (HCC): ICD-10-CM

## 2017-12-01 DIAGNOSIS — Z95.0 S/P CARDIAC PACEMAKER PROCEDURE: ICD-10-CM

## 2017-12-01 DIAGNOSIS — I49.5 SICK SINUS SYNDROME (HCC): Primary | ICD-10-CM

## 2017-12-01 DIAGNOSIS — I10 ESSENTIAL HYPERTENSION: ICD-10-CM

## 2017-12-01 DIAGNOSIS — I73.9 PAD (PERIPHERAL ARTERY DISEASE) (HCC): ICD-10-CM

## 2017-12-01 NOTE — PROGRESS NOTES
Verified patient with two patient identifiers. Medications reviewed/approved by Dr. Karina James. Verbal from Dr. Karina James to remove the medications that were deleted during the visit. Chief Complaint   Patient presents with    CHF     6 month follow up    Slow Heart Rate    Valvular Heart Disease    Hypertension     1. Have you been to the ER, urgent care clinic since your last visit? Hospitalized since your last visit? Yes, [facility]  2. Have you seen or consulted any other health care providers outside of the [address] [place] [address] since your last visit? Include any pap smears or colon screening.  no

## 2017-12-01 NOTE — PROGRESS NOTES
Scarlett Hilliard is a 80 y.o. female is here for routine f/u. Stable sx, GZUMÁN. No other CV sx or complaints. Residing at [facility]. Seen by Dr. Lissy Hidalgo in August for PPM f/u. Echo 4/17 with LVEF 55-60, mod MS, mod AS, pulm hypertension. The patient denies chest pain, orthopnea, PND, palpitations, syncope, presyncope.        Patient Active Problem List    Diagnosis Date Noted    Pleural effusion, bilateral 06/04/2017    Respiratory failure ([address] 06/04/2017    Acute on chronic diastolic (congestive) heart failure 06/04/2017    Prediabetes 06/04/2017    Chronic diastolic CHF (congestive heart failure) (HCC)     Anemia     S/P cardiac pacemaker procedure 05/11/2017    Acute renal failure ([address] 05/11/2017    Sick sinus syndrome ([address] 05/09/2017    Complete heart block ([address] 05/09/2017    Mitral stenosis 04/21/2017    Pulmonary hypertension 04/21/2017    Aortic stenosis, moderate 04/21/2017    Iron deficiency anemia 04/20/2017    Osteoporosis 34/43/5576    Diastolic CHF, acute on chronic ([address] 04/19/2017    Bronchitis 04/19/2017    Advance care planning 01/17/2017    Neuropathy 10/11/2016    Gastroesophageal reflux disease 10/11/2016    Osteoarthritis 09/10/2015    [facility]AD [facility]per[facility] artery disease[facility]     Anxiety     Hyperlipidemia     Asthma     Hypertension     Chronic pain       June Liliane Mcallister MD  Past Medical History:   Diagnosis Date    Acute renal failure ([address] 5/11/2017    Anemia     Anxiety     Asthma     Breast cancer (HCC)     breast    Chronic diastolic CHF (congestive heart failure) (HCC)     Chronic pain     Elevated lipids     GERD (gastroesophageal reflux disease)     Heart murmur     Hypercholesterolemia     Hyperlipidemia     Hypertension     Leg cramps     Mitral stenosis 4/21/2017    Osteoporosis     PAD (peripheral artery disease) ([facility])     Pleural effusion, bilateral 6/4/2017    Prediabetes 6/4/2017    a1c 6.3 1 month ago    Respiratory failure ([facility] 6/4/2017    Based on ER sats 88% on RA    S/P cardiac pacemaker procedure 5/11/2017 5/9/17 [employer] dual chamber pacemaker    Sick sinus syndrome [facility])       Past Surgical History:   Procedure Laterality Date    BREAST SURGERY PROCEDURE UNLISTED      HX CATARACT REMOVAL      HX COLONOSCOPY  06/07/2006    HX PACEMAKER PLACEMENT  2017     Allergies   Allergen Reactions    Doxycycline Unknown (comments)     WEAKNESS    Amoxicillin Unknown (comments)    Biaxin [Clarithromycin] Unknown (comments)    Celebrex [Celecoxib] Other (comments)     Blood in urine    Indocin [Indomethacin] Unknown (comments)    Lactose Diarrhea    Zithromax [Azithromycin] Unknown (comments)     Patient said she is allergic all Mycins      Family History   Problem Relation Age of Onset    Cancer Brother      kidney CA      Social History     Social History    Marital status:      Spouse name: N/A    Number of children: 1    Years of education: N/A     Occupational History    Not on file. Social History Main Topics    Smoking status: Never Smoker    Smokeless tobacco: Never Used    Alcohol use No    Drug use: No    Sexual activity: Not Currently     Partners: Male     Other Topics Concern    Not on file     Social History Narrative    Lives alone, her son lives at [place] and visit/help her once in a week,      Current Outpatient Prescriptions   Medication Sig    docusate sodium (COLACE) 100 mg capsule Take 100 mg by mouth two (2) times a day.  Menthol-Zinc Oxide (RISAMINE) 0.44-20.6 % oint Apply  to affected area.  nystatin (NYSTOP) powder Apply  to affected area four (4) times daily.  albuterol-ipratropium (DUO-NEB) 2.5 mg-0.5 mg/3 ml nebu 3 mL by Nebulization route every four (4) hours as needed.  losartan (COZAAR) 25 mg tablet Take 1 Tab by mouth daily.  metoprolol succinate (TOPROL-XL) 50 mg XL tablet Take 1 Tab by mouth daily.  Indications: Chronic Heart Failure, hypertension    potassium chloride SR (KLOR-CON 10) 10 mEq tablet Take 1 Tab by mouth daily.  POLYVINYL ALCOHOL/POVIDONE (MURINE OP) Administer 2 Drops to both eyes as needed (DRY EYES).  HYDROcodone-acetaminophen (NORCO) 5-325 mg per tablet Take 1 Tab by mouth every eight (8) hours as needed. Max Daily Amount: 3 Tabs.  furosemide (LASIX) 20 mg tablet Take 1 Tab by mouth daily.  beclomethasone (QVAR) 40 mcg/actuation aero Take 2 Puffs by inhalation two (2) times a day.  acetaminophen (TYLENOL) 325 mg tablet Take 2 Tabs by mouth every four (4) hours as needed.  aspirin 81 mg chewable tablet Take 1 Tab by mouth daily.  loratadine (CLARITIN) 10 mg tablet Take 1 Tab by mouth daily.  polyethylene glycol (MIRALAX) 17 gram packet Take 1 Packet by mouth daily as needed.  senna (SENOKOT) 8.6 mg tablet Take 1 Tab by mouth daily.  sucralfate (CARAFATE) 100 mg/mL suspension Take 10 mL by mouth Before breakfast, lunch, and dinner.  ferrous sulfate (IRON) 325 mg (65 mg iron) EC tablet Take one twice a day    cilostazol (PLETAL) 100 mg tablet One bid    gabapentin (NEURONTIN) 100 mg capsule TAKE ONE CAPSULE BY MOUTH AT BEDTIME FOR NEUROPATHIC PAIN    gemfibrozil (LOPID) 600 mg tablet TAKE 1 TABLET BY MOUTH TWICE DAILY FOR CHOLESTROL    omeprazole (PRILOSEC) 20 mg capsule TAKE ONE CAPSULE BY MOUTH DAILY FOR STOMACH    citalopram (CELEXA) 40 mg tablet TAKE 1 TABLET BY MOUTH AT BEDTIME FOR DEPRESSION    guaiFENesin (MUCUS RELIEF) 400 mg tablet Take 600 mg by mouth two (2) times a day. No current facility-administered medications for this visit. Review of Symptoms:    CONST  No weight change. No fever, chills, sweats    ENT No visual changes, URI sx, sore throat    CV  See HPI   RESP  No cough, or sputum, wheezing. Also see HPI   GI  No abdominal pain or change in bowel habits. No heartburn or dysphagia. No melena or rectal bleeding.       No dysuria, urgency, frequency, hematuria   MSKEL  No joint pain, swelling. No muscle pain. SKIN  No rash or lesions. NEURO  No headache, syncope, or seizure. No weakness, loss of sensation, or paresthesias. PSYCH  No low mood or depression  No anxiety. HE/LYMPH  No easy bruising, abnormal bleeding, or enlarged glands.         Physical ExamPhysical Exam:    Visit Vitals    Ht 5' 2\" (1.575 m)     Gen: NAD  HEENT:  PERRL, throat clear  Neck: no adenopathy, no thyromegaly, no JVD   Heart:  Regular,Nl S1S2,  no murmur, gallop or rub.   Lungs:  clear  Abdomen:   Soft, non-tender, bowel sounds are active.   Extremities:  No edema  Pulse: symmetric  Neuro: A&O times 3, No focal neuro deficits    Cardiographics    Labs:   Lab Results   Component Value Date/Time    Sodium 138 06/07/2017 04:00 AM    Sodium 140 06/06/2017 04:00 AM    Sodium 140 06/05/2017 04:00 AM    Sodium 138 06/04/2017 03:15 PM    Sodium 141 05/11/2017 09:10 AM    Potassium 3.6 06/07/2017 04:00 AM    Potassium 3.6 06/06/2017 04:00 AM    Potassium 3.4 06/05/2017 04:00 AM    Potassium 3.7 06/04/2017 03:15 PM    Potassium 5.3 05/11/2017 09:10 AM    Chloride 101 06/07/2017 04:00 AM    Chloride 100 06/06/2017 04:00 AM    Chloride 100 06/05/2017 04:00 AM    Chloride 100 06/04/2017 03:15 PM    Chloride 113 05/11/2017 09:10 AM    CO2 33 06/07/2017 04:00 AM    CO2 35 06/06/2017 04:00 AM    CO2 34 06/05/2017 04:00 AM    CO2 28 06/04/2017 03:15 PM    CO2 20 05/11/2017 09:10 AM    Anion gap 4 06/07/2017 04:00 AM    Anion gap 5 06/06/2017 04:00 AM    Anion gap 6 06/05/2017 04:00 AM    Anion gap 10 06/04/2017 03:15 PM    Anion gap 8 05/11/2017 09:10 AM    Glucose 112 06/07/2017 04:00 AM    Glucose 87 06/06/2017 04:00 AM    Glucose 77 06/05/2017 04:00 AM    Glucose 97 06/04/2017 03:15 PM    Glucose 110 05/11/2017 09:10 AM    BUN 13 06/07/2017 04:00 AM    BUN 12 06/06/2017 04:00 AM    BUN 10 06/05/2017 04:00 AM    BUN 11 06/04/2017 03:15 PM    BUN 45 05/11/2017 09:10 AM    Creatinine 0.97 06/07/2017 04:00 AM    Creatinine 1.01 06/06/2017 04:00 AM    Creatinine 0.86 06/05/2017 04:00 AM    Creatinine 0.98 06/04/2017 03:15 PM    Creatinine 1.86 05/11/2017 09:10 AM    BUN/Creatinine ratio 13 06/07/2017 04:00 AM    BUN/Creatinine ratio 12 06/06/2017 04:00 AM    BUN/Creatinine ratio 12 06/05/2017 04:00 AM    BUN/Creatinine ratio 11 06/04/2017 03:15 PM    BUN/Creatinine ratio 24 05/11/2017 09:10 AM    GFR est AA >60 06/07/2017 04:00 AM    GFR est AA >60 06/06/2017 04:00 AM    GFR est AA >60 06/05/2017 04:00 AM    GFR est AA >60 06/04/2017 03:15 PM    GFR est AA 31 05/11/2017 09:10 AM    GFR est non-AA 54 06/07/2017 04:00 AM    GFR est non-AA 51 06/06/2017 04:00 AM    GFR est non-AA >60 06/05/2017 04:00 AM    GFR est non-AA 53 06/04/2017 03:15 PM    GFR est non-AA 25 05/11/2017 09:10 AM    Calcium 8.6 06/07/2017 04:00 AM    Calcium 8.4 06/06/2017 04:00 AM    Calcium 8.8 06/05/2017 04:00 AM    Calcium 8.9 06/04/2017 03:15 PM    Calcium 9.1 05/11/2017 09:10 AM    Bilirubin, total 0.6 06/04/2017 03:15 PM    Bilirubin, total 0.4 05/11/2017 09:10 AM    Bilirubin, total 0.6 05/10/2017 04:14 AM    Bilirubin, total 0.5 05/09/2017 03:29 PM    Bilirubin, total 0.7 04/19/2017 04:00 PM    AST (SGOT) 15 06/04/2017 03:15 PM    AST (SGOT) 12 05/11/2017 09:10 AM    AST (SGOT) 11 05/10/2017 04:14 AM    AST (SGOT) 6 05/09/2017 03:29 PM    AST (SGOT) 54 04/19/2017 04:00 PM    Alk. phosphatase 66 06/04/2017 03:15 PM    Alk. phosphatase 75 05/11/2017 09:10 AM    Alk. phosphatase 73 05/10/2017 04:14 AM    Alk. phosphatase 71 05/09/2017 03:29 PM    Alk.  phosphatase 100 04/19/2017 04:00 PM    Protein, total 6.3 06/04/2017 03:15 PM    Protein, total 5.8 05/11/2017 09:10 AM    Protein, total 6.0 05/10/2017 04:14 AM    Protein, total 5.9 05/09/2017 03:29 PM    Protein, total 6.7 04/19/2017 04:00 PM    Albumin 3.1 06/04/2017 03:15 PM    Albumin 2.6 05/11/2017 09:10 AM    Albumin 2.7 05/10/2017 04:14 AM    Albumin 2.8 05/09/2017 03:29 PM    Albumin 3.4 04/19/2017 04:00 PM    Globulin 3.2 06/04/2017 03:15 PM    Globulin 3.2 05/11/2017 09:10 AM    Globulin 3.3 05/10/2017 04:14 AM    Globulin 3.1 05/09/2017 03:29 PM    Globulin 3.3 04/19/2017 04:00 PM    A-G Ratio 1.0 06/04/2017 03:15 PM    A-G Ratio 0.8 05/11/2017 09:10 AM    A-G Ratio 0.8 05/10/2017 04:14 AM    A-G Ratio 0.9 05/09/2017 03:29 PM    A-G Ratio 1.0 04/19/2017 04:00 PM    ALT (SGPT) 14 06/04/2017 03:15 PM    ALT (SGPT) 11 05/11/2017 09:10 AM    ALT (SGPT) 9 05/10/2017 04:14 AM    ALT (SGPT) 10 05/09/2017 03:29 PM    ALT (SGPT) 67 04/19/2017 04:00 PM     Lab Results   Component Value Date/Time    CK 54 06/04/2017 03:15 PM     Lab Results   Component Value Date/Time    Cholesterol, total 153 04/14/2017 08:41 AM    Cholesterol, total 169 07/21/2016 08:54 AM    Cholesterol, total 182 02/18/2016 10:21 AM    Cholesterol, total 182 09/10/2015 09:54 AM    Cholesterol, total 165 03/12/2015 10:22 AM    HDL Cholesterol 77 04/14/2017 08:41 AM    HDL Cholesterol 74 07/21/2016 08:54 AM    HDL Cholesterol 84 02/18/2016 10:21 AM    HDL Cholesterol 81 09/10/2015 09:54 AM    HDL Cholesterol 64 03/12/2015 10:22 AM    LDL, calculated 59 04/14/2017 08:41 AM    LDL, calculated 80 07/21/2016 08:54 AM    LDL, calculated 83 02/18/2016 10:21 AM    LDL, calculated 86 09/10/2015 09:54 AM    LDL, calculated 76 03/12/2015 10:22 AM    Triglyceride 87 04/14/2017 08:41 AM    Triglyceride 75 07/21/2016 08:54 AM    Triglyceride 77 02/18/2016 10:21 AM    Triglyceride 76 09/10/2015 09:54 AM    Triglyceride 126 03/12/2015 10:22 AM     No results found for this or any previous visit.     Assessment:         Patient Active Problem List    Diagnosis Date Noted    Pleural effusion, bilateral 06/04/2017    Respiratory failure ([facility] 06/04/2017    Acute on chronic diastolic (congestive) heart failure 06/04/2017    Prediabetes 06/04/2017    Chronic diastolic CHF (congestive heart failure) (HCC)     Anemia     S/P cardiac pacemaker procedure 05/11/2017    Acute renal failure ([facility] 05/11/2017    Sick sinus syndrome ([facility] 05/09/2017    Complete heart block ([facility] 05/09/2017    Mitral stenosis 04/21/2017    Pulmonary hypertension 04/21/2017    Aortic stenosis, moderate 04/21/2017    Iron deficiency anemia 04/20/2017    Osteoporosis 88/87/8999    Diastolic CHF, acute on chronic ([facility] 04/19/2017    Bronchitis 04/19/2017    Advance care planning 01/17/2017    Neuropathy 10/11/2016    Gastroesophageal reflux disease 10/11/2016    Osteoarthritis 09/10/2015    PAD (peripheral artery disease) (HCC)     Anxiety     Hyperlipidemia     Asthma     Hypertension     Chronic pain       Stable sx, GUZMÁN on home O2 prn (rarely uses). No other CV sx or complaints. Residing at [facility]. Seen by Dr. Forest Gaspar in August for PPM f/u. Echo 4/17 with LVEF 55-60, mod MS, mod AS, pulm hypertension. Plan:     Doing well with no adverse cardiac symptoms. PPM per Dr. Forest Gaspar.   Lipids and labs followed by PCP. Continue current care and f/u in 3 months with PPM check at that time.     Toñito David MD

## 2017-12-01 NOTE — MR AVS SNAPSHOT
Visit Information Date & Time Provider Department Dept. Phone Encounter #  
 12/1/2017 10:00 AM Meliza Hall, [address] [facility]iology [facility] [phone] [id number] Follow-up Instructions Return in about 3 months (around 3/1/2018). Follow-up and Disposition History Your Appointments 2/26/2018  2:40 PM  
ESTABLISHED PATIENT with Meliza Hall MD  
[address] [facility] [place] [facility] [address] [place] [address]) Appt Note: BS pacemaker check $0cp [address] [facility] [address] [id number] [phone]  
  
   
 [address] [place] [address] [address] [address] Upcoming Health Maintenance Date Due DTaP/Tdap/Td series (1 - Tdap) 11/24/2015 COLONOSCOPY 6/7/2016 Influenza Age 5 to Adult 8/1/2017 MEDICARE YEARLY EXAM 1/18/2018 GLAUCOMA SCREENING Q2Y 1/17/2019 Allergies as of 12/1/2017  Review Complete On: 12/1/2017 By: Meliza Hall MD  
  
 Severity Noted Reaction Type Reactions Doxycycline High 06/03/2017    Unknown (comments) WEAKNESS Amoxicillin  08/20/2013    Unknown (comments) Biaxin [Clarithromycin]  01/15/2014    Unknown (comments) Celebrex [Celecoxib]  09/24/2014    Other (comments) Blood in urine Indocin [Indomethacin]  01/15/2014    Unknown (comments) Lactose  06/03/2017   Intolerance Diarrhea Zithromax [Azithromycin]  01/15/2014    Unknown (comments) Patient said she is allergic all Mycins Current Immunizations  Reviewed on 5/9/2017 Name Date Influenza High Dose Vaccine PF 10/11/2016, 10/8/2015 Influenza Vaccine 9/24/2014, 10/23/2013, 9/19/2012 Pneumococcal Conjugate (PCV-13) 10/11/2016 Pneumococcal Vaccine (Unspecified Type) 1/23/2009 Td 11/23/2015, 12/14/2008 Not reviewed this visit You Were Diagnosed With   
  
 Codes Comments Sick sinus syndrome ([facility]    -  Primary ICD-10-CM: I49.5 ICD-9-CM: 427.81   
 S/P cardiac pacemaker procedure     ICD-10-CM: Z95.0 ICD-9-CM: V45.01 Aortic stenosis, moderate     ICD-10-CM: I35.0 ICD-9-CM: 424.1 Chronic diastolic CHF (congestive heart failure) ([facility])     ICD-10-CM: I50.32 
ICD-9-CM: 428.32, 428.0 Essential hypertension     ICD-10-CM: I10 
ICD-9-CM: 401.9 Mitral valve stenosis, unspecified etiology     ICD-10-CM: I05.0 ICD-9-CM: 394.0 Localized edema     ICD-10-CM: R60.0 ICD-9-CM: 782.3 PAD (peripheral artery disease) ([facility])     ICD-10-CM: I73.9 ICD-9-CM: 443.9 Pulmonary hypertension     ICD-10-CM: I27.20 ICD-9-CM: 416.8 Simple chronic bronchitis ([facility])     ICD-10-CM: J41.0 ICD-9-CM: 491.0 Vitals BP Pulse Resp Height(growth percentile) Weight(growth percentile) SpO2  
 110/64 (BP 1 Location: Right arm, BP Patient Position: Sitting) 76 18 5' 2\" (1.575 m) 144 lb (65.3 kg) 97% BMI OB Status Smoking Status 26.34 kg/m2 Menopause Never Smoker Vitals History BMI and BSA Data Body Mass Index Body Surface Area  
 26.34 kg/m 2 1.69 m 2 Preferred Pharmacy Pharmacy Name Phone [facility] [address] [place] [address] Your Updated Medication List  
  
   
This list is accurate as of: 12/1/17 10:46 AM.  Always use your most recent med list.  
  
  
  
  
 acetaminophen 325 mg tablet Commonly known as:  TYLENOL Take 2 Tabs by mouth every four (4) hours as needed. albuterol-ipratropium 2.5 mg-0.5 mg/3 ml Nebu Commonly known as:  DUO-NEB  
3 mL by Nebulization route every four (4) hours as needed. aspirin 81 mg chewable tablet Take 1 Tab by mouth daily. cilostazol 100 mg tablet Commonly known as:  PLETAL One bid  
  
 citalopram 40 mg tablet Commonly known as:  CELEXA  
TAKE 1 TABLET BY MOUTH AT BEDTIME FOR DEPRESSION  
  
 docusate sodium 100 mg capsule Commonly known as:  Judy Jessica Take 100 mg by mouth two (2) times a day. ferrous sulfate 325 mg (65 mg iron) EC tablet Commonly known as:  IRON Take one twice a day  
  
 furosemide 20 mg tablet Commonly known as:  LASIX Take 1 Tab by mouth daily. gabapentin 100 mg capsule Commonly known as:  NEURONTIN  
TAKE ONE CAPSULE BY MOUTH AT BEDTIME FOR NEUROPATHIC PAIN  
  
 gemfibrozil 600 mg tablet Commonly known as:  LOPID TAKE 1 TABLET BY MOUTH TWICE DAILY FOR CHOLESTROL HYDROcodone-acetaminophen 5-325 mg per tablet Commonly known as:  Cecil Ill Take 1 Tab by mouth every eight (8) hours as needed. Max Daily Amount: 3 Tabs.  
  
 loratadine 10 mg tablet Commonly known as:  Jackie Lavelle Take 1 Tab by mouth daily. losartan 25 mg tablet Commonly known as:  COZAAR Take 1 Tab by mouth daily. metoprolol succinate 50 mg XL tablet Commonly known as:  TOPROL-XL Take 1 Tab by mouth daily. Indications: Chronic Heart Failure, hypertension MUCUS RELIEF 400 mg tablet Generic drug:  guaiFENesin Take 600 mg by mouth two (2) times a day. omeprazole 20 mg capsule Commonly known as:  PRILOSEC  
TAKE ONE CAPSULE BY MOUTH DAILY FOR STOMACH  
  
 polyethylene glycol 17 gram packet Commonly known as:  Rosy  Take 1 Packet by mouth daily as needed. potassium chloride SR 10 mEq tablet Commonly known as:  KLOR-CON 10 Take 1 Tab by mouth daily. QVAR 40 mcg/actuation [employer] Generic drug:  beclomethasone Take 2 Puffs by inhalation two (2) times a day. senna 8.6 mg tablet Commonly known as:  Peter Kicammiediane Bry Take 1 Tab by mouth daily. sucralfate 100 mg/mL suspension Commonly known as:  Callie Luster Take 10 mL by mouth Before breakfast, lunch, and dinner. Follow-up Instructions Return in about 3 months (around 3/1/2018). Introducing [facility]!    
 [employer] [facility] introduces [employer] patient portal. Now you can access parts of your medical record, email your doctor's office, and request medication refills online. 1. In your internet browser, go to [email or web address] 2. Click on the First Time User? Click Here link in the Sign In box. You will see the New Member Sign Up page. 3. Enter your [employer] Access Code exactly as it appears below. You will not need to use this code after youve completed the sign-up process. If you do not sign up before the expiration date, you must request a new code. · [employer] Access Code: [email or web address] Expires: 3/1/2018 10:39 AM 
 
4. Enter the last four digits of your Social Security Number (xxxx) and Date of Birth (mm/dd/yyyy) as indicated and click Submit. You will be taken to the next sign-up page. 5. Create a [employer] ID. This will be your [employer] login ID and cannot be changed, so think of one that is secure and easy to remember. 6. Create a [employer] password. You can change your password at any time. 7. Enter your Password Reset Question and Answer. This can be used at a later time if you forget your password. 8. Enter your e-mail address. You will receive e-mail notification when new information is available in [address] 9. Click Sign Up. You can now view and download portions of your medical record. 10. Click the Download Summary menu link to download a portable copy of your medical information. If you have questions, please visit the Frequently Asked Questions section of the [employer] website. Remember, [employer] is NOT to be used for urgent needs. For medical emergencies, dial 911. Now available from your iPhone and Android! Please provide this summary of care documentation to your next provider. Your primary care clinician is listed as June B Killian. If you have any questions after today's visit, please call [phone].

## 2018-01-18 PROBLEM — I50.31 ACUTE DIASTOLIC CHF (CONGESTIVE HEART FAILURE) (HCC): Status: ACTIVE | Noted: 2017-06-04

## 2018-01-18 PROBLEM — J20.9 ACUTE BRONCHITIS: Status: ACTIVE | Noted: 2017-04-19

## 2018-02-12 ENCOUNTER — TELEPHONE (OUTPATIENT)
Dept: CARDIOLOGY CLINIC | Age: 83
End: 2018-02-12

## 2018-02-12 NOTE — TELEPHONE ENCOUNTER
Spoke with Mar Freeman.  Verified patient with two patient identifiers. Mar Freeman states that the pt has been coughing. Asked if the nursing home is aware of the congestion, coughing. Per the son, \"recently diagnosed with some pneumonia and MRSA. \"  Son was unable to answer most of my questions. Informed the son that I will touch base with [facility]. Called [facility] and spoke with Maurisio Walsh (nurse.) The nursing staff and Dr. Lee Daly are fulling aware of the pts sx's. Pt is being treated for MRSA with IV antibiotics. O2 sats are running 95-98%.      Pts upcoming appt with Dr. Sonia Hdz:    2/26/2018 2:40 PM Carson Amador MD

## 2018-02-12 NOTE — TELEPHONE ENCOUNTER
Pt in nursing home. Pt scheduled with Dr Marshall Sandoval 2/26/18. Son states for past 3 weeks, pt having cough and trouble breathing - same sx's she had prior to receiving pacemaker. Pt currently being treated for MRSA as well. Son inquiring if the cough and breathing problems need to be addressed prior to arleth't on 2/26/18.     Requesting c/b

## 2018-02-26 ENCOUNTER — CLINICAL SUPPORT (OUTPATIENT)
Dept: CARDIOLOGY CLINIC | Age: 83
End: 2018-02-26

## 2018-02-26 ENCOUNTER — OFFICE VISIT (OUTPATIENT)
Dept: CARDIOLOGY CLINIC | Age: 83
End: 2018-02-26

## 2018-02-26 VITALS
HEART RATE: 60 BPM | SYSTOLIC BLOOD PRESSURE: 96 MMHG | OXYGEN SATURATION: 97 % | DIASTOLIC BLOOD PRESSURE: 60 MMHG | HEIGHT: 62 IN | RESPIRATION RATE: 16 BRPM

## 2018-02-26 DIAGNOSIS — I44.2 COMPLETE HEART BLOCK (HCC): ICD-10-CM

## 2018-02-26 DIAGNOSIS — I35.0 AORTIC STENOSIS, MODERATE: ICD-10-CM

## 2018-02-26 DIAGNOSIS — I27.20 PULMONARY HYPERTENSION (HCC): ICD-10-CM

## 2018-02-26 DIAGNOSIS — I50.32 CHRONIC DIASTOLIC CHF (CONGESTIVE HEART FAILURE) (HCC): Primary | Chronic | ICD-10-CM

## 2018-02-26 DIAGNOSIS — I49.5 SICK SINUS SYNDROME (HCC): ICD-10-CM

## 2018-02-26 DIAGNOSIS — I10 ESSENTIAL HYPERTENSION: ICD-10-CM

## 2018-02-26 DIAGNOSIS — Z95.0 S/P CARDIAC PACEMAKER PROCEDURE: ICD-10-CM

## 2018-02-26 DIAGNOSIS — Z95.0 S/P CARDIAC PACEMAKER PROCEDURE: Primary | Chronic | ICD-10-CM

## 2018-02-26 DIAGNOSIS — J96.10 CHRONIC RESPIRATORY FAILURE, UNSPECIFIED WHETHER WITH HYPOXIA OR HYPERCAPNIA (HCC): ICD-10-CM

## 2018-02-26 DIAGNOSIS — I05.0 MITRAL VALVE STENOSIS, UNSPECIFIED ETIOLOGY: Chronic | ICD-10-CM

## 2018-02-26 NOTE — PROGRESS NOTES
Taurus Zuluaga is a 80 y.o. female is here for f/u and PPM check. S/p recent hospitalization at [facility] with bronchopneumonia, UTI. Hx sick sinus, diastolic CHF, s/p PPM, mod AS, pleural effusions. Some GUZMÁN, elevated BNP 1410on 2/22. Given extra lasix 40mg po. Continues to see Dr. Karli Vargas. The patient denies chest pain, orthopnea, PND, LE edema, palpitations, syncope, presyncope or fatigue.        Patient Active Problem List    Diagnosis Date Noted    Pleural effusion, bilateral 06/04/2017    Respiratory failure ([address] 06/04/2017    Acute diastolic CHF (congestive heart failure) ([address] 06/04/2017    Prediabetes 06/04/2017    Chronic diastolic CHF (congestive heart failure) (HCC)     Anemia     S/P cardiac pacemaker procedure 05/11/2017    Acute renal failure ([address] 05/11/2017    Sick sinus syndrome ([address] 05/09/2017    Complete heart block ([address] 05/09/2017    Mitral stenosis 04/21/2017    Pulmonary hypertension 04/21/2017    Aortic stenosis, moderate 04/21/2017    Iron deficiency anemia 04/20/2017    Osteoporosis 03/84/3642    Diastolic CHF, acute on chronic ([address] 04/19/2017    Acute bronchitis 04/19/2017    Advance care planning 01/17/2017    Neuropathy 10/11/2016    Gastroesophageal reflux disease 10/11/2016    Osteoarthritis 09/10/2015    PAD (peripheral artery disease) (HCC)     Anxiety     Hyperlipidemia     Asthma     Hypertension     Chronic pain       Nancy Cora Primrose, MD  Past Medical History:   Diagnosis Date    Acute renal failure ([address] 5/11/2017    Anemia     Anxiety     Asthma     Breast cancer (HCC)     breast    Chronic diastolic CHF (congestive heart failure) (HCC)     Chronic pain     Elevated lipids     GERD (gastroesophageal reflux disease)     Heart murmur     Hypercholesterolemia     Hyperlipidemia     Hypertension     Leg cramps     Mitral stenosis 4/21/2017    Osteoporosis     [facility]per[facility] disease) [facility]     Pleural effusion, bilateral 6/4/2017    Prediabetes 6/4/2017    a1c 6.3 1 month ago    Respiratory failure ([facility] 6/4/2017    Based on ER sats 88% on RA    S/P cardiac pacemaker procedure 5/11/2017 5/9/17 [employer] dual chamber pacemaker    Sick sinus syndrome [facility])       Past Surgical History:   Procedure Laterality Date    BREAST SURGERY PROCEDURE UNLISTED      HX CATARACT REMOVAL      HX COLONOSCOPY  06/07/2006    HX PACEMAKER PLACEMENT  2017     Allergies   Allergen Reactions    Latex Other (comments)    Doxycycline Unknown (comments)     WEAKNESS    Amoxicillin Unknown (comments)    Biaxin [Clarithromycin] Unknown (comments)    Celebrex [Celecoxib] Other (comments)     Blood in urine    Indocin [Indomethacin] Unknown (comments)    Lactose Diarrhea    Zithromax [Azithromycin] Unknown (comments)     Patient said she is allergic all Mycins      Family History   Problem Relation Age of Onset    Cancer Brother      kidney CA      Social History     Social History    Marital status:      Spouse name: N/A    Number of children: 1    Years of education: N/A     Occupational History    Not on file. Social History Main Topics    Smoking status: Never Smoker    Smokeless tobacco: Never Used    Alcohol use No    Drug use: No    Sexual activity: Not Currently     Partners: Male     Other Topics Concern    Not on file     Social History Narrative    Lives alone, her son lives at [place] and visit/help her once in a week,      Current Outpatient Prescriptions   Medication Sig    Oxygen 2 L    docusate sodium (COLACE) 100 mg capsule Take 100 mg by mouth two (2) times a day.  albuterol-ipratropium (DUO-NEB) 2.5 mg-0.5 mg/3 ml nebu 3 mL by Nebulization route every four (4) hours as needed.  losartan (COZAAR) 25 mg tablet Take 1 Tab by mouth daily.  metoprolol succinate (TOPROL-XL) 50 mg XL tablet Take 1 Tab by mouth daily.  Indications: Chronic Heart Failure, hypertension    potassium chloride SR (KLOR-CON 10) 10 mEq tablet Take 1 Tab by mouth daily.  HYDROcodone-acetaminophen (NORCO) 5-325 mg per tablet Take 1 Tab by mouth every eight (8) hours as needed. Max Daily Amount: 3 Tabs.  furosemide (LASIX) 20 mg tablet Take 1 Tab by mouth daily.  beclomethasone (QVAR) 40 mcg/actuation aero Take 2 Puffs by inhalation two (2) times a day.  acetaminophen (TYLENOL) 325 mg tablet Take 2 Tabs by mouth every four (4) hours as needed.  aspirin 81 mg chewable tablet Take 1 Tab by mouth daily.  loratadine (CLARITIN) 10 mg tablet Take 1 Tab by mouth daily.  polyethylene glycol (MIRALAX) 17 gram packet Take 1 Packet by mouth daily as needed.  senna (SENOKOT) 8.6 mg tablet Take 1 Tab by mouth daily.  sucralfate (CARAFATE) 100 mg/mL suspension Take 10 mL by mouth Before breakfast, lunch, and dinner.  ferrous sulfate (IRON) 325 mg (65 mg iron) EC tablet Take one twice a day    cilostazol (PLETAL) 100 mg tablet One bid    gabapentin (NEURONTIN) 100 mg capsule TAKE ONE CAPSULE BY MOUTH AT BEDTIME FOR NEUROPATHIC PAIN    gemfibrozil (LOPID) 600 mg tablet TAKE 1 TABLET BY MOUTH TWICE DAILY FOR CHOLESTROL    omeprazole (PRILOSEC) 20 mg capsule TAKE ONE CAPSULE BY MOUTH DAILY FOR STOMACH    citalopram (CELEXA) 40 mg tablet TAKE 1 TABLET BY MOUTH AT BEDTIME FOR DEPRESSION    guaiFENesin (MUCUS RELIEF) 400 mg tablet Take 600 mg by mouth two (2) times a day. No current facility-administered medications for this visit. Review of Symptoms:    CONST  No weight change. No fever, chills, sweats    ENT No visual changes, URI sx, sore throat    CV  See HPI   RESP  No cough, or sputum, wheezing. Also see HPI   GI  No abdominal pain or change in bowel habits. No heartburn or dysphagia. No melena or rectal bleeding.   No dysuria, urgency, frequency, hematuria   MSKEL  No joint pain, swelling. No muscle pain. SKIN  No rash or lesions. NEURO  No headache, syncope, or seizure.    No weakness, loss of sensation, or paresthesias. PSYCH  No low mood or depression  No anxiety. HE/LYMPH  No easy bruising, abnormal bleeding, or enlarged glands.         Physical ExamPhysical Exam:    Visit Vitals    BP 96/60 (BP 1 Location: Left arm, BP Patient Position: Sitting)    Pulse 60    Resp 16    Ht 5' 2\" (1.575 m)    SpO2 97%     Gen: NAD  HEENT:  PERRL, throat clear  Neck: no adenopathy, no thyromegaly, no JVD   Heart:  Regular,Nl S1S2,  III/VI  murmur, no gallop or rub.   Lungs:  clear  Abdomen:   Soft, non-tender, bowel sounds are active.   Extremities:  Trace edema  Pulse: symmetric  Neuro: A&O times 3, No focal neuro deficits    Cardiographics    ECG: V paced    PPM check today in office--see scanned    Labs:   Lab Results   Component Value Date/Time    Sodium 137 01/22/2018 10:00 AM    Sodium 141 01/20/2018 06:06 AM    Sodium 139 01/19/2018 05:30 AM    Sodium 143 01/18/2018 08:15 PM    Sodium 138 06/07/2017 04:00 AM    Potassium 4.0 01/22/2018 10:00 AM    Potassium 4.8 01/20/2018 06:06 AM    Potassium 3.9 01/19/2018 05:30 AM    Potassium 5.1 01/18/2018 08:15 PM    Potassium 3.6 06/07/2017 04:00 AM    Chloride 102 01/22/2018 10:00 AM    Chloride 103 01/20/2018 06:06 AM    Chloride 101 01/19/2018 05:30 AM    Chloride 106 01/18/2018 08:15 PM    Chloride 101 06/07/2017 04:00 AM    CO2 24 01/22/2018 10:00 AM    CO2 23 01/20/2018 06:06 AM    CO2 25 01/19/2018 05:30 AM    CO2 25 01/18/2018 08:15 PM    CO2 33 (H) 06/07/2017 04:00 AM    Anion gap 11 01/22/2018 10:00 AM    Anion gap 15 01/20/2018 06:06 AM    Anion gap 13 01/19/2018 05:30 AM    Anion gap 12 01/18/2018 08:15 PM    Anion gap 4 (L) 06/07/2017 04:00 AM    Glucose 146 (H) 01/22/2018 10:00 AM    Glucose 167 (H) 01/20/2018 06:06 AM    Glucose 199 (H) 01/19/2018 05:30 AM    Glucose 156 (H) 01/18/2018 08:15 PM    Glucose 112 (H) 06/07/2017 04:00 AM    BUN 43 (H) 01/22/2018 10:00 AM    BUN 30 (H) 01/20/2018 06:06 AM    BUN 23 (H) 01/19/2018 05:30 AM    BUN 28 (H) 01/18/2018 08:15 PM    BUN 13 06/07/2017 04:00 AM    Creatinine 1.58 (H) 01/22/2018 10:00 AM    Creatinine 1.42 (H) 01/20/2018 06:06 AM    Creatinine 1.38 (H) 01/19/2018 05:30 AM    Creatinine 1.62 (H) 01/18/2018 08:15 PM    Creatinine 0.97 06/07/2017 04:00 AM    BUN/Creatinine ratio 27 (H) 01/22/2018 10:00 AM    BUN/Creatinine ratio 21 (H) 01/20/2018 06:06 AM    BUN/Creatinine ratio 17 01/19/2018 05:30 AM    BUN/Creatinine ratio 17 01/18/2018 08:15 PM    BUN/Creatinine ratio 13 06/07/2017 04:00 AM    GFR est AA 37 (L) 01/22/2018 10:00 AM    GFR est AA 42 (L) 01/20/2018 06:06 AM    GFR est AA 43 (L) 01/19/2018 05:30 AM    GFR est AA 36 (L) 01/18/2018 08:15 PM    GFR est AA >60 06/07/2017 04:00 AM    GFR est non-AA 31 (L) 01/22/2018 10:00 AM    GFR est non-AA 35 (L) 01/20/2018 06:06 AM    GFR est non-AA 36 (L) 01/19/2018 05:30 AM    GFR est non-AA 30 (L) 01/18/2018 08:15 PM    GFR est non-AA 54 (L) 06/07/2017 04:00 AM    Calcium 9.0 01/22/2018 10:00 AM    Calcium 9.3 01/20/2018 06:06 AM    Calcium 9.3 01/19/2018 05:30 AM    Calcium 9.8 01/18/2018 08:15 PM    Calcium 8.6 06/07/2017 04:00 AM    Bilirubin, total 0.3 01/18/2018 08:15 PM    Bilirubin, total 0.6 06/04/2017 03:15 PM    Bilirubin, total 0.4 05/11/2017 09:10 AM    Bilirubin, total 0.6 05/10/2017 04:14 AM    Bilirubin, total 0.5 05/09/2017 03:29 PM    AST (SGOT) 10 (L) 01/18/2018 08:15 PM    AST (SGOT) 15 06/04/2017 03:15 PM    AST (SGOT) 12 (L) 05/11/2017 09:10 AM    AST (SGOT) 11 (L) 05/10/2017 04:14 AM    AST (SGOT) 6 (L) 05/09/2017 03:29 PM    Alk. phosphatase 84 01/18/2018 08:15 PM    Alk. phosphatase 66 06/04/2017 03:15 PM    Alk. phosphatase 75 05/11/2017 09:10 AM    Alk. phosphatase 73 05/10/2017 04:14 AM    Alk.  phosphatase 71 05/09/2017 03:29 PM    Protein, total 6.7 01/18/2018 08:15 PM    Protein, total 6.3 (L) 06/04/2017 03:15 PM    Protein, total 5.8 (L) 05/11/2017 09:10 AM    Protein, total 6.0 (L) 05/10/2017 04:14 AM    Protein, total 5.9 (L) 05/09/2017 03:29 PM    Albumin 3.2 (L) 01/18/2018 08:15 PM    Albumin 3.1 (L) 06/04/2017 03:15 PM    Albumin 2.6 (L) 05/11/2017 09:10 AM    Albumin 2.7 (L) 05/10/2017 04:14 AM    Albumin 2.8 (L) 05/09/2017 03:29 PM    Globulin 3.5 01/18/2018 08:15 PM    Globulin 3.2 06/04/2017 03:15 PM    Globulin 3.2 05/11/2017 09:10 AM    Globulin 3.3 05/10/2017 04:14 AM    Globulin 3.1 05/09/2017 03:29 PM    A-G Ratio 0.9 (L) 01/18/2018 08:15 PM    A-G Ratio 1.0 (L) 06/04/2017 03:15 PM    A-G Ratio 0.8 (L) 05/11/2017 09:10 AM    A-G Ratio 0.8 (L) 05/10/2017 04:14 AM    A-G Ratio 0.9 (L) 05/09/2017 03:29 PM    ALT (SGPT) 8 (L) 01/18/2018 08:15 PM    ALT (SGPT) 14 06/04/2017 03:15 PM    ALT (SGPT) 11 (L) 05/11/2017 09:10 AM    ALT (SGPT) 9 (L) 05/10/2017 04:14 AM    ALT (SGPT) 10 (L) 05/09/2017 03:29 PM     Lab Results   Component Value Date/Time    CK 54 06/04/2017 03:15 PM     Lab Results   Component Value Date/Time    Cholesterol, total 153 04/14/2017 08:41 AM    Cholesterol, total 169 07/21/2016 08:54 AM    Cholesterol, total 182 02/18/2016 10:21 AM    Cholesterol, total 182 09/10/2015 09:54 AM    Cholesterol, total 165 03/12/2015 10:22 AM    HDL Cholesterol 77 04/14/2017 08:41 AM    HDL Cholesterol 74 07/21/2016 08:54 AM    HDL Cholesterol 84 02/18/2016 10:21 AM    HDL Cholesterol 81 09/10/2015 09:54 AM    HDL Cholesterol 64 03/12/2015 10:22 AM    LDL, calculated 59 04/14/2017 08:41 AM    LDL, calculated 80 07/21/2016 08:54 AM    LDL, calculated 83 02/18/2016 10:21 AM    LDL, calculated 86 09/10/2015 09:54 AM    LDL, calculated 76 03/12/2015 10:22 AM    Triglyceride 87 04/14/2017 08:41 AM    Triglyceride 75 07/21/2016 08:54 AM    Triglyceride 77 02/18/2016 10:21 AM    Triglyceride 76 09/10/2015 09:54 AM    Triglyceride 126 03/12/2015 10:22 AM     No results found for this or any previous visit.     Assessment:         Patient Active Problem List    Diagnosis Date Noted    Pleural effusion, bilateral 06/04/2017  Respiratory failure ([facility] 06/04/2017    Acute diastolic CHF (congestive heart failure) ([facility] 06/04/2017    Prediabetes 06/04/2017    Chronic diastolic [facility]congest[facility] failure) ([facility]     Anemia     S/P cardiac pacemaker procedure 05/11/2017    Acute renal failure ([facility]) 05/11/2017    Sick sinus syndrome ([facility] 05/09/2017    Complete [facility] block ([facility] 05/09/2017    Mitral stenosis 04/21/2017    Pulmonary hypertension 04/21/2017    Aortic stenosis, moderate 04/21/2017    Iron deficiency anemia 04/20/2017    Osteoporosis 31/37/2603    Diastolic CHF, acute on chronic ([facility] 04/19/2017    Acute bronchitis 04/19/2017    Advance care planning 01/17/2017    Neuropathy 10/11/2016    Gastroesophageal reflux disease 10/11/2016    Osteoarthritis 09/10/2015    PAD (peripheral artery disease) ([facility])     Anxiety     Hyperlipidemia     Asthma     Hypertension     Chronic pain      Here for f/u and PPM check. S/p recent hospitalization at [facility] with bronchopneumonia, UTI. Hx sick sinus, diastolic CHF, s/p PPM, mod AS, pleural effusions. Some GUZMÁN, elevated BNP 1410on 2/22. Given extra lasix 40mg po. Continues to see Dr. Gina Merida. Plan:      Will continue the higher dose lasix 40mg every day  Continue other meds  F/u with PCP as planned  RTC 6 mos and repeat PPM check at that time    Nishant Collins MD

## 2018-02-26 NOTE — PROGRESS NOTES
PATIENT ID VERIFIED WITH TWO PATIENT IDENTIFIERS. PATIENT MEDICATIONS REVIEWED AND APPROVED BY DR. Nely Rogel. MEDICATIONS THAT WERE REMOVED FROM THIS VISIT HAVE BEEN APPROVED BY DR. Nely Rogel. Chief Complaint   Patient presents with    AICD problem     BS PACEMAKER CHECK AND OFFICE FOLLOW UP--FLUID RETENTION AND ELEVATED BNP    CHF    Valvular Heart Disease       1. Have you been to the ER, urgent care clinic since your last visit? Hospitalized since your last visit? YES --[facility] inpatient    2. Have you seen or consulted any other health care providers outside of the [address] [place] [address] since your last visit?  Yes PCP Dr. Ronda Friedman

## 2018-09-24 ENCOUNTER — TELEPHONE (OUTPATIENT)
Dept: CARDIOLOGY CLINIC | Age: 83
End: 2018-09-24

## 2018-09-24 ENCOUNTER — CLINICAL SUPPORT (OUTPATIENT)
Dept: CARDIOLOGY CLINIC | Age: 83
End: 2018-09-24

## 2018-09-24 ENCOUNTER — OFFICE VISIT (OUTPATIENT)
Dept: CARDIOLOGY CLINIC | Age: 83
End: 2018-09-24

## 2018-09-24 VITALS
OXYGEN SATURATION: 97 % | BODY MASS INDEX: 28.52 KG/M2 | HEIGHT: 62 IN | SYSTOLIC BLOOD PRESSURE: 122 MMHG | RESPIRATION RATE: 14 BRPM | WEIGHT: 155 LBS | DIASTOLIC BLOOD PRESSURE: 82 MMHG | HEART RATE: 73 BPM

## 2018-09-24 DIAGNOSIS — I49.5 SICK SINUS SYNDROME (HCC): ICD-10-CM

## 2018-09-24 DIAGNOSIS — I05.0 MITRAL VALVE STENOSIS, UNSPECIFIED ETIOLOGY: ICD-10-CM

## 2018-09-24 DIAGNOSIS — I10 ESSENTIAL HYPERTENSION: ICD-10-CM

## 2018-09-24 DIAGNOSIS — E78.2 MIXED HYPERLIPIDEMIA: ICD-10-CM

## 2018-09-24 DIAGNOSIS — I50.32 CHRONIC DIASTOLIC CHF (CONGESTIVE HEART FAILURE) (HCC): ICD-10-CM

## 2018-09-24 DIAGNOSIS — I27.20 PULMONARY HYPERTENSION (HCC): ICD-10-CM

## 2018-09-24 DIAGNOSIS — Z95.0 CARDIAC PACEMAKER IN SITU: Primary | ICD-10-CM

## 2018-09-24 DIAGNOSIS — R60.0 LOCALIZED EDEMA: ICD-10-CM

## 2018-09-24 DIAGNOSIS — I44.2 COMPLETE HEART BLOCK (HCC): ICD-10-CM

## 2018-09-24 DIAGNOSIS — I49.5 SICK SINUS SYNDROME (HCC): Primary | ICD-10-CM

## 2018-09-24 DIAGNOSIS — J41.0 SIMPLE CHRONIC BRONCHITIS (HCC): ICD-10-CM

## 2018-09-24 DIAGNOSIS — I73.9 PAD (PERIPHERAL ARTERY DISEASE) (HCC): ICD-10-CM

## 2018-09-24 DIAGNOSIS — Z95.0 S/P CARDIAC PACEMAKER PROCEDURE: ICD-10-CM

## 2018-09-24 DIAGNOSIS — I35.0 AORTIC STENOSIS, MODERATE: ICD-10-CM

## 2018-09-24 RX ORDER — ONDANSETRON 4 MG/1
4 TABLET, ORALLY DISINTEGRATING ORAL
COMMUNITY
End: 2019-01-01 | Stop reason: ALTCHOICE

## 2018-09-24 RX ORDER — CITALOPRAM 20 MG/1
TABLET, FILM COATED ORAL EVERY EVENING
COMMUNITY
End: 2019-01-01 | Stop reason: ALTCHOICE

## 2018-09-24 RX ORDER — UREA 20 %
CREAM (GRAM) TOPICAL
COMMUNITY
End: 2019-01-01 | Stop reason: ALTCHOICE

## 2018-09-24 RX ORDER — CEFTRIAXONE 1 G/1
1 INJECTION, POWDER, FOR SOLUTION INTRAMUSCULAR; INTRAVENOUS
COMMUNITY
End: 2019-01-01 | Stop reason: ALTCHOICE

## 2018-09-24 RX ORDER — LOSARTAN POTASSIUM 25 MG/1
TABLET ORAL DAILY
COMMUNITY

## 2018-09-24 RX ORDER — SUCRALFATE 1 G/1
1 TABLET ORAL 4 TIMES DAILY
COMMUNITY
End: 2019-01-01 | Stop reason: ALTCHOICE

## 2018-09-24 NOTE — MR AVS SNAPSHOT
[address] 
 
 
 [address] [facility] [address] [id number] [phone] Patient: Concepción Espinoza 
MRN: [id number] Visit Information Date & Time Provider Department Dept. Phone Encounter #  
 2018  2:00 PM Ying Menjivar, [address] [facility] [phone] Follow-up Instructions Return in about 6 months (around 3/24/2019). Follow-up and Disposition History Your Appointments 2019 11:00 AM  
ESTABLISHED PATIENT with Ying Menjivar MD  
[address] [place] [facility] [facility]) Appt Note: 6 mo fu $0cp [address] [facility] [address] [id number] [phone]  
  
   
 [address] [place] [address] Upcoming Health Maintenance Date Due Shingrix Vaccine Age 50> (1 of 2) 1975 DTaP/Tdap/Td series (1 - Tdap) 2015 COLONOSCOPY 2016 MEDICARE YEARLY EXAM 3/14/2018 Influenza Age 5 to Adult 2018 GLAUCOMA SCREENING Q2Y 2019 Allergies as of 2018  Review Complete On: 2018 By: Ying Menjivar MD  
  
 Severity Noted Reaction Type Reactions Latex  2018    Other (comments) Doxycycline High 2017    Unknown (comments) WEAKNESS Amoxicillin  2013    Unknown (comments) Biaxin [Clarithromycin]  01/15/2014    Unknown (comments) Celebrex [Celecoxib]  2014    Other (comments) Blood in urine Indocin [Indomethacin]  01/15/2014    Unknown (comments) Lactose  2017   Intolerance Diarrhea Zithromax [Azithromycin]  01/15/2014    Unknown (comments) Patient said she is allergic all Mycins Current Immunizations  Reviewed on 2017 Name Date Influenza High Dose Vaccine PF 10/11/2016, 10/8/2015 Influenza Vaccine 2014, 10/23/2013, 2012 Pneumococcal Conjugate (PCV-13) 10/11/2016 Pneumococcal Vaccine (Unspecified Type) 1/23/2009 Td 11/23/2015, 12/14/2008 Not reviewed this visit You Were Diagnosed With   
  
 Codes Comments Sick sinus syndrome ([facility]    -  Primary ICD-10-CM: I49.5 ICD-9-CM: 427.81 S/P cardiac pacemaker procedure     ICD-10-CM: Z95.0 ICD-9-CM: V45.01 Chronic diastolic CHF (congestive heart failure) ([facility])     ICD-10-CM: I50.32 
ICD-9-CM: 428.32, 428.0 Mitral valve stenosis, unspecified etiology     ICD-10-CM: I05.0 ICD-9-CM: 394.0 Aortic stenosis, moderate     ICD-10-CM: I35.0 ICD-9-CM: 424.1 Essential hypertension     ICD-10-CM: I10 
ICD-9-CM: 401.9 Pulmonary hypertension (HCC)     ICD-10-CM: I27.20 ICD-9-CM: 416.8 Localized edema     ICD-10-CM: R60.0 ICD-9-CM: 782.3 PAD (peripheral artery disease) (HCC)     ICD-10-CM: I73.9 ICD-9-CM: 443.9 Simple chronic bronchitis (HCC)     ICD-10-CM: J41.0 ICD-9-CM: 491.0 Mixed hyperlipidemia     ICD-10-CM: E78.2 ICD-9-CM: 272.2 Vitals BP Pulse Resp Height(growth percentile) Weight(growth percentile) SpO2  
 122/82 (BP 1 Location: Right arm, BP Patient Position: Sitting) 73 14 5' 2\" (1.575 m) 155 lb (70.3 kg) 97% BMI OB Status Smoking Status 28.35 kg/m2 Menopause Never Smoker BMI and BSA Data Body Mass Index Body Surface Area  
 28.35 kg/m 2 1.75 m 2 Preferred Pharmacy Pharmacy Name Phone [facility] [address] [facility], [address] [place] [address] Your Updated Medication List  
  
   
This list is accurate as of 9/24/18  2:36 PM.  Always use your most recent med list.  
  
  
  
  
 acetaminophen 325 mg tablet Commonly known as:  TYLENOL Take 2 Tabs by mouth every four (4) hours as needed. albuterol-ipratropium 2.5 mg-0.5 mg/3 ml Nebu Commonly known as:  DUO-NEB  
3 mL by Nebulization route every four (4) hours as needed. cefTRIAXone 1 gram injection Commonly known as:  ROCEPHIN  
 1 g by IntraMUSCular route. Daily for 7 days CeleXA 20 mg tablet Generic drug:  citalopram  
Take  by mouth every evening. cilostazol 100 mg tablet Commonly known as:  PLETAL One bid  
  
 docusate sodium 100 mg capsule Commonly known as:  Nik Pata Take 100 mg by mouth two (2) times a day. ferrous sulfate 325 mg (65 mg iron) EC tablet Commonly known as:  IRON Take one twice a day  
  
 furosemide 20 mg tablet Commonly known as:  LASIX Take 1 Tab by mouth daily. gabapentin 100 mg capsule Commonly known as:  NEURONTIN  
TAKE ONE CAPSULE BY MOUTH AT BEDTIME FOR NEUROPATHIC PAIN  
  
 gemfibrozil 600 mg tablet Commonly known as:  LOPID TAKE 1 TABLET BY MOUTH TWICE DAILY FOR CHOLESTROL HYDROcodone-acetaminophen 5-325 mg per tablet Commonly known as:  Lynnetta Wahl Take 1 Tab by mouth every eight (8) hours as needed. Max Daily Amount: 3 Tabs.  
  
 loratadine 10 mg tablet Commonly known as:  [place] Melody Take 1 Tab by mouth daily. metoprolol succinate 50 mg XL tablet Commonly known as:  TOPROL-XL Take 1 Tab by mouth daily. Indications: Chronic Heart Failure, hypertension MUCUS RELIEF 400 mg tablet Generic drug:  guaiFENesin Take 600 mg by mouth two (2) times a day. omeprazole 20 mg capsule Commonly known as:  PRILOSEC  
TAKE ONE CAPSULE BY MOUTH DAILY FOR STOMACH  
  
 ondansetron 4 mg disintegrating tablet Commonly known as:  ZOFRAN ODT Take 4 mg by mouth every eight (8) hours as needed for Nausea. polyethylene glycol 17 gram packet Commonly known as:  Ainsley Clos Take 1 Packet by mouth daily as needed. potassium chloride SR 10 mEq tablet Commonly known as:  KLOR-CON 10 Take 1 Tab by mouth daily. senna 8.6 mg tablet Commonly known as:  [employer] Take 1 Tab by mouth daily. sucralfate 1 gram tablet Commonly known as:  Merlin Boy Take 1 g by mouth four (4) times daily.   
  
 urea 20 % topical cream  
 Commonly known as:  CARMOL Apply  to affected area. apply to affected area as directed--Apply to feet at 8am and 2pm every week on Wednesdays Follow-up Instructions Return in about 6 months (around 3/24/2019). Introducing hospitals & HEALTH SERVICES! Ashley Thurston introduces [employer] patient portal. Now you can access parts of your medical record, email your doctor's office, and request medication refills online. 1. In your internet browser, go to [email or web address] 2. Click on the First Time User? Click Here link in the Sign In box. You will see the New Member Sign Up page. 3. Enter your [employer] Access Code exactly as it appears below. You will not need to use this code after youve completed the sign-up process. If you do not sign up before the expiration date, you must request a new code. · [employer] Access Code: [email or web address] Expires: 12/23/2018  1:07 PM 
 
4. Enter the last four digits of your Social Security Number (xxxx) and Date of Birth (mm/dd/yyyy) as indicated and click Submit. You will be taken to the next sign-up page. 5. Create a [employer] ID. This will be your [employer] login ID and cannot be changed, so think of one that is secure and easy to remember. 6. Create a [employer] password. You can change your password at any time. 7. Enter your Password Reset Question and Answer. This can be used at a later time if you forget your password. 8. Enter your e-mail address. You will receive e-mail notification when new information is available in [address] 9. Click Sign Up. You can now view and download portions of your medical record. 10. Click the Download Summary menu link to download a portable copy of your medical information. If you have questions, please visit the Frequently Asked Questions section of the [employer] website. Remember, [employer] is NOT to be used for urgent needs. For medical emergencies, dial 911. Now available from your iPhone and Android! Please provide this summary of care documentation to your next provider. Your primary care clinician is listed as [facility]. If you have any questions after today's visit, please call [phone].

## 2018-09-24 NOTE — PROGRESS NOTES
PATIENT ID VERIFIED WITH TWO PATIENT IDENTIFIERS. PATIENT MEDICATIONS REVIEWED AND APPROVED BY DR. Rajan Ruiz. MEDICATIONS THAT WERE REMOVED FROM THIS VISIT HAVE BEEN APPROVED BY DR. Rajan Ruiz. Chief Complaint Patient presents with  CHF  
  BS pacemaker check and office follow up  Valvular Heart Disease  Hypertension  Pacemaker Check 1. Have you been to the ER, urgent care clinic since your last visit? Hospitalized since your last visit? no 
 
2. Have you seen or consulted any other health care providers outside of the [address] [place] [address] since your last visit? Include any pap smears or colon screening. yes PCP-Dr. Enedina Banks 2 weeks ago for routine follow up

## 2019-01-01 ENCOUNTER — OFFICE VISIT (OUTPATIENT)
Dept: CARDIOLOGY CLINIC | Age: 84
End: 2019-01-01

## 2019-01-01 ENCOUNTER — CLINICAL SUPPORT (OUTPATIENT)
Dept: CARDIOLOGY CLINIC | Age: 84
End: 2019-01-01

## 2019-01-01 VITALS
BODY MASS INDEX: 29.63 KG/M2 | SYSTOLIC BLOOD PRESSURE: 114 MMHG | HEART RATE: 66 BPM | HEIGHT: 62 IN | DIASTOLIC BLOOD PRESSURE: 80 MMHG | OXYGEN SATURATION: 92 % | RESPIRATION RATE: 18 BRPM

## 2019-01-01 VITALS
HEART RATE: 92 BPM | WEIGHT: 162 LBS | DIASTOLIC BLOOD PRESSURE: 60 MMHG | SYSTOLIC BLOOD PRESSURE: 84 MMHG | HEIGHT: 62 IN | RESPIRATION RATE: 20 BRPM | BODY MASS INDEX: 29.81 KG/M2 | OXYGEN SATURATION: 95 %

## 2019-01-01 DIAGNOSIS — I10 ESSENTIAL HYPERTENSION: Chronic | ICD-10-CM

## 2019-01-01 DIAGNOSIS — Z95.0 S/P CARDIAC PACEMAKER PROCEDURE: Chronic | ICD-10-CM

## 2019-01-01 DIAGNOSIS — R06.02 SOB (SHORTNESS OF BREATH): ICD-10-CM

## 2019-01-01 DIAGNOSIS — I35.0 AORTIC STENOSIS, MODERATE: ICD-10-CM

## 2019-01-01 DIAGNOSIS — I27.20 PULMONARY HYPERTENSION (HCC): ICD-10-CM

## 2019-01-01 DIAGNOSIS — I50.32 CHRONIC DIASTOLIC CHF (CONGESTIVE HEART FAILURE) (HCC): Chronic | ICD-10-CM

## 2019-01-01 DIAGNOSIS — I50.33 ACUTE ON CHRONIC DIASTOLIC CONGESTIVE HEART FAILURE (HCC): Primary | ICD-10-CM

## 2019-01-01 DIAGNOSIS — I10 ESSENTIAL HYPERTENSION: ICD-10-CM

## 2019-01-01 DIAGNOSIS — Z95.0 CARDIAC PACEMAKER IN SITU: Primary | ICD-10-CM

## 2019-01-01 DIAGNOSIS — Z95.0 S/P CARDIAC PACEMAKER PROCEDURE: ICD-10-CM

## 2019-01-01 DIAGNOSIS — I35.0 AORTIC STENOSIS, MODERATE: Chronic | ICD-10-CM

## 2019-01-01 DIAGNOSIS — I49.5 SICK SINUS SYNDROME (HCC): ICD-10-CM

## 2019-01-01 DIAGNOSIS — I34.2 NON-RHEUMATIC MITRAL VALVE STENOSIS: ICD-10-CM

## 2019-01-01 DIAGNOSIS — I73.9 PAD (PERIPHERAL ARTERY DISEASE) (HCC): ICD-10-CM

## 2019-01-01 DIAGNOSIS — I49.5 SICK SINUS SYNDROME (HCC): Primary | ICD-10-CM

## 2019-01-01 DIAGNOSIS — I44.2 COMPLETE HEART BLOCK (HCC): ICD-10-CM

## 2019-01-01 RX ORDER — LEVOFLOXACIN 250 MG/1
TABLET ORAL DAILY
COMMUNITY
End: 2019-01-01 | Stop reason: ALTCHOICE

## 2019-01-01 RX ORDER — ASPIRIN 81 MG/1
TABLET ORAL DAILY
COMMUNITY

## 2019-01-01 RX ORDER — CLOPIDOGREL BISULFATE 75 MG/1
75 TABLET ORAL DAILY
COMMUNITY

## 2019-01-01 RX ORDER — NITROGLYCERIN 0.4 MG/1
0.4 TABLET SUBLINGUAL
COMMUNITY

## 2019-03-26 NOTE — PATIENT INSTRUCTIONS
Original appointments New appointments Date/time Wednesday, May 01, 2019 11:00 AM 
Patient Cely Bailey 02/28/1925 (86GR F) #[id number] Y#[id number] Department [facility] Appointment type Established Patient Provider Gato Bethea Date/time Monday, September 30, 2019 01:40 PM 
Patient Cely Bailey 02/28/1925 (66TJ F) #[id number] [id number] Department [facility] Appointment type Established Patient Provider Gato Bethea Continue current rx Daily weights--take extra lasix 40 if weight goes up 3 lbs/1 day or 5 lbs/1 week Continue antibiotics PPM check next month at [address] [facility]  with Dr. Chip Gonzalez 
 
NOTE: The pacemaker check in April 2019 might need to be cancelled. Please discuss with Dr. Wilson Favorite team ([phone])

## 2019-03-26 NOTE — PROGRESS NOTES
Mo Parisi is a 80 y.o. female is here for routine f/u. Resides at [facility]. UTI.  Hx sick sinus, diastolic CHF, s/p PPM, mod AS,mod-severe MS, COPD,  pleural effusions. Stable GUZMÁN, some fatigue, worsening SOB, Dr Eugenio Sinclair at [facility] concerned about CHF vs pneumonia. Mostly URI congestion, scratchy throat. No F,chills. Currently on lasix 40mg every day. Is on Levofloxin currently Using nebs, claritin, mucinex. Last PPM check fall 2018 ok. Intermittent sharp left chest pain, non-exertional.  The patient denies orthopnea, PND,  palpitations, syncope, presyncope or fatigue. Patient Active Problem List  
 Diagnosis Date Noted  Pleural effusion, bilateral 06/04/2017  Respiratory failure ([address] 06/04/2017  Acute diastolic CHF (congestive heart failure) ([address] 06/04/2017  Prediabetes 06/04/2017  Chronic diastolic CHF (congestive heart failure) ([address]  Anemia  S/P cardiac pacemaker procedure 05/11/2017  Acute renal failure ([address] 05/11/2017  Sick sinus syndrome ([address] 05/09/2017  Complete heart block ([address] 05/09/2017  Mitral stenosis 04/21/2017  Pulmonary hypertension ([address] 04/21/2017  Aortic stenosis, moderate 04/21/2017  Iron deficiency anemia 04/20/2017  Osteoporosis 04/20/2017  Diastolic CHF, acute on chronic (HCC) 04/19/2017  Acute bronchitis 04/19/2017  Advance care planning 01/17/2017  Neuropathy 10/11/2016  Gastroesophageal reflux disease 10/11/2016  Osteoarthritis 09/10/2015  PAD (peripheral artery disease) ([address]  Anxiety  Hyperlipidemia  Asthma  Hypertension  Chronic pain Nancy Daily MD 
Past Medical History:  
Diagnosis Date  Acute renal failure ([address] 5/11/2017  Anemia  Anxiety  Asthma  Breast cancer ([address] breast  
 Chronic diastolic CHF (congestive heart failure) ([address]  Chronic pain  Elevated lipids  GERD (gastroesophageal reflux disease)  Heart murmur  Hypercholesterolemia  Hyperlipidemia  Hypertension  Leg cramps  Mitral stenosis 4/21/2017  Osteoporosis  PAD [facility]per[facility] artery disease) [facility]  Pleural effusion, bilateral 6/4/2017  Prediabetes 6/4/2017  
 a1c 6.3 1 month ago  Respiratory failure ([facility] 6/4/2017 Based on ER sats 88% on RA  
 S/P cardiac pacemaker procedure 5/11/2017 5/9/17 [employer] dual chamber pacemaker  Sick sinus syndrome ([facility] Past Surgical History:  
Procedure Laterality Date  BREAST SURGERY PROCEDURE UNLISTED  HX CATARACT REMOVAL    
 HX COLONOSCOPY  06/07/2006  HX PACEMAKER PLACEMENT  2017 Allergies Allergen Reactions  Latex Other (comments)  Doxycycline Unknown (comments) WEAKNESS  Amoxicillin Unknown (comments)  Biaxin [Clarithromycin] Unknown (comments)  Celebrex [Celecoxib] Other (comments) Blood in urine  Indocin [Indomethacin] Unknown (comments)  Lactose Diarrhea  Zithromax [Azithromycin] Unknown (comments) Patient said she is allergic all Mycins Family History Problem Relation Age of Onset  Cancer Brother   
     kidney CA Social History Socioeconomic History  Marital status:  Spouse name: Not on file  Number of children: 1  Years of education: Not on file  Highest education level: Not on file Occupational History  Not on file Social Needs  Financial resource strain: Not on file  Food insecurity:  
  Worry: Not on file Inability: Not on file  Transportation needs:  
  Medical: Not on file Non-medical: Not on file Tobacco Use  Smoking status: Never Smoker  Smokeless tobacco: Never Used Substance and Sexual Activity  Alcohol use: No  
  Alcohol/week: 0.0 oz  Drug use: No  
 Sexual activity: Not Currently Partners: Male Lifestyle  Physical activity:  
  Days per week: Not on file Minutes per session: Not on file  Stress: Not on file Relationships  Social connections:  
  Talks on phone: Not on file Gets together: Not on file Attends [employer] service: Not on file Active member of club or organization: Not on file Attends meetings of clubs or organizations: Not on file Relationship status: Not on file  Intimate partner violence:  
  Fear of current or ex partner: Not on file Emotionally abused: Not on file Physically abused: Not on file Forced sexual activity: Not on file Other Topics Concern  Not on file Social History Narrative Lives alone, her son lives at [place] and visit/help her once in a week,  
  
Current Outpatient Medications Medication Sig  
 urea (CARMOL) 20 % topical cream Apply  to affected area. apply to affected area as directed--Apply to feet at 8am and 2pm every week on Wednesdays  ondansetron (ZOFRAN ODT) 4 mg disintegrating tablet Take 4 mg by mouth every eight (8) hours as needed for Nausea.  sucralfate (CARAFATE) 1 gram tablet Take 1 g by mouth four (4) times daily.  cefTRIAXone (ROCEPHIN) 1 gram injection 1 g by IntraMUSCular route. Daily for 7 days  citalopram (CELEXA) 20 mg tablet Take  by mouth every evening.  beclomethasone (QVAR) 40 mcg/actuation aero Take 2 Puffs by inhalation. 2 puffs at 9am and 8pm for asthma  losartan (COZAAR) 25 mg tablet Take  by mouth daily.  Oxygen 2 liter as needed  docusate sodium (COLACE) 100 mg capsule Take 100 mg by mouth two (2) times a day.  albuterol-ipratropium (DUO-NEB) 2.5 mg-0.5 mg/3 ml nebu 3 mL by Nebulization route every four (4) hours as needed.  metoprolol succinate (TOPROL-XL) 50 mg XL tablet Take 1 Tab by mouth daily. Indications: Chronic Heart Failure, hypertension (Patient taking differently: Take 25 mg by mouth daily. Indications: chronic heart failure, hypertension)  potassium chloride SR (KLOR-CON 10) 10 mEq tablet Take 1 Tab by mouth daily.  HYDROcodone-acetaminophen (NORCO) 5-325 mg per tablet Take 1 Tab by mouth every eight (8) hours as needed. Max Daily Amount: 3 Tabs.  furosemide (LASIX) 20 mg tablet Take 1 Tab by mouth daily. (Patient taking differently: Take 40 mg by mouth daily.)  acetaminophen (TYLENOL) 325 mg tablet Take 2 Tabs by mouth every four (4) hours as needed.  loratadine (CLARITIN) 10 mg tablet Take 1 Tab by mouth daily. (Patient taking differently: Take 10 mg by mouth every fourty-eight (48) hours.)  polyethylene glycol (MIRALAX) 17 gram packet Take 1 Packet by mouth daily as needed.  senna (SENOKOT) 8.6 mg tablet Take 1 Tab by mouth daily.  ferrous sulfate (IRON) 325 mg (65 mg iron) EC tablet Take one twice a day  cilostazol (PLETAL) 100 mg tablet One bid  gabapentin (NEURONTIN) 100 mg capsule TAKE ONE CAPSULE BY MOUTH AT BEDTIME FOR NEUROPATHIC PAIN  
 gemfibrozil (LOPID) 600 mg tablet TAKE 1 TABLET BY MOUTH TWICE DAILY FOR CHOLESTROL  omeprazole (PRILOSEC) 20 mg capsule TAKE ONE CAPSULE BY MOUTH DAILY FOR STOMACH (Patient taking differently: two (2) times a day. TAKE ONE CAPSULE BY MOUTH DAILY FOR STOMACH)  guaiFENesin (MUCUS RELIEF) 400 mg tablet Take 600 mg by mouth two (2) times a day. No current facility-administered medications for this visit. Review of Symptoms: 
 
CONST  No weight change. No fever, chills, sweats ENT No visual changes, URI sx, sore throat CV  See HPI  
RESP  No cough, or sputum, wheezing. Also see HPI  
GI  No abdominal pain or change in bowel habits. No heartburn or dysphagia. No melena or rectal bleeding.   No dysuria, urgency, frequency, hematuria MSKEL  No joint pain, swelling. No muscle pain. SKIN  No rash or lesions. NEURO  No headache, syncope, or seizure. No weakness, loss of sensation, or paresthesias. PSYCH  No low mood or depression No anxiety. HE/LYMPH  No easy bruising, abnormal bleeding, or enlarged glands. Physical ExamPhysical Exam:   
Visit Vitals Resp 20 Ht 5' 2\" (1.575 m) BMI 28.35 kg/m² Gen: NAD HEENT:  PERRL, throat clear Neck: no adenopathy, no thyromegaly, no JVD Heart:  Regular,Nl S1S2,  III/VI murmur, no gallop or rub.  
Lungs:  clear Abdomen:   Soft, non-tender, bowel sounds are active.  
Extremities:  No edema Pulse: symmetric Neuro: A&O times 3, No focal neuro deficits Cardiographics ECG: atrial sensing, V pacing Labs:  
Lab Results Component Value Date/Time  Sodium 137 01/22/2018 10:00 AM  
 Sodium 141 01/20/2018 06:06 AM  
 Sodium 139 01/19/2018 05:30 AM  
 Sodium 143 01/18/2018 08:15 PM  
 Sodium 138 06/07/2017 04:00 AM  
 Potassium 4.0 01/22/2018 10:00 AM  
 Potassium 4.8 01/20/2018 06:06 AM  
 Potassium 3.9 01/19/2018 05:30 AM  
 Potassium 5.1 01/18/2018 08:15 PM  
 Potassium 3.6 06/07/2017 04:00 AM  
 Chloride 102 01/22/2018 10:00 AM  
 Chloride 103 01/20/2018 06:06 AM  
 Chloride 101 01/19/2018 05:30 AM  
 Chloride 106 01/18/2018 08:15 PM  
 Chloride 101 06/07/2017 04:00 AM  
 CO2 24 01/22/2018 10:00 AM  
 CO2 23 01/20/2018 06:06 AM  
 CO2 25 01/19/2018 05:30 AM  
 CO2 25 01/18/2018 08:15 PM  
 CO2 33 (H) 06/07/2017 04:00 AM  
 Anion gap 11 01/22/2018 10:00 AM  
 Anion gap 15 01/20/2018 06:06 AM  
 Anion gap 13 01/19/2018 05:30 AM  
 Anion gap 12 01/18/2018 08:15 PM  
 Anion gap 4 (L) 06/07/2017 04:00 AM  
 Glucose 146 (H) 01/22/2018 10:00 AM  
 Glucose 167 (H) 01/20/2018 06:06 AM  
 Glucose 199 (H) 01/19/2018 05:30 AM  
 Glucose 156 (H) 01/18/2018 08:15 PM  
 Glucose 112 (H) 06/07/2017 04:00 AM  
 BUN 43 (H) 01/22/2018 10:00 AM  
 BUN 30 (H) 01/20/2018 06:06 AM  
 BUN 23 (H) 01/19/2018 05:30 AM  
 BUN 28 (H) 01/18/2018 08:15 PM  
 BUN 13 06/07/2017 04:00 AM  
 Creatinine 1.58 (H) 01/22/2018 10:00 AM  
 Creatinine 1.42 (H) 01/20/2018 06:06 AM  
 Creatinine 1.38 (H) 01/19/2018 05:30 AM  
 Creatinine 1.62 (H) 01/18/2018 08:15 PM  
 Creatinine 0.97 06/07/2017 04:00 AM  
 BUN/Creatinine ratio 27 (H) 01/22/2018 10:00 AM  
 BUN/Creatinine ratio 21 (H) 01/20/2018 06:06 AM  
 BUN/Creatinine ratio 17 01/19/2018 05:30 AM  
 BUN/Creatinine ratio 17 01/18/2018 08:15 PM  
 BUN/Creatinine ratio 13 06/07/2017 04:00 AM  
 GFR est AA 37 (L) 01/22/2018 10:00 AM  
 GFR est AA 42 (L) 01/20/2018 06:06 AM  
 GFR est AA 43 (L) 01/19/2018 05:30 AM  
 GFR est AA 36 (L) 01/18/2018 08:15 PM  
 GFR est AA >60 06/07/2017 04:00 AM  
 GFR est non-AA 31 (L) 01/22/2018 10:00 AM  
 GFR est non-AA 35 (L) 01/20/2018 06:06 AM  
 GFR est non-AA 36 (L) 01/19/2018 05:30 AM  
 GFR est non-AA 30 (L) 01/18/2018 08:15 PM  
 GFR est non-AA 54 (L) 06/07/2017 04:00 AM  
 Calcium 9.0 01/22/2018 10:00 AM  
 Calcium 9.3 01/20/2018 06:06 AM  
 Calcium 9.3 01/19/2018 05:30 AM  
 Calcium 9.8 01/18/2018 08:15 PM  
 Calcium 8.6 06/07/2017 04:00 AM  
 Bilirubin, total 0.3 01/18/2018 08:15 PM  
 Bilirubin, total 0.6 06/04/2017 03:15 PM  
 Bilirubin, total 0.4 05/11/2017 09:10 AM  
 Bilirubin, total 0.6 05/10/2017 04:14 AM  
 Bilirubin, total 0.5 05/09/2017 03:29 PM  
 AST (SGOT) 10 (L) 01/18/2018 08:15 PM  
 AST (SGOT) 15 06/04/2017 03:15 PM  
 AST (SGOT) 12 (L) 05/11/2017 09:10 AM  
 AST (SGOT) 11 (L) 05/10/2017 04:14 AM  
 AST (SGOT) 6 (L) 05/09/2017 03:29 PM  
 Alk. phosphatase 84 01/18/2018 08:15 PM  
 Alk. phosphatase 66 06/04/2017 03:15 PM  
 Alk. phosphatase 75 05/11/2017 09:10 AM  
 Alk. phosphatase 73 05/10/2017 04:14 AM  
 Alk.  phosphatase 71 05/09/2017 03:29 PM  
 Protein, total 6.7 01/18/2018 08:15 PM  
 Protein, total 6.3 (L) 06/04/2017 03:15 PM  
 Protein, total 5.8 (L) 05/11/2017 09:10 AM  
 Protein, total 6.0 (L) 05/10/2017 04:14 AM  
 Protein, total 5.9 (L) 05/09/2017 03:29 PM  
 Albumin 3.2 (L) 01/18/2018 08:15 PM  
 Albumin 3.1 (L) 06/04/2017 03:15 PM  
 Albumin 2.6 (L) 05/11/2017 09:10 AM  
 Albumin 2.7 (L) 05/10/2017 04:14 AM  
 Albumin 2.8 (L) 05/09/2017 03:29 PM  
 Globulin 3.5 01/18/2018 08:15 PM  
 Globulin 3.2 06/04/2017 03:15 PM  
 Globulin 3.2 05/11/2017 09:10 AM  
 Globulin 3.3 05/10/2017 04:14 AM  
 Globulin 3.1 05/09/2017 03:29 PM  
 A-G Ratio 0.9 (L) 01/18/2018 08:15 PM  
 A-G Ratio 1.0 (L) 06/04/2017 03:15 PM  
 A-G Ratio 0.8 (L) 05/11/2017 09:10 AM  
 A-G Ratio 0.8 (L) 05/10/2017 04:14 AM  
 A-G Ratio 0.9 (L) 05/09/2017 03:29 PM  
 ALT (SGPT) 8 (L) 01/18/2018 08:15 PM  
 ALT (SGPT) 14 06/04/2017 03:15 PM  
 ALT (SGPT) 11 (L) 05/11/2017 09:10 AM  
 ALT (SGPT) 9 (L) 05/10/2017 04:14 AM  
 ALT (SGPT) 10 (L) 05/09/2017 03:29 PM  
 
Lab Results Component Value Date/Time CK 54 06/04/2017 03:15 PM  
 
Lab Results Component Value Date/Time Cholesterol, total 153 04/14/2017 08:41 AM  
 Cholesterol, total 169 07/21/2016 08:54 AM  
 Cholesterol, total 182 02/18/2016 10:21 AM  
 Cholesterol, total 182 09/10/2015 09:54 AM  
 Cholesterol, total 165 03/12/2015 10:22 AM  
 HDL Cholesterol 77 04/14/2017 08:41 AM  
 HDL Cholesterol 74 07/21/2016 08:54 AM  
 HDL Cholesterol 84 02/18/2016 10:21 AM  
 HDL Cholesterol 81 09/10/2015 09:54 AM  
 HDL Cholesterol 64 03/12/2015 10:22 AM  
 LDL, calculated 59 04/14/2017 08:41 AM  
 LDL, calculated 80 07/21/2016 08:54 AM  
 LDL, calculated 83 02/18/2016 10:21 AM  
 LDL, calculated 86 09/10/2015 09:54 AM  
 LDL, calculated 76 03/12/2015 10:22 AM  
 Triglyceride 87 04/14/2017 08:41 AM  
 Triglyceride 75 07/21/2016 08:54 AM  
 Triglyceride 77 02/18/2016 10:21 AM  
 Triglyceride 76 09/10/2015 09:54 AM  
 Triglyceride 126 03/12/2015 10:22 AM  
 
No results found for this or any previous visit. Assessment:  
  
  
Patient Active Problem List  
 Diagnosis Date Noted  Pleural effusion, bilateral 06/04/2017  Respiratory failure ([facility] 06/04/2017  Acute diastolic CHF (congestive heart failure) ([facility] 06/04/2017  Prediabetes 06/04/2017  Chronic diastolic CHF (congestive heart failure) ([facility]  Anemia  S/P cardiac pacemaker procedure 05/11/2017  Acute renal failure ([facility] 05/11/2017  Sick sinus syndrome ([facility] 05/09/2017  Complete heart block ([facility] 05/09/2017  Mitral stenosis 04/21/2017  Pulmonary hypertension ([facility] 04/21/2017  Aortic stenosis, moderate 04/21/2017  Iron deficiency anemia 04/20/2017  Osteoporosis 04/20/2017  Diastolic CHF, acute on chronic (HCC) 04/19/2017  Acute bronchitis 04/19/2017  Advance care planning 01/17/2017  Neuropathy 10/11/2016  Gastroesophageal reflux disease 10/11/2016  Osteoarthritis 09/10/2015  PAD (peripheral artery disease) ([facility]  Anxiety  Hyperlipidemia  Asthma  Hypertension  Chronic pain Resides at [facility]. UTI.  Hx sick sinus, diastolic CHF, s/p PPM, mod AS,mod-severe MS, COPD,  pleural effusions. Stable GUZMÁN, some fatigue, worsening SOB, Dr Tiffany Hunter at [facility] concerned about CHF vs pneumonia. Mostly URI congestion, scratchy throat. No F,chills. Currently on lasix 40mg every day. Is on Levofloxin currently. Using nebs, claritin, mucinex. Last PPM check fall 2018 ok. Intermittent sharp left chest pain, non-exertional.  
 
 Plan:  
 
Continue current rx Daily weights--take extra lasix 40 if weight goes up 3 lbs/1 day or 5 lbs/1 week Continue antibiotics PPM check next month at [id number] with Dr. Bj Adams and labs followed by PCP. Continue current care and f/u in 6 months.  
 
Williemae Gottron, MD

## 2019-03-26 NOTE — PROGRESS NOTES
Verified patient with two patient identifiers. Medications reviewed/approved by Dr. Dung James. A verbal from Dr. Dung James was given to remove any medications that were deleted during the visit. Medication(s) removed: none Chief Complaint Patient presents with  CHF [facility] Naheed [facility]  [facility]. Have you been to the ER, urgent care clinic since your last visit? Hospitalized since your last visit? YES, [facility]  Admission for pneumonia and mild chf 
2. Have you seen or consulted any other health care providers outside of the [address] [place] [address] since your last visit? Include any pap smears or colon screening. Yes, Dr. Naheed Sutton at [facility].

## 2019-09-30 NOTE — PROGRESS NOTES
PATIENT ID VERIFIED WITH TWO PATIENT IDENTIFIERS. PATIENT DID NOT BRING A MEDICATION LIST TO OFFICE VISIT. NURSING AID [facility] IS WITH PAT[facility]QUESTED MED LIST BE FAXED. Chief Complaint   Patient presents with    Pacemaker Check     6 MO F/U AND BS PACEMAKER CHECK    CHF    Hypertension    Aortic Stenosis       1. Have you been to the ER, urgent care clinic since your last visit? Hospitalized since your last visit? YES admission 7/5/19 for respiratory failure    2. Have you seen or consulted any other health care providers outside of the [address] [place] [address] since your last visit? Include any pap smears or colon screening.  Yes [place] cardiovascular surgery 8/23/19 and neurology [facility] 7/5/19 for tremor

## 2019-09-30 NOTE — PROGRESS NOTES
Verified patient with two patient identifiers. Medications reviewed/approved by Dr. Slater Essex. A verbal order from Dr. Slater Essex was received with [facility] to remove any medications that were deleted during the visit.    Medication(s) removed:  cilostazol (PLETAL) 100 mg tablet   cefTRIAXone (ROCEPHIN) 1 gram injection   citalopram (CELEXA) 20 mg tablet   guaifenesin (MUCUS RELIEF PO)   levoFLOXacin (LEVAQUIN) 250 mg tablet   ondansetron (ZOFRAN ODT) 4 mg disintegrating tablet   sucralfate (CARAFATE) 1 gram tablet   urea (CARMOL) 20 % topical cream   polyethylene glycol (MIRALAX) 17 gram packet   potassium chloride SR (KLOR-CON 10) 10 mEq tablet

## 2019-09-30 NOTE — PROGRESS NOTES
Tate Ricketts is a 80 y.o. female is here for routine f/u and PPM check. Resides at [facility], [place]. UTI.  Hx sick sinus, diastolic CHF, s/p PPM, mod AS,mod-severe MS, COPD,  pleural effusions.  Stable GUZMÁN, some fatigue . Has bilat knee/leg pain, multiple somatic complaints. The patient denies chest pain, orthopnea, PND, palpitations, syncope, presyncope.        Patient Active Problem List    Diagnosis Date Noted    Pleural effusion, bilateral 06/04/2017    Respiratory failure ([address] 06/04/2017    Acute diastolic CHF (congestive heart failure) ([address] 06/04/2017    Prediabetes 06/04/2017    Chronic diastolic CHF (congestive heart failure) (HCC)     Anemia     S/P cardiac pacemaker procedure 05/11/2017    Acute renal failure ([address] 05/11/2017    Sick sinus syndrome ([address] 05/09/2017    Complete heart block ([address] 05/09/2017    Mitral stenosis 04/21/2017    Pulmonary hypertension ([address] 04/21/2017    Aortic stenosis, moderate 04/21/2017    Iron deficiency anemia 04/20/2017    Osteoporosis 78/16/3424    Diastolic CHF, acute on chronic ([address] 04/19/2017    Acute bronchitis 04/19/2017    Advance care planning 01/17/2017    Neuropathy 10/11/2016    Gastroesophageal reflux disease 10/11/2016    Osteoarthritis 09/10/2015    PAD (peripheral artery disease) (HCC)     Anxiety     Hyperlipidemia     Asthma     Hypertension     Chronic pain       Other, MD Brenda  Past Medical History:   Diagnosis Date    Acute renal failure ([address] 5/11/2017    Anemia     Anxiety     Asthma     Breast cancer (HCC)     breast    Chronic diastolic CHF (congestive heart failure) (HCC)     Chronic pain     Elevated lipids     GERD (gastroesophageal reflux disease)     Heart murmur     Hypercholesterolemia     Hyperlipidemia     Hypertension     Leg cramps     Mitral stenosis 4/21/2017    Osteoporosis     PAD (peripheral artery disease) (HCC)     Pleural effusion, bilateral 6/4/2017    Prediabetes 6/4/2017 a1c 6.3 1 month ago    Respiratory failure ([facility] 6/4/2017    Based on ER sats 88% on RA    S/P cardiac pacemaker procedure 5/11/2017 5/9/17 [employer] dual chamber pacemaker    Sick sinus syndrome [facility])       Past Surgical History:   Procedure Laterality Date    BREAST SURGERY PROCEDURE UNLISTED      HX CATARACT REMOVAL      HX COLONOSCOPY  06/07/2006    HX PACEMAKER PLACEMENT  2017     Allergies   Allergen Reactions    Latex Other (comments)    Doxycycline Unknown (comments)     WEAKNESS    Amoxicillin Unknown (comments)    Biaxin [Clarithromycin] Unknown (comments)    Celebrex [Celecoxib] Other (comments)     Blood in urine    Indocin [Indomethacin] Unknown (comments)    Lactose Diarrhea    Zithromax [Azithromycin] Unknown (comments)     Patient said she is allergic all Mycins      Family History   Problem Relation Age of Onset    Cancer Brother         kidney CA      Social History     Socioeconomic History    Marital status:      Spouse name: Not on file    Number of children: 1    Years of education: Not on file    Highest education level: Not on file   Occupational History    Not on file   Social Needs    Financial resource strain: Not on file    Food insecurity:     Worry: Not on file     Inability: Not on file    Transportation needs:     Medical: Not on file     Non-medical: Not on file   Tobacco Use    Smoking status: Never Smoker    Smokeless tobacco: Never Used   Substance and Sexual Activity    Alcohol use: No     Alcohol/week: 0.0 standard drinks    Drug use: No    Sexual activity: Not Currently     Partners: Male   Lifestyle    Physical activity:     Days per week: Not on file     Minutes per session: Not on file    Stress: Not on file   Relationships    Social connections:     Talks on phone: Not on file     Gets together: Not on file     Attends [employer] service: Not on file     Active member of club or organization: Not on file     Attends meetings of clubs or organizations: Not on file     Relationship status: Not on file    Intimate partner violence:     Fear of current or ex partner: Not on file     Emotionally abused: Not on file     Physically abused: Not on file     Forced sexual activity: Not on file   Other Topics Concern    Not on file   Social History Narrative    Lives alone, her son lives at [place] and visit/help her once in a week,      Current Outpatient Medications   Medication Sig    aspirin delayed-release 81 mg tablet Take  by mouth daily.  levoFLOXacin (LEVAQUIN) 250 mg tablet Take  by mouth daily.  vit C/E/Zn/coppr/lutein/zeaxan (PRESERVISION AREDS-2 PO) Take 2 Caps by mouth daily.  urea (CARMOL) 20 % topical cream Apply  to affected area. apply to affected area as directed--Apply to feet at 8am and 2pm every week on Wednesdays    ondansetron (ZOFRAN ODT) 4 mg disintegrating tablet Take 4 mg by mouth every eight (8) hours as needed for Nausea.  sucralfate (CARAFATE) 1 gram tablet Take 1 g by mouth four (4) times daily.  cefTRIAXone (ROCEPHIN) 1 gram injection 1 g by IntraMUSCular route. Daily for 7 days    citalopram (CELEXA) 20 mg tablet Take  by mouth every evening.  beclomethasone (QVAR) 40 mcg/actuation aero Take 2 Puffs by inhalation. 2 puffs at 9am and 8pm for asthma    losartan (COZAAR) 25 mg tablet Take  by mouth daily.  Oxygen 4 liter as needed    docusate sodium (COLACE) 100 mg capsule Take 100 mg by mouth two (2) times a day.  albuterol-ipratropium (DUO-NEB) 2.5 mg-0.5 mg/3 ml nebu 3 mL by Nebulization route every four (4) hours as needed.  metoprolol succinate (TOPROL-XL) 50 mg XL tablet Take 1 Tab by mouth daily. Indications: Chronic Heart Failure, hypertension (Patient taking differently: Take 25 mg by mouth daily. Indications: chronic heart failure, hypertension)    potassium chloride SR (KLOR-CON 10) 10 mEq tablet Take 1 Tab by mouth daily.  (Patient taking differently: Take 10 mEq by mouth two (2) times a day.)    HYDROcodone-acetaminophen (NORCO) 5-325 mg per tablet Take 1 Tab by mouth every eight (8) hours as needed. Max Daily Amount: 3 Tabs.  furosemide (LASIX) 20 mg tablet Take 1 Tab by mouth daily. (Patient taking differently: Take 40 mg by mouth daily.)    acetaminophen (TYLENOL) 325 mg tablet Take 2 Tabs by mouth every four (4) hours as needed.  loratadine (CLARITIN) 10 mg tablet Take 1 Tab by mouth daily.  polyethylene glycol (MIRALAX) 17 gram packet Take 1 Packet by mouth daily as needed.  senna (SENOKOT) 8.6 mg tablet Take 1 Tab by mouth daily.  ferrous sulfate (IRON) 325 mg (65 mg iron) EC tablet Take one twice a day    cilostazol (PLETAL) 100 mg tablet One bid    gabapentin (NEURONTIN) 100 mg capsule TAKE ONE CAPSULE BY MOUTH AT BEDTIME FOR NEUROPATHIC PAIN    gemfibrozil (LOPID) 600 mg tablet TAKE 1 TABLET BY MOUTH TWICE DAILY FOR CHOLESTROL (Patient taking differently: Take 600 mg by mouth daily. TAKE 1 TABLET BY MOUTH TWICE DAILY FOR CHOLESTROL)    omeprazole (PRILOSEC) 20 mg capsule TAKE ONE CAPSULE BY MOUTH DAILY FOR STOMACH (Patient taking differently: Take 20 mg by mouth daily. TAKE ONE CAPSULE BY MOUTH DAILY FOR STOMACH)    guaifenesin (MUCUS RELIEF PO) Take 600 mg by mouth two (2) times a day. No current facility-administered medications for this visit. Review of Symptoms:    CONST  No weight change. No fever, chills, sweats    ENT No visual changes, URI sx, sore throat    CV  See HPI   RESP  No cough, or sputum, wheezing. Also see HPI   GI  No abdominal pain or change in bowel habits. No heartburn or dysphagia. No melena or rectal bleeding.   No dysuria, urgency, frequency, hematuria   MSKEL  No joint pain, swelling. No muscle pain. SKIN  No rash or lesions. NEURO  No headache, syncope, or seizure. No weakness, loss of sensation, or paresthesias. PSYCH  No low mood or depression  No anxiety.     HE/LYMPH  No easy bruising, abnormal bleeding, or enlarged glands.         Physical ExamPhysical Exam:    Visit Vitals  /80 (BP 1 Location: Right arm, BP Patient Position: Sitting)   Pulse 66   Resp 18   Ht 5' 2\" (1.575 m)   SpO2 92%   BMI 29.63 kg/m²     Gen: NAD  HEENT:  PERRL, throat clear  Neck: no adenopathy, no thyromegaly, no JVD   Heart:  Regular,Nl S1S2,  II/VI murmur, no gallop or rub.   Lungs:  clear  Abdomen:   Soft, non-tender, bowel sounds are active.   Extremities:  mod edema  Pulse: symmetric  Neuro: A&O times 3, No focal neuro deficits    Cardiographics    ECG: V paced    Labs:   Lab Results   Component Value Date/Time    Sodium 137 01/22/2018 10:00 AM    Sodium 141 01/20/2018 06:06 AM    Sodium 139 01/19/2018 05:30 AM    Sodium 143 01/18/2018 08:15 PM    Sodium 138 06/07/2017 04:00 AM    Potassium 4.0 01/22/2018 10:00 AM    Potassium 4.8 01/20/2018 06:06 AM    Potassium 3.9 01/19/2018 05:30 AM    Potassium 5.1 01/18/2018 08:15 PM    Potassium 3.6 06/07/2017 04:00 AM    Chloride 102 01/22/2018 10:00 AM    Chloride 103 01/20/2018 06:06 AM    Chloride 101 01/19/2018 05:30 AM    Chloride 106 01/18/2018 08:15 PM    Chloride 101 06/07/2017 04:00 AM    CO2 24 01/22/2018 10:00 AM    CO2 23 01/20/2018 06:06 AM    CO2 25 01/19/2018 05:30 AM    CO2 25 01/18/2018 08:15 PM    CO2 33 (H) 06/07/2017 04:00 AM    Anion gap 11 01/22/2018 10:00 AM    Anion gap 15 01/20/2018 06:06 AM    Anion gap 13 01/19/2018 05:30 AM    Anion gap 12 01/18/2018 08:15 PM    Anion gap 4 (L) 06/07/2017 04:00 AM    Glucose 146 (H) 01/22/2018 10:00 AM    Glucose 167 (H) 01/20/2018 06:06 AM    Glucose 199 (H) 01/19/2018 05:30 AM    Glucose 156 (H) 01/18/2018 08:15 PM    Glucose 112 (H) 06/07/2017 04:00 AM    BUN 43 (H) 01/22/2018 10:00 AM    BUN 30 (H) 01/20/2018 06:06 AM    BUN 23 (H) 01/19/2018 05:30 AM    BUN 28 (H) 01/18/2018 08:15 PM    BUN 13 06/07/2017 04:00 AM    Creatinine 1.58 (H) 01/22/2018 10:00 AM    Creatinine 1.42 (H) 01/20/2018 06:06 AM Creatinine 1.38 (H) 01/19/2018 05:30 AM    Creatinine 1.62 (H) 01/18/2018 08:15 PM    Creatinine 0.97 06/07/2017 04:00 AM    BUN/Creatinine ratio 27 (H) 01/22/2018 10:00 AM    BUN/Creatinine ratio 21 (H) 01/20/2018 06:06 AM    BUN/Creatinine ratio 17 01/19/2018 05:30 AM    BUN/Creatinine ratio 17 01/18/2018 08:15 PM    BUN/Creatinine ratio 13 06/07/2017 04:00 AM    GFR est AA 37 (L) 01/22/2018 10:00 AM    GFR est AA 42 (L) 01/20/2018 06:06 AM    GFR est AA 43 (L) 01/19/2018 05:30 AM    GFR est AA 36 (L) 01/18/2018 08:15 PM    GFR est AA >60 06/07/2017 04:00 AM    GFR est non-AA 31 (L) 01/22/2018 10:00 AM    GFR est non-AA 35 (L) 01/20/2018 06:06 AM    GFR est non-AA 36 (L) 01/19/2018 05:30 AM    GFR est non-AA 30 (L) 01/18/2018 08:15 PM    GFR est non-AA 54 (L) 06/07/2017 04:00 AM    Calcium 9.0 01/22/2018 10:00 AM    Calcium 9.3 01/20/2018 06:06 AM    Calcium 9.3 01/19/2018 05:30 AM    Calcium 9.8 01/18/2018 08:15 PM    Calcium 8.6 06/07/2017 04:00 AM    Bilirubin, total 0.3 01/18/2018 08:15 PM    Bilirubin, total 0.6 06/04/2017 03:15 PM    Bilirubin, total 0.4 05/11/2017 09:10 AM    Bilirubin, total 0.6 05/10/2017 04:14 AM    Bilirubin, total 0.5 05/09/2017 03:29 PM    AST (SGOT) 10 (L) 01/18/2018 08:15 PM    AST (SGOT) 15 06/04/2017 03:15 PM    AST (SGOT) 12 (L) 05/11/2017 09:10 AM    AST (SGOT) 11 (L) 05/10/2017 04:14 AM    AST (SGOT) 6 (L) 05/09/2017 03:29 PM    Alk. phosphatase 84 01/18/2018 08:15 PM    Alk. phosphatase 66 06/04/2017 03:15 PM    Alk. phosphatase 75 05/11/2017 09:10 AM    Alk. phosphatase 73 05/10/2017 04:14 AM    Alk.  phosphatase 71 05/09/2017 03:29 PM    Protein, total 6.7 01/18/2018 08:15 PM    Protein, total 6.3 (L) 06/04/2017 03:15 PM    Protein, total 5.8 (L) 05/11/2017 09:10 AM    Protein, total 6.0 (L) 05/10/2017 04:14 AM    Protein, total 5.9 (L) 05/09/2017 03:29 PM    Albumin 3.2 (L) 01/18/2018 08:15 PM    Albumin 3.1 (L) 06/04/2017 03:15 PM    Albumin 2.6 (L) 05/11/2017 09:10 AM Albumin 2.7 (L) 05/10/2017 04:14 AM    Albumin 2.8 (L) 05/09/2017 03:29 PM    Globulin 3.5 01/18/2018 08:15 PM    Globulin 3.2 06/04/2017 03:15 PM    Globulin 3.2 05/11/2017 09:10 AM    Globulin 3.3 05/10/2017 04:14 AM    Globulin 3.1 05/09/2017 03:29 PM    A-G Ratio 0.9 (L) 01/18/2018 08:15 PM    A-G Ratio 1.0 (L) 06/04/2017 03:15 PM    A-G Ratio 0.8 (L) 05/11/2017 09:10 AM    A-G Ratio 0.8 (L) 05/10/2017 04:14 AM    A-G Ratio 0.9 (L) 05/09/2017 03:29 PM    ALT (SGPT) 8 (L) 01/18/2018 08:15 PM    ALT (SGPT) 14 06/04/2017 03:15 PM    ALT (SGPT) 11 (L) 05/11/2017 09:10 AM    ALT (SGPT) 9 (L) 05/10/2017 04:14 AM    ALT (SGPT) 10 (L) 05/09/2017 03:29 PM     Lab Results   Component Value Date/Time    CK 54 06/04/2017 03:15 PM     Lab Results   Component Value Date/Time    Cholesterol, total 153 04/14/2017 08:41 AM    Cholesterol, total 169 07/21/2016 08:54 AM    Cholesterol, total 182 02/18/2016 10:21 AM    Cholesterol, total 182 09/10/2015 09:54 AM    Cholesterol, total 165 03/12/2015 10:22 AM    HDL Cholesterol 77 04/14/2017 08:41 AM    HDL Cholesterol 74 07/21/2016 08:54 AM    HDL Cholesterol 84 02/18/2016 10:21 AM    HDL Cholesterol 81 09/10/2015 09:54 AM    HDL Cholesterol 64 03/12/2015 10:22 AM    LDL, calculated 59 04/14/2017 08:41 AM    LDL, calculated 80 07/21/2016 08:54 AM    LDL, calculated 83 02/18/2016 10:21 AM    LDL, calculated 86 09/10/2015 09:54 AM    LDL, calculated 76 03/12/2015 10:22 AM    Triglyceride 87 04/14/2017 08:41 AM    Triglyceride 75 07/21/2016 08:54 AM    Triglyceride 77 02/18/2016 10:21 AM    Triglyceride 76 09/10/2015 09:54 AM    Triglyceride 126 03/12/2015 10:22 AM     No results found for this or any previous visit.     Assessment:         Patient Active Problem List    Diagnosis Date Noted    Pleural effusion, bilateral 06/04/2017    Respiratory failure ([facility] [facility] 06/04/2017    Acute diastolic CHF (congestive heart failure) ([facility] 06/04/2017    Prediabetes 06/04/2017    Chronic diastolic [facility]est[facility]) [facility]     Anemia     S/P cardiac pacemaker procedure 05/11/2017    Acute renal failure ([facility] 05/11/2017    Sick sinus syndrome ([facility] 05/09/2017    [facility] 05/09/2017    Mitral stenosis 04/21/2017    Pulmonary hypertension ([facility] [facility] 04/21/2017    Aortic stenosis, moderate 04/21/2017    Iron deficiency anemia 04/20/2017    Osteoporosis 81/28/0520    Diastolic CHF, acute on chronic ([facility] [facility] 04/19/2017    Acute bronchitis 04/19/2017    Advance care planning 01/17/2017    Neuropathy 10/11/2016    Gastroesophageal reflux disease 10/11/2016    Osteoarthritis 09/10/2015    PAD (peripheral artery disease) ([facility])     Anxiety     Hyperlipidemia     Asthma     Hypertension     Chronic pain      Resides at [facility]. UTI.  Hx sick sinus, diastolic CHF, s/p PPM, mod AS,mod-severe MS, COPD,  pleural effusions.  Stable GUZMÁN, some fatigue . Has bilat knee/leg pain, multiple somatic complaints.      Plan:     PPM check today if office--ok  Increase lasix to 40mg bid (currently on 60mg total/day)  Recheck K, labs and rx accordingly  Continue other meds  RTC 6 mos, sooner prn    Amparo Victor MD
